# Patient Record
Sex: FEMALE | Race: BLACK OR AFRICAN AMERICAN | NOT HISPANIC OR LATINO | Employment: OTHER | RURAL
[De-identification: names, ages, dates, MRNs, and addresses within clinical notes are randomized per-mention and may not be internally consistent; named-entity substitution may affect disease eponyms.]

---

## 2017-07-03 ENCOUNTER — HISTORICAL (OUTPATIENT)
Dept: ADMINISTRATIVE | Facility: HOSPITAL | Age: 42
End: 2017-07-03

## 2017-07-06 LAB
LAB AP CLINICAL INFORMATION: NORMAL
LAB AP GENERAL CAT - HISTORICAL: NORMAL
LAB AP INTERPRETATION/RESULT - HISTORICAL: NEGATIVE
LAB AP SPECIMEN ADEQUACY - HISTORICAL: NORMAL
LAB AP SPECIMEN SUBMITTED - HISTORICAL: NORMAL

## 2020-08-13 ENCOUNTER — HISTORICAL (OUTPATIENT)
Dept: ADMINISTRATIVE | Facility: HOSPITAL | Age: 45
End: 2020-08-13

## 2020-08-13 LAB
ANION GAP SERPL CALCULATED.3IONS-SCNC: 16 MMOL/L
BUN SERPL-MCNC: 14 MG/DL (ref 7–18)
CALCIUM SERPL-MCNC: 9.3 MG/DL (ref 8.5–10.1)
CHLORIDE SERPL-SCNC: 98 MMOL/L (ref 98–107)
CO2 SERPL-SCNC: 22 MMOL/L (ref 21–32)
CREAT SERPL-MCNC: 0.94 MG/DL (ref 0.55–1.02)
GLUCOSE SERPL-MCNC: 260 MG/DL (ref 70–105)
GLUCOSE SERPL-MCNC: 318 MG/DL (ref 70–105)
GLUCOSE SERPL-MCNC: 335 MG/DL (ref 60–95)
GLUCOSE SERPL-MCNC: 342 MG/DL (ref 74–106)
LDH SERPL L TO P-CCNC: 1.5 MMOL/L (ref 0.3–1.2)
POC BASE EXCESS: 2.4 MMOL/L (ref -2–3)
POC HCO3 VENOUS: 27 MMOL/L (ref 24–28)
POC HCT: 49 % (ref 35–51)
POC IONIZED CALCIUM: 1.12 MMOL/L (ref 1.15–1.35)
POC PCO2 VENOUS: 42 MM HG (ref 41–51)
POC PH VENOUS: 7.42 PH UNITS (ref 7.32–7.42)
POC PO2 VENOUS: 38 MM HG (ref 25–40)
POC SATURATED O2 VENOUS: 73 % (ref 40–70)
POTASSIUM SERPL-SCNC: 4.4 MMOL/L (ref 3.5–5.1)
POTASSIUM SERPL-SCNC: 5 MMOL/L (ref 3.4–4.5)
SODIUM SERPL-SCNC: 132 MMOL/L (ref 136–145)
SODIUM SERPL-SCNC: 132 MMOL/L (ref 136–145)

## 2020-09-03 ENCOUNTER — HISTORICAL (OUTPATIENT)
Dept: ADMINISTRATIVE | Facility: HOSPITAL | Age: 45
End: 2020-09-03

## 2020-09-03 LAB
ANION GAP SERPL CALCULATED.3IONS-SCNC: 9 MMOL/L
BASOPHILS # BLD AUTO: 0.01 X10E3/UL (ref 0–0.2)
BASOPHILS NFR BLD AUTO: 0.2 % (ref 0–1)
BUN SERPL-MCNC: 11 MG/DL (ref 7–17)
CALCIUM SERPL-MCNC: 9.4 MG/DL (ref 8.5–10.1)
CHLORIDE SERPL-SCNC: 104 MMOL/L (ref 98–107)
CO2 SERPL-SCNC: 27 MMOL/L (ref 21–32)
CREAT SERPL-MCNC: 1.11 MG/DL (ref 0.55–1.3)
EOSINOPHIL # BLD AUTO: 0.05 X10E3/UL (ref 0–0.5)
EOSINOPHIL NFR BLD AUTO: 0.9 % (ref 1–4)
ERYTHROCYTE [DISTWIDTH] IN BLOOD BY AUTOMATED COUNT: 12.7 % (ref 11.5–14.5)
GLUCOSE SERPL-MCNC: 336 MG/DL (ref 74–106)
HCT VFR BLD AUTO: 37.3 % (ref 38–47)
HGB BLD-MCNC: 12.9 G/DL (ref 12–16)
LYMPHOCYTES # BLD AUTO: 1.8 X10E3/UL (ref 1–4.8)
LYMPHOCYTES NFR BLD AUTO: 32.6 % (ref 27–41)
MCH RBC QN AUTO: 31 PG (ref 27–31)
MCHC RBC AUTO-ENTMCNC: 34.6 G/DL (ref 32–36)
MCV RBC AUTO: 90 FL (ref 80–96)
MONOCYTES # BLD AUTO: 0.43 X10E3/UL (ref 0–0.8)
MONOCYTES NFR BLD AUTO: 7.8 % (ref 2–6)
MPC BLD CALC-MCNC: 11 FL (ref 9.4–12.4)
NEUTROPHILS # BLD AUTO: 3.23 X10E3/UL (ref 1.8–7.7)
NEUTROPHILS NFR BLD AUTO: 58.5 % (ref 53–65)
PLATELET # BLD AUTO: 218 X10E3/UL (ref 150–400)
POTASSIUM SERPL-SCNC: 3.7 MMOL/L (ref 3.5–5.1)
RBC # BLD AUTO: 4.16 X10E6/UL (ref 4.2–5.4)
SODIUM SERPL-SCNC: 136 MMOL/L (ref 136–145)
WBC # BLD AUTO: 5.52 X10E3/UL (ref 4.5–11)

## 2020-09-14 ENCOUNTER — HISTORICAL (OUTPATIENT)
Dept: ADMINISTRATIVE | Facility: HOSPITAL | Age: 45
End: 2020-09-14

## 2020-09-15 LAB
LAB AP CLINICAL INFORMATION: NORMAL
LAB AP GENERAL CAT - HISTORICAL: NORMAL
LAB AP INTERPRETATION/RESULT - HISTORICAL: NEGATIVE
LAB AP SPECIMEN ADEQUACY - HISTORICAL: NORMAL
LAB AP SPECIMEN SUBMITTED - HISTORICAL: NORMAL
RPR SER-TITR: NON REACTIVE {TITER}

## 2020-09-21 ENCOUNTER — HISTORICAL (OUTPATIENT)
Dept: ADMINISTRATIVE | Facility: HOSPITAL | Age: 45
End: 2020-09-21

## 2020-11-12 ENCOUNTER — HISTORICAL (OUTPATIENT)
Dept: ADMINISTRATIVE | Facility: HOSPITAL | Age: 45
End: 2020-11-12

## 2020-11-13 LAB — RPR SER-TITR: NON REACTIVE {TITER}

## 2020-12-01 LAB
LEFT EYE DM RETINOPATHY: NEGATIVE
RIGHT EYE DM RETINOPATHY: NEGATIVE

## 2021-04-14 ENCOUNTER — HISTORICAL (OUTPATIENT)
Dept: ADMINISTRATIVE | Facility: HOSPITAL | Age: 46
End: 2021-04-14

## 2021-04-15 LAB — RPR SER-TITR: NON REACTIVE {TITER}

## 2021-05-10 VITALS
SYSTOLIC BLOOD PRESSURE: 105 MMHG | WEIGHT: 215 LBS | HEIGHT: 65 IN | HEART RATE: 69 BPM | RESPIRATION RATE: 13 BRPM | TEMPERATURE: 97 F | BODY MASS INDEX: 35.82 KG/M2 | DIASTOLIC BLOOD PRESSURE: 74 MMHG

## 2021-05-10 RX ORDER — TOPIRAMATE 25 MG/1
25 TABLET ORAL 2 TIMES DAILY
COMMUNITY
End: 2021-05-17 | Stop reason: DRUGHIGH

## 2021-05-10 RX ORDER — METFORMIN HYDROCHLORIDE 500 MG/1
500 TABLET ORAL 2 TIMES DAILY WITH MEALS
COMMUNITY
End: 2021-09-21 | Stop reason: SDUPTHER

## 2021-05-10 RX ORDER — LINAGLIPTIN 5 MG/1
5 TABLET, FILM COATED ORAL DAILY
COMMUNITY
End: 2021-09-21 | Stop reason: SDUPTHER

## 2021-05-10 RX ORDER — LANCETS 32 GAUGE
100 EACH MISCELLANEOUS
COMMUNITY

## 2021-05-10 RX ORDER — PYRIDOSTIGMINE BROMIDE 60 MG/1
60 TABLET ORAL 3 TIMES DAILY
COMMUNITY
End: 2024-01-24 | Stop reason: ALTCHOICE

## 2021-05-10 RX ORDER — OMEPRAZOLE 20 MG/1
20 CAPSULE, DELAYED RELEASE ORAL DAILY
COMMUNITY
End: 2021-09-21 | Stop reason: SDUPTHER

## 2021-05-10 RX ORDER — ALBUTEROL SULFATE 90 UG/1
2 AEROSOL, METERED RESPIRATORY (INHALATION) EVERY 6 HOURS PRN
COMMUNITY
End: 2022-04-13 | Stop reason: SDUPTHER

## 2021-05-17 ENCOUNTER — OFFICE VISIT (OUTPATIENT)
Dept: FAMILY MEDICINE | Facility: CLINIC | Age: 46
End: 2021-05-17
Payer: MEDICARE

## 2021-05-17 VITALS
RESPIRATION RATE: 12 BRPM | HEIGHT: 65 IN | TEMPERATURE: 97 F | HEART RATE: 77 BPM | DIASTOLIC BLOOD PRESSURE: 76 MMHG | BODY MASS INDEX: 35.65 KG/M2 | SYSTOLIC BLOOD PRESSURE: 107 MMHG | WEIGHT: 214 LBS

## 2021-05-17 DIAGNOSIS — G43.009 MIGRAINE WITHOUT AURA AND WITHOUT STATUS MIGRAINOSUS, NOT INTRACTABLE: ICD-10-CM

## 2021-05-17 DIAGNOSIS — E11.9 TYPE 2 DIABETES MELLITUS WITHOUT COMPLICATION, WITHOUT LONG-TERM CURRENT USE OF INSULIN: Primary | ICD-10-CM

## 2021-05-17 PROCEDURE — 99213 PR OFFICE/OUTPT VISIT, EST, LEVL III, 20-29 MIN: ICD-10-PCS | Mod: ,,, | Performed by: NURSE PRACTITIONER

## 2021-05-17 PROCEDURE — 99213 OFFICE O/P EST LOW 20 MIN: CPT | Mod: ,,, | Performed by: NURSE PRACTITIONER

## 2021-05-17 RX ORDER — TOPIRAMATE 50 MG/1
50 TABLET, FILM COATED ORAL 2 TIMES DAILY
Qty: 60 TABLET | Refills: 0 | Status: SHIPPED | OUTPATIENT
Start: 2021-05-17 | End: 2021-06-21 | Stop reason: SDUPTHER

## 2021-06-10 ENCOUNTER — TELEPHONE (OUTPATIENT)
Dept: FAMILY MEDICINE | Facility: CLINIC | Age: 46
End: 2021-06-10

## 2021-06-10 ENCOUNTER — HOSPITAL ENCOUNTER (OUTPATIENT)
Dept: RADIOLOGY | Facility: HOSPITAL | Age: 46
Discharge: HOME | End: 2021-06-10
Attending: FAMILY MEDICINE
Payer: MEDICARE

## 2021-06-10 VITALS — BODY MASS INDEX: 33.59 KG/M2 | HEIGHT: 66 IN | WEIGHT: 209 LBS

## 2021-06-10 DIAGNOSIS — Z12.31 OTHER SCREENING MAMMOGRAM: ICD-10-CM

## 2021-06-10 PROCEDURE — 77067 SCR MAMMO BI INCL CAD: CPT | Mod: TC

## 2021-06-11 ENCOUNTER — TELEPHONE (OUTPATIENT)
Dept: FAMILY MEDICINE | Facility: CLINIC | Age: 46
End: 2021-06-11

## 2021-06-21 ENCOUNTER — OFFICE VISIT (OUTPATIENT)
Dept: FAMILY MEDICINE | Facility: CLINIC | Age: 46
End: 2021-06-21
Payer: MEDICARE

## 2021-06-21 VITALS
SYSTOLIC BLOOD PRESSURE: 118 MMHG | HEIGHT: 65 IN | WEIGHT: 213 LBS | DIASTOLIC BLOOD PRESSURE: 81 MMHG | RESPIRATION RATE: 14 BRPM | HEART RATE: 92 BPM | BODY MASS INDEX: 35.49 KG/M2 | TEMPERATURE: 98 F

## 2021-06-21 DIAGNOSIS — Z13.220 SCREENING FOR LIPOID DISORDERS: ICD-10-CM

## 2021-06-21 DIAGNOSIS — G43.009 MIGRAINE WITHOUT AURA AND WITHOUT STATUS MIGRAINOSUS, NOT INTRACTABLE: ICD-10-CM

## 2021-06-21 DIAGNOSIS — E11.9 TYPE 2 DIABETES MELLITUS WITHOUT COMPLICATION, WITHOUT LONG-TERM CURRENT USE OF INSULIN: Primary | ICD-10-CM

## 2021-06-21 PROBLEM — M62.81 PROXIMAL MUSCLE WEAKNESS: Status: RESOLVED | Noted: 2018-04-06 | Resolved: 2021-06-21

## 2021-06-21 PROBLEM — M62.81 PROXIMAL MUSCLE WEAKNESS: Status: ACTIVE | Noted: 2018-04-06

## 2021-06-21 PROBLEM — A52.8 LATE SYPHILIS, LATENT: Status: ACTIVE | Noted: 2021-06-21

## 2021-06-21 PROBLEM — H02.409 PTOSIS: Status: ACTIVE | Noted: 2021-06-21

## 2021-06-21 PROBLEM — E55.9 VITAMIN D DEFICIENCY: Status: ACTIVE | Noted: 2018-07-09

## 2021-06-21 PROCEDURE — 99214 OFFICE O/P EST MOD 30 MIN: CPT | Mod: ,,, | Performed by: NURSE PRACTITIONER

## 2021-06-21 PROCEDURE — 99214 PR OFFICE/OUTPT VISIT, EST, LEVL IV, 30-39 MIN: ICD-10-PCS | Mod: ,,, | Performed by: NURSE PRACTITIONER

## 2021-06-21 RX ORDER — TOPIRAMATE 50 MG/1
50 TABLET, FILM COATED ORAL 2 TIMES DAILY
Qty: 60 TABLET | Refills: 2 | Status: SHIPPED | OUTPATIENT
Start: 2021-06-21 | End: 2021-09-21 | Stop reason: SDUPTHER

## 2021-06-21 RX ORDER — SEMAGLUTIDE 1.34 MG/ML
1 INJECTION, SOLUTION SUBCUTANEOUS
Qty: 2 PEN | Refills: 2 | Status: SHIPPED | OUTPATIENT
Start: 2021-06-21 | End: 2021-07-21

## 2021-06-21 RX ORDER — PREDNISONE 20 MG/1
20 TABLET ORAL 2 TIMES DAILY
COMMUNITY
Start: 2021-02-25 | End: 2023-02-27 | Stop reason: ALTCHOICE

## 2021-08-13 ENCOUNTER — OFFICE VISIT (OUTPATIENT)
Dept: NEUROLOGY | Facility: CLINIC | Age: 46
End: 2021-08-13
Payer: MEDICARE

## 2021-08-13 VITALS
SYSTOLIC BLOOD PRESSURE: 114 MMHG | WEIGHT: 203 LBS | HEIGHT: 65 IN | HEART RATE: 81 BPM | BODY MASS INDEX: 33.82 KG/M2 | DIASTOLIC BLOOD PRESSURE: 76 MMHG | OXYGEN SATURATION: 97 %

## 2021-08-13 DIAGNOSIS — H02.401 PTOSIS OF EYELID, RIGHT: ICD-10-CM

## 2021-08-13 DIAGNOSIS — R53.1 WEAKNESS OF RIGHT SIDE OF BODY: Primary | ICD-10-CM

## 2021-08-13 DIAGNOSIS — E03.9 HYPOTHYROIDISM, UNSPECIFIED TYPE: ICD-10-CM

## 2021-08-13 DIAGNOSIS — G73.7 MYOPATHY IN DISEASES CLASSIFIED ELSEWHERE: ICD-10-CM

## 2021-08-13 DIAGNOSIS — E53.8 VITAMIN B12 DEFICIENCY: ICD-10-CM

## 2021-08-13 DIAGNOSIS — E11.69 TYPE 2 DIABETES MELLITUS WITH OTHER SPECIFIED COMPLICATION, WITHOUT LONG-TERM CURRENT USE OF INSULIN: ICD-10-CM

## 2021-08-13 DIAGNOSIS — E55.9 VITAMIN D DEFICIENCY: ICD-10-CM

## 2021-08-13 DIAGNOSIS — M54.12 RADICULOPATHY, CERVICAL REGION: ICD-10-CM

## 2021-08-13 DIAGNOSIS — R27.0 ATAXIA, UNSPECIFIED: ICD-10-CM

## 2021-08-13 PROCEDURE — 99204 PR OFFICE/OUTPT VISIT, NEW, LEVL IV, 45-59 MIN: ICD-10-PCS | Mod: S$PBB,,, | Performed by: NURSE PRACTITIONER

## 2021-08-13 PROCEDURE — 99204 OFFICE O/P NEW MOD 45 MIN: CPT | Mod: S$PBB,,, | Performed by: NURSE PRACTITIONER

## 2021-08-13 PROCEDURE — 99215 OFFICE O/P EST HI 40 MIN: CPT | Mod: PBBFAC | Performed by: NURSE PRACTITIONER

## 2021-08-13 RX ORDER — SEMAGLUTIDE 1.34 MG/ML
INJECTION, SOLUTION SUBCUTANEOUS
COMMUNITY
Start: 2021-07-19 | End: 2021-09-21 | Stop reason: SDUPTHER

## 2021-08-20 ENCOUNTER — HOSPITAL ENCOUNTER (OUTPATIENT)
Dept: RADIOLOGY | Facility: HOSPITAL | Age: 46
Discharge: HOME | End: 2021-08-20
Attending: NURSE PRACTITIONER
Payer: MEDICARE

## 2021-08-20 DIAGNOSIS — M54.12 RADICULOPATHY, CERVICAL REGION: ICD-10-CM

## 2021-08-20 DIAGNOSIS — R27.0 ATAXIA, UNSPECIFIED: ICD-10-CM

## 2021-08-20 PROCEDURE — 70553 MRI BRAIN W WO CONTRAST: ICD-10-PCS | Mod: 26,,, | Performed by: STUDENT IN AN ORGANIZED HEALTH CARE EDUCATION/TRAINING PROGRAM

## 2021-08-20 PROCEDURE — 72156 MRI CERVICAL SPINE W WO CONTRAST: ICD-10-PCS | Mod: 26,,, | Performed by: STUDENT IN AN ORGANIZED HEALTH CARE EDUCATION/TRAINING PROGRAM

## 2021-08-20 PROCEDURE — 70553 MRI BRAIN STEM W/O & W/DYE: CPT | Mod: 26,,, | Performed by: STUDENT IN AN ORGANIZED HEALTH CARE EDUCATION/TRAINING PROGRAM

## 2021-08-20 PROCEDURE — 72156 MRI NECK SPINE W/O & W/DYE: CPT | Mod: TC

## 2021-08-20 PROCEDURE — 25500020 PHARM REV CODE 255: Performed by: NURSE PRACTITIONER

## 2021-08-20 PROCEDURE — A9575 INJ GADOTERATE MEGLUMI 0.1ML: HCPCS | Performed by: NURSE PRACTITIONER

## 2021-08-20 PROCEDURE — 70553 MRI BRAIN STEM W/O & W/DYE: CPT | Mod: TC

## 2021-08-20 PROCEDURE — 72156 MRI NECK SPINE W/O & W/DYE: CPT | Mod: 26,,, | Performed by: STUDENT IN AN ORGANIZED HEALTH CARE EDUCATION/TRAINING PROGRAM

## 2021-08-20 RX ORDER — GADOTERATE MEGLUMINE 376.9 MG/ML
18 INJECTION INTRAVENOUS
Status: COMPLETED | OUTPATIENT
Start: 2021-08-20 | End: 2021-08-20

## 2021-08-20 RX ADMIN — GADOTERATE MEGLUMINE 18 ML: 376.9 INJECTION INTRAVENOUS at 11:08

## 2021-08-23 ENCOUNTER — TELEPHONE (OUTPATIENT)
Dept: NEUROLOGY | Facility: CLINIC | Age: 46
End: 2021-08-23

## 2021-09-21 ENCOUNTER — OFFICE VISIT (OUTPATIENT)
Dept: FAMILY MEDICINE | Facility: CLINIC | Age: 46
End: 2021-09-21
Payer: MEDICARE

## 2021-09-21 VITALS
HEART RATE: 100 BPM | SYSTOLIC BLOOD PRESSURE: 113 MMHG | HEIGHT: 65 IN | WEIGHT: 198 LBS | DIASTOLIC BLOOD PRESSURE: 79 MMHG | OXYGEN SATURATION: 99 % | BODY MASS INDEX: 32.99 KG/M2

## 2021-09-21 DIAGNOSIS — E11.9 TYPE 2 DIABETES MELLITUS WITHOUT COMPLICATION, WITHOUT LONG-TERM CURRENT USE OF INSULIN: Primary | ICD-10-CM

## 2021-09-21 DIAGNOSIS — H02.401 PTOSIS OF EYELID, RIGHT: ICD-10-CM

## 2021-09-21 DIAGNOSIS — G72.9 MYOPATHY: ICD-10-CM

## 2021-09-21 DIAGNOSIS — G43.009 MIGRAINE WITHOUT AURA AND WITHOUT STATUS MIGRAINOSUS, NOT INTRACTABLE: ICD-10-CM

## 2021-09-21 LAB — GLUCOSE SERPL-MCNC: 86 MG/DL (ref 70–110)

## 2021-09-21 PROCEDURE — 82962 GLUCOSE BLOOD TEST: CPT | Mod: RHCUB | Performed by: NURSE PRACTITIONER

## 2021-09-21 PROCEDURE — 99213 OFFICE O/P EST LOW 20 MIN: CPT | Mod: ,,, | Performed by: NURSE PRACTITIONER

## 2021-09-21 PROCEDURE — 99213 PR OFFICE/OUTPT VISIT, EST, LEVL III, 20-29 MIN: ICD-10-PCS | Mod: ,,, | Performed by: NURSE PRACTITIONER

## 2021-09-21 RX ORDER — METFORMIN HYDROCHLORIDE 500 MG/1
500 TABLET ORAL 2 TIMES DAILY WITH MEALS
Qty: 180 TABLET | Refills: 0 | Status: SHIPPED | OUTPATIENT
Start: 2021-09-21 | End: 2021-12-28 | Stop reason: SDUPTHER

## 2021-09-21 RX ORDER — TOPIRAMATE 50 MG/1
50 TABLET, FILM COATED ORAL 2 TIMES DAILY
Qty: 180 TABLET | Refills: 0 | Status: SHIPPED | OUTPATIENT
Start: 2021-09-21 | End: 2021-12-28 | Stop reason: SDUPTHER

## 2021-09-21 RX ORDER — OMEPRAZOLE 20 MG/1
20 CAPSULE, DELAYED RELEASE ORAL DAILY
Qty: 90 CAPSULE | Refills: 0 | Status: SHIPPED | OUTPATIENT
Start: 2021-09-21 | End: 2021-12-28 | Stop reason: SDUPTHER

## 2021-09-21 RX ORDER — SEMAGLUTIDE 1.34 MG/ML
INJECTION, SOLUTION SUBCUTANEOUS
Qty: 3 PEN | Refills: 0 | Status: SHIPPED | OUTPATIENT
Start: 2021-09-21 | End: 2021-11-01 | Stop reason: SDUPTHER

## 2021-09-21 RX ORDER — LINAGLIPTIN 5 MG/1
5 TABLET, FILM COATED ORAL DAILY
Qty: 90 TABLET | Refills: 0 | Status: SHIPPED | OUTPATIENT
Start: 2021-09-21 | End: 2021-12-28 | Stop reason: SDUPTHER

## 2021-10-13 ENCOUNTER — OFFICE VISIT (OUTPATIENT)
Dept: OBSTETRICS AND GYNECOLOGY | Facility: CLINIC | Age: 46
End: 2021-10-13
Payer: MEDICARE

## 2021-10-13 VITALS
TEMPERATURE: 99 F | HEART RATE: 74 BPM | BODY MASS INDEX: 32.82 KG/M2 | DIASTOLIC BLOOD PRESSURE: 76 MMHG | RESPIRATION RATE: 18 BRPM | HEIGHT: 65 IN | SYSTOLIC BLOOD PRESSURE: 107 MMHG | WEIGHT: 197 LBS

## 2021-10-13 DIAGNOSIS — Z01.419 ROUTINE GYNECOLOGICAL EXAMINATION: Primary | ICD-10-CM

## 2021-10-13 DIAGNOSIS — N89.8 VAGINAL DISCHARGE: ICD-10-CM

## 2021-10-13 LAB
CANDIDA SPECIES: NEGATIVE
GARDNERELLA: POSITIVE
TRICHOMONAS: NEGATIVE

## 2021-10-13 PROCEDURE — 87660 TRICHOMONAS VAGIN DIR PROBE: CPT | Mod: ,,, | Performed by: CLINICAL MEDICAL LABORATORY

## 2021-10-13 PROCEDURE — 87660 BACTERIAL VAGINOSIS: ICD-10-PCS | Mod: ,,, | Performed by: CLINICAL MEDICAL LABORATORY

## 2021-10-13 PROCEDURE — 87510 BACTERIAL VAGINOSIS: ICD-10-PCS | Mod: ,,, | Performed by: CLINICAL MEDICAL LABORATORY

## 2021-10-13 PROCEDURE — G0008 ADMIN INFLUENZA VIRUS VAC: HCPCS | Mod: ,,, | Performed by: ADVANCED PRACTICE MIDWIFE

## 2021-10-13 PROCEDURE — 90686 IIV4 VACC NO PRSV 0.5 ML IM: CPT | Mod: ,,, | Performed by: ADVANCED PRACTICE MIDWIFE

## 2021-10-13 PROCEDURE — G0008 FLU VACCINE (QUAD) GREATER THAN OR EQUAL TO 3YO PRESERVATIVE FREE IM: ICD-10-PCS | Mod: ,,, | Performed by: ADVANCED PRACTICE MIDWIFE

## 2021-10-13 PROCEDURE — 90686 FLU VACCINE (QUAD) GREATER THAN OR EQUAL TO 3YO PRESERVATIVE FREE IM: ICD-10-PCS | Mod: ,,, | Performed by: ADVANCED PRACTICE MIDWIFE

## 2021-10-13 PROCEDURE — G0101 CA SCREEN;PELVIC/BREAST EXAM: HCPCS | Mod: ,,, | Performed by: ADVANCED PRACTICE MIDWIFE

## 2021-10-13 PROCEDURE — G0101 PR CA SCREEN;PELVIC/BREAST EXAM: ICD-10-PCS | Mod: ,,, | Performed by: ADVANCED PRACTICE MIDWIFE

## 2021-10-13 PROCEDURE — 87510 GARDNER VAG DNA DIR PROBE: CPT | Mod: ,,, | Performed by: CLINICAL MEDICAL LABORATORY

## 2021-10-13 PROCEDURE — 87480 CANDIDA DNA DIR PROBE: CPT | Mod: ,,, | Performed by: CLINICAL MEDICAL LABORATORY

## 2021-10-13 PROCEDURE — 87480 BACTERIAL VAGINOSIS: ICD-10-PCS | Mod: ,,, | Performed by: CLINICAL MEDICAL LABORATORY

## 2021-10-14 DIAGNOSIS — N76.0 BACTERIAL VAGINOSIS: Primary | ICD-10-CM

## 2021-10-14 DIAGNOSIS — B96.89 BACTERIAL VAGINOSIS: Primary | ICD-10-CM

## 2021-10-14 RX ORDER — METRONIDAZOLE 500 MG/1
500 TABLET ORAL 2 TIMES DAILY
Qty: 14 TABLET | Refills: 0 | Status: SHIPPED | OUTPATIENT
Start: 2021-10-14 | End: 2021-10-21

## 2021-10-15 ENCOUNTER — TELEPHONE (OUTPATIENT)
Dept: OBSTETRICS AND GYNECOLOGY | Facility: CLINIC | Age: 46
End: 2021-10-15

## 2021-10-26 ENCOUNTER — OFFICE VISIT (OUTPATIENT)
Dept: NEUROLOGY | Facility: CLINIC | Age: 46
End: 2021-10-26
Payer: MEDICARE

## 2021-10-26 VITALS
HEIGHT: 65 IN | HEART RATE: 87 BPM | SYSTOLIC BLOOD PRESSURE: 108 MMHG | BODY MASS INDEX: 32.82 KG/M2 | DIASTOLIC BLOOD PRESSURE: 72 MMHG | OXYGEN SATURATION: 99 % | RESPIRATION RATE: 18 BRPM | WEIGHT: 197 LBS

## 2021-10-26 DIAGNOSIS — G73.7 MYOPATHY IN DISEASES CLASSIFIED ELSEWHERE: ICD-10-CM

## 2021-10-26 DIAGNOSIS — G72.9 MYOPATHY: ICD-10-CM
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
## 2021-10-26 DIAGNOSIS — H02.401 PTOSIS OF EYELID, RIGHT: ICD-10-CM

## 2021-10-26 DIAGNOSIS — M54.12 RADICULOPATHY, CERVICAL REGION: ICD-10-CM

## 2021-10-26 DIAGNOSIS — R53.1 WEAKNESS OF RIGHT SIDE OF BODY: Primary | ICD-10-CM

## 2021-10-26 PROCEDURE — 99213 PR OFFICE/OUTPT VISIT, EST, LEVL III, 20-29 MIN: ICD-10-PCS | Mod: S$PBB,,, | Performed by: NURSE PRACTITIONER

## 2021-10-26 PROCEDURE — 99215 OFFICE O/P EST HI 40 MIN: CPT | Mod: PBBFAC | Performed by: NURSE PRACTITIONER

## 2021-10-26 PROCEDURE — 99213 OFFICE O/P EST LOW 20 MIN: CPT | Mod: S$PBB,,, | Performed by: NURSE PRACTITIONER

## 2021-10-26 RX ORDER — GABAPENTIN 100 MG/1
100 CAPSULE ORAL 3 TIMES DAILY
Qty: 90 CAPSULE | Refills: 11 | Status: SHIPPED | OUTPATIENT
Start: 2021-10-26 | End: 2022-12-28 | Stop reason: ALTCHOICE

## 2021-10-26 RX ORDER — GABAPENTIN 100 MG/1
100 CAPSULE ORAL 3 TIMES DAILY
Qty: 90 CAPSULE | Refills: 11 | Status: SHIPPED | OUTPATIENT
Start: 2021-10-26 | End: 2021-10-26

## 2021-10-26 RX ORDER — METRONIDAZOLE 500 MG/1
500 TABLET ORAL EVERY 12 HOURS
COMMUNITY
End: 2023-02-27 | Stop reason: ALTCHOICE

## 2021-11-01 DIAGNOSIS — E11.9 TYPE 2 DIABETES MELLITUS WITHOUT COMPLICATION, WITHOUT LONG-TERM CURRENT USE OF INSULIN: Primary | ICD-10-CM

## 2021-11-01 RX ORDER — SEMAGLUTIDE 1.34 MG/ML
INJECTION, SOLUTION SUBCUTANEOUS
Qty: 3 PEN | Refills: 0 | Status: SHIPPED | OUTPATIENT
Start: 2021-11-01 | End: 2021-12-28

## 2021-11-09 ENCOUNTER — CLINICAL SUPPORT (OUTPATIENT)
Dept: REHABILITATION | Facility: HOSPITAL | Age: 46
End: 2021-11-09
Payer: MEDICARE

## 2021-11-09 DIAGNOSIS — R53.1 WEAKNESS OF RIGHT SIDE OF BODY: ICD-10-CM
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
## 2021-11-09 PROCEDURE — 97162 PT EVAL MOD COMPLEX 30 MIN: CPT

## 2021-11-11 ENCOUNTER — CLINICAL SUPPORT (OUTPATIENT)
Dept: REHABILITATION | Facility: HOSPITAL | Age: 46
End: 2021-11-11
Payer: MEDICARE

## 2021-11-11 DIAGNOSIS — R53.1 WEAKNESS OF RIGHT SIDE OF BODY: Primary | ICD-10-CM

## 2021-11-11 PROCEDURE — 97110 THERAPEUTIC EXERCISES: CPT | Mod: CQ

## 2021-11-16 ENCOUNTER — OFFICE VISIT (OUTPATIENT)
Dept: NEUROLOGY | Facility: CLINIC | Age: 46
End: 2021-11-16
Payer: MEDICARE

## 2021-11-16 VITALS
BODY MASS INDEX: 32.82 KG/M2 | WEIGHT: 197 LBS | RESPIRATION RATE: 18 BRPM | SYSTOLIC BLOOD PRESSURE: 128 MMHG | OXYGEN SATURATION: 99 % | HEIGHT: 65 IN | HEART RATE: 82 BPM | DIASTOLIC BLOOD PRESSURE: 82 MMHG

## 2021-11-16 DIAGNOSIS — G73.7 MYOPATHY IN DISEASES CLASSIFIED ELSEWHERE: Primary | ICD-10-CM

## 2021-11-16 DIAGNOSIS — G43.009 MIGRAINE WITHOUT AURA AND WITHOUT STATUS MIGRAINOSUS, NOT INTRACTABLE: ICD-10-CM

## 2021-11-16 DIAGNOSIS — R53.1 WEAKNESS OF RIGHT SIDE OF BODY: ICD-10-CM

## 2021-11-16 DIAGNOSIS — H02.401 PTOSIS OF EYELID, RIGHT: ICD-10-CM

## 2021-11-16 DIAGNOSIS — G72.9 MYOPATHY: ICD-10-CM

## 2021-11-16 PROCEDURE — 99213 OFFICE O/P EST LOW 20 MIN: CPT | Mod: S$PBB,,, | Performed by: NURSE PRACTITIONER

## 2021-11-16 PROCEDURE — 99213 PR OFFICE/OUTPT VISIT, EST, LEVL III, 20-29 MIN: ICD-10-PCS | Mod: S$PBB,,, | Performed by: NURSE PRACTITIONER

## 2021-11-16 PROCEDURE — 99214 OFFICE O/P EST MOD 30 MIN: CPT | Mod: PBBFAC | Performed by: NURSE PRACTITIONER

## 2021-12-28 ENCOUNTER — OFFICE VISIT (OUTPATIENT)
Dept: FAMILY MEDICINE | Facility: CLINIC | Age: 46
End: 2021-12-28
Payer: MEDICARE

## 2021-12-28 VITALS
WEIGHT: 208 LBS | TEMPERATURE: 97 F | DIASTOLIC BLOOD PRESSURE: 74 MMHG | OXYGEN SATURATION: 99 % | BODY MASS INDEX: 34.66 KG/M2 | SYSTOLIC BLOOD PRESSURE: 105 MMHG | HEIGHT: 65 IN | HEART RATE: 73 BPM

## 2021-12-28 DIAGNOSIS — G43.009 MIGRAINE WITHOUT AURA AND WITHOUT STATUS MIGRAINOSUS, NOT INTRACTABLE: ICD-10-CM

## 2021-12-28 DIAGNOSIS — R30.0 DYSURIA: ICD-10-CM

## 2021-12-28 DIAGNOSIS — H02.401 PTOSIS OF EYELID, RIGHT: ICD-10-CM

## 2021-12-28 DIAGNOSIS — E78.5 HYPERLIPIDEMIA, UNSPECIFIED HYPERLIPIDEMIA TYPE: ICD-10-CM

## 2021-12-28 DIAGNOSIS — R53.1 WEAKNESS OF RIGHT SIDE OF BODY: ICD-10-CM

## 2021-12-28 DIAGNOSIS — G73.7 MYOPATHY IN DISEASES CLASSIFIED ELSEWHERE: ICD-10-CM

## 2021-12-28 DIAGNOSIS — K21.9 GASTROESOPHAGEAL REFLUX DISEASE, UNSPECIFIED WHETHER ESOPHAGITIS PRESENT: ICD-10-CM

## 2021-12-28 DIAGNOSIS — E11.9 TYPE 2 DIABETES MELLITUS WITHOUT COMPLICATION, WITHOUT LONG-TERM CURRENT USE OF INSULIN: Primary | ICD-10-CM

## 2021-12-28 LAB
BILIRUB SERPL-MCNC: ABNORMAL MG/DL
BLOOD URINE, POC: ABNORMAL
COLOR, POC UA: YELLOW
EST. AVERAGE GLUCOSE BLD GHB EST-MCNC: 114 MG/DL
GLUCOSE SERPL-MCNC: 116 MG/DL (ref 70–110)
GLUCOSE UR QL STRIP: ABNORMAL
HBA1C MFR BLD HPLC: 6 % (ref 4.5–6.6)
KETONES UR QL STRIP: ABNORMAL
LEUKOCYTE ESTERASE URINE, POC: ABNORMAL
NITRITE, POC UA: ABNORMAL
PH, POC UA: 5
PROTEIN, POC: ABNORMAL
SPECIFIC GRAVITY, POC UA: >1.03
UROBILINOGEN, POC UA: 1

## 2021-12-28 PROCEDURE — 99214 OFFICE O/P EST MOD 30 MIN: CPT | Mod: ,,, | Performed by: NURSE PRACTITIONER

## 2021-12-28 PROCEDURE — 83036 HEMOGLOBIN GLYCOSYLATED A1C: CPT | Mod: ,,, | Performed by: CLINICAL MEDICAL LABORATORY

## 2021-12-28 PROCEDURE — 83036 HEMOGLOBIN A1C: ICD-10-PCS | Mod: ,,, | Performed by: CLINICAL MEDICAL LABORATORY

## 2021-12-28 PROCEDURE — 99214 PR OFFICE/OUTPT VISIT, EST, LEVL IV, 30-39 MIN: ICD-10-PCS | Mod: ,,, | Performed by: NURSE PRACTITIONER

## 2021-12-28 PROCEDURE — 82962 GLUCOSE BLOOD TEST: CPT | Mod: RHCUB | Performed by: NURSE PRACTITIONER

## 2021-12-28 PROCEDURE — 81003 URINALYSIS AUTO W/O SCOPE: CPT | Mod: RHCUB | Performed by: NURSE PRACTITIONER

## 2021-12-28 RX ORDER — METFORMIN HYDROCHLORIDE 500 MG/1
500 TABLET ORAL 2 TIMES DAILY WITH MEALS
Qty: 180 TABLET | Refills: 0 | Status: SHIPPED | OUTPATIENT
Start: 2021-12-28 | End: 2022-03-14 | Stop reason: SDUPTHER

## 2021-12-28 RX ORDER — OMEPRAZOLE 20 MG/1
20 CAPSULE, DELAYED RELEASE ORAL DAILY
Qty: 90 CAPSULE | Refills: 0 | Status: SHIPPED | OUTPATIENT
Start: 2021-12-28 | End: 2022-04-13 | Stop reason: SDUPTHER

## 2021-12-28 RX ORDER — LOVASTATIN 20 MG/1
20 TABLET ORAL NIGHTLY
Qty: 90 TABLET | Refills: 0 | Status: SHIPPED | OUTPATIENT
Start: 2021-12-28 | End: 2022-05-03

## 2021-12-28 RX ORDER — SEMAGLUTIDE 1.34 MG/ML
0.5 INJECTION, SOLUTION SUBCUTANEOUS
Qty: 3 PEN | Refills: 0 | Status: SHIPPED | OUTPATIENT
Start: 2021-12-28 | End: 2022-03-28

## 2021-12-28 RX ORDER — TOPIRAMATE 50 MG/1
50 TABLET, FILM COATED ORAL 2 TIMES DAILY
Qty: 180 TABLET | Refills: 0 | Status: SHIPPED | OUTPATIENT
Start: 2021-12-28 | End: 2022-04-13 | Stop reason: SDUPTHER

## 2021-12-28 RX ORDER — LINAGLIPTIN 5 MG/1
5 TABLET, FILM COATED ORAL DAILY
Qty: 90 TABLET | Refills: 0 | Status: SHIPPED | OUTPATIENT
Start: 2021-12-28 | End: 2022-04-13 | Stop reason: SDUPTHER

## 2021-12-29 ENCOUNTER — TELEPHONE (OUTPATIENT)
Dept: FAMILY MEDICINE | Facility: CLINIC | Age: 46
End: 2021-12-29
Payer: MEDICAID

## 2021-12-29 ENCOUNTER — DOCUMENTATION ONLY (OUTPATIENT)
Dept: REHABILITATION | Facility: HOSPITAL | Age: 46
End: 2021-12-29
Payer: MEDICAID

## 2022-03-11 DIAGNOSIS — Z71.89 COMPLEX CARE COORDINATION: ICD-10-CM

## 2022-03-14 DIAGNOSIS — E11.9 TYPE 2 DIABETES MELLITUS WITHOUT COMPLICATION, WITHOUT LONG-TERM CURRENT USE OF INSULIN: ICD-10-CM

## 2022-03-14 NOTE — TELEPHONE ENCOUNTER
----- Message from Fernando Whipple sent at 3/14/2022  4:21 PM CDT -----  Rx refill on metformin to the pharmacy

## 2022-03-15 RX ORDER — METFORMIN HYDROCHLORIDE 500 MG/1
500 TABLET ORAL 2 TIMES DAILY WITH MEALS
Qty: 60 TABLET | Refills: 0 | Status: SHIPPED | OUTPATIENT
Start: 2022-03-15 | End: 2022-04-13 | Stop reason: SDUPTHER

## 2022-04-13 ENCOUNTER — OFFICE VISIT (OUTPATIENT)
Dept: FAMILY MEDICINE | Facility: CLINIC | Age: 47
End: 2022-04-13
Payer: COMMERCIAL

## 2022-04-13 VITALS
OXYGEN SATURATION: 99 % | HEART RATE: 84 BPM | BODY MASS INDEX: 35.82 KG/M2 | SYSTOLIC BLOOD PRESSURE: 110 MMHG | WEIGHT: 215 LBS | DIASTOLIC BLOOD PRESSURE: 73 MMHG | TEMPERATURE: 97 F | HEIGHT: 65 IN

## 2022-04-13 DIAGNOSIS — G89.29 CHRONIC RIGHT SHOULDER PAIN: ICD-10-CM

## 2022-04-13 DIAGNOSIS — J45.909 ASTHMA, UNSPECIFIED ASTHMA SEVERITY, UNSPECIFIED WHETHER COMPLICATED, UNSPECIFIED WHETHER PERSISTENT: ICD-10-CM

## 2022-04-13 DIAGNOSIS — G43.009 MIGRAINE WITHOUT AURA AND WITHOUT STATUS MIGRAINOSUS, NOT INTRACTABLE: ICD-10-CM

## 2022-04-13 DIAGNOSIS — Z11.59 ENCOUNTER FOR HEPATITIS C SCREENING TEST FOR LOW RISK PATIENT: ICD-10-CM

## 2022-04-13 DIAGNOSIS — M25.511 CHRONIC RIGHT SHOULDER PAIN: ICD-10-CM

## 2022-04-13 DIAGNOSIS — E11.9 TYPE 2 DIABETES MELLITUS WITHOUT COMPLICATION, WITHOUT LONG-TERM CURRENT USE OF INSULIN: Primary | ICD-10-CM

## 2022-04-13 DIAGNOSIS — K21.9 GASTROESOPHAGEAL REFLUX DISEASE, UNSPECIFIED WHETHER ESOPHAGITIS PRESENT: ICD-10-CM

## 2022-04-13 DIAGNOSIS — G73.7 MYOPATHY IN DISEASES CLASSIFIED ELSEWHERE: ICD-10-CM

## 2022-04-13 DIAGNOSIS — D22.9 SKIN MOLE: ICD-10-CM

## 2022-04-13 DIAGNOSIS — E78.5 HYPERLIPIDEMIA, UNSPECIFIED HYPERLIPIDEMIA TYPE: ICD-10-CM

## 2022-04-13 DIAGNOSIS — I10 HYPERTENSION, UNSPECIFIED TYPE: ICD-10-CM

## 2022-04-13 LAB
ALBUMIN SERPL BCP-MCNC: 3.6 G/DL (ref 3.5–5)
ALBUMIN/GLOB SERPL: 0.9 {RATIO}
ALP SERPL-CCNC: 83 U/L (ref 39–100)
ALT SERPL W P-5'-P-CCNC: 24 U/L (ref 13–56)
ANION GAP SERPL CALCULATED.3IONS-SCNC: 10 MMOL/L (ref 7–16)
AST SERPL W P-5'-P-CCNC: 13 U/L (ref 15–37)
BASOPHILS # BLD AUTO: 0.02 K/UL (ref 0–0.2)
BASOPHILS NFR BLD AUTO: 0.4 % (ref 0–1)
BILIRUB SERPL-MCNC: 0.4 MG/DL (ref 0–1.2)
BUN SERPL-MCNC: 10 MG/DL (ref 7–18)
BUN/CREAT SERPL: 10 (ref 6–20)
CALCIUM SERPL-MCNC: 9.4 MG/DL (ref 8.5–10.1)
CHLORIDE SERPL-SCNC: 107 MMOL/L (ref 98–107)
CHOLEST SERPL-MCNC: 216 MG/DL (ref 0–200)
CHOLEST/HDLC SERPL: 3.7 {RATIO}
CO2 SERPL-SCNC: 26 MMOL/L (ref 21–32)
CREAT SERPL-MCNC: 1.03 MG/DL (ref 0.55–1.02)
DIFFERENTIAL METHOD BLD: ABNORMAL
EOSINOPHIL # BLD AUTO: 0.08 K/UL (ref 0–0.5)
EOSINOPHIL NFR BLD AUTO: 1.7 % (ref 1–4)
ERYTHROCYTE [DISTWIDTH] IN BLOOD BY AUTOMATED COUNT: 12.8 % (ref 11.5–14.5)
GLOBULIN SER-MCNC: 3.8 G/DL (ref 2–4)
GLUCOSE SERPL-MCNC: 139 MG/DL (ref 74–106)
HCT VFR BLD AUTO: 40.8 % (ref 38–47)
HCV AB SER QL: NORMAL
HDLC SERPL-MCNC: 58 MG/DL (ref 40–60)
HGB BLD-MCNC: 13.1 G/DL (ref 12–16)
IMM GRANULOCYTES # BLD AUTO: 0.01 K/UL (ref 0–0.04)
IMM GRANULOCYTES NFR BLD: 0.2 % (ref 0–0.4)
LDLC SERPL CALC-MCNC: 137 MG/DL
LDLC/HDLC SERPL: 2.4 {RATIO}
LYMPHOCYTES # BLD AUTO: 1.33 K/UL (ref 1–4.8)
LYMPHOCYTES NFR BLD AUTO: 29 % (ref 27–41)
MCH RBC QN AUTO: 30.5 PG (ref 27–31)
MCHC RBC AUTO-ENTMCNC: 32.1 G/DL (ref 32–36)
MCV RBC AUTO: 94.9 FL (ref 80–96)
MONOCYTES # BLD AUTO: 0.5 K/UL (ref 0–0.8)
MONOCYTES NFR BLD AUTO: 10.9 % (ref 2–6)
MPC BLD CALC-MCNC: 11.5 FL (ref 9.4–12.4)
NEUTROPHILS # BLD AUTO: 2.64 K/UL (ref 1.8–7.7)
NEUTROPHILS NFR BLD AUTO: 57.8 % (ref 53–65)
NONHDLC SERPL-MCNC: 158 MG/DL
NRBC # BLD AUTO: 0 X10E3/UL
NRBC, AUTO (.00): 0 %
PLATELET # BLD AUTO: 266 K/UL (ref 150–400)
POTASSIUM SERPL-SCNC: 4.3 MMOL/L (ref 3.5–5.1)
PROT SERPL-MCNC: 7.4 G/DL (ref 6.4–8.2)
RBC # BLD AUTO: 4.3 M/UL (ref 4.2–5.4)
SODIUM SERPL-SCNC: 139 MMOL/L (ref 136–145)
TRIGL SERPL-MCNC: 106 MG/DL (ref 35–150)
VLDLC SERPL-MCNC: 21 MG/DL
WBC # BLD AUTO: 4.58 K/UL (ref 4.5–11)

## 2022-04-13 PROCEDURE — 1159F PR MEDICATION LIST DOCUMENTED IN MEDICAL RECORD: ICD-10-PCS | Mod: ,,, | Performed by: NURSE PRACTITIONER

## 2022-04-13 PROCEDURE — 1160F RVW MEDS BY RX/DR IN RCRD: CPT | Mod: ,,, | Performed by: NURSE PRACTITIONER

## 2022-04-13 PROCEDURE — 80061 LIPID PANEL: CPT | Mod: ,,, | Performed by: CLINICAL MEDICAL LABORATORY

## 2022-04-13 PROCEDURE — 3008F BODY MASS INDEX DOCD: CPT | Mod: ,,, | Performed by: NURSE PRACTITIONER

## 2022-04-13 PROCEDURE — 82570 ASSAY OF URINE CREATININE: CPT | Mod: ,,, | Performed by: CLINICAL MEDICAL LABORATORY

## 2022-04-13 PROCEDURE — 86803 HEPATITIS C ANTIBODY: ICD-10-PCS | Mod: ,,, | Performed by: CLINICAL MEDICAL LABORATORY

## 2022-04-13 PROCEDURE — 86803 HEPATITIS C AB TEST: CPT | Mod: ,,, | Performed by: CLINICAL MEDICAL LABORATORY

## 2022-04-13 PROCEDURE — 80061 LIPID PANEL: ICD-10-PCS | Mod: ,,, | Performed by: CLINICAL MEDICAL LABORATORY

## 2022-04-13 PROCEDURE — 80053 COMPREHENSIVE METABOLIC PANEL: ICD-10-PCS | Mod: ,,, | Performed by: CLINICAL MEDICAL LABORATORY

## 2022-04-13 PROCEDURE — 83036 HEMOGLOBIN GLYCOSYLATED A1C: CPT | Mod: ,,, | Performed by: CLINICAL MEDICAL LABORATORY

## 2022-04-13 PROCEDURE — 83036 HEMOGLOBIN A1C: ICD-10-PCS | Mod: ,,, | Performed by: CLINICAL MEDICAL LABORATORY

## 2022-04-13 PROCEDURE — 3008F PR BODY MASS INDEX (BMI) DOCUMENTED: ICD-10-PCS | Mod: ,,, | Performed by: NURSE PRACTITIONER

## 2022-04-13 PROCEDURE — 82043 MICROALBUMIN / CREATININE RATIO URINE: ICD-10-PCS | Mod: ,,, | Performed by: CLINICAL MEDICAL LABORATORY

## 2022-04-13 PROCEDURE — 82570 MICROALBUMIN / CREATININE RATIO URINE: ICD-10-PCS | Mod: ,,, | Performed by: CLINICAL MEDICAL LABORATORY

## 2022-04-13 PROCEDURE — 3074F PR MOST RECENT SYSTOLIC BLOOD PRESSURE < 130 MM HG: ICD-10-PCS | Mod: ,,, | Performed by: NURSE PRACTITIONER

## 2022-04-13 PROCEDURE — 85025 CBC WITH DIFFERENTIAL: ICD-10-PCS | Mod: ,,, | Performed by: CLINICAL MEDICAL LABORATORY

## 2022-04-13 PROCEDURE — 3078F PR MOST RECENT DIASTOLIC BLOOD PRESSURE < 80 MM HG: ICD-10-PCS | Mod: ,,, | Performed by: NURSE PRACTITIONER

## 2022-04-13 PROCEDURE — 3074F SYST BP LT 130 MM HG: CPT | Mod: ,,, | Performed by: NURSE PRACTITIONER

## 2022-04-13 PROCEDURE — 99214 PR OFFICE/OUTPT VISIT, EST, LEVL IV, 30-39 MIN: ICD-10-PCS | Mod: ,,, | Performed by: NURSE PRACTITIONER

## 2022-04-13 PROCEDURE — 82043 UR ALBUMIN QUANTITATIVE: CPT | Mod: ,,, | Performed by: CLINICAL MEDICAL LABORATORY

## 2022-04-13 PROCEDURE — 1160F PR REVIEW ALL MEDS BY PRESCRIBER/CLIN PHARMACIST DOCUMENTED: ICD-10-PCS | Mod: ,,, | Performed by: NURSE PRACTITIONER

## 2022-04-13 PROCEDURE — 1159F MED LIST DOCD IN RCRD: CPT | Mod: ,,, | Performed by: NURSE PRACTITIONER

## 2022-04-13 PROCEDURE — 80053 COMPREHEN METABOLIC PANEL: CPT | Mod: ,,, | Performed by: CLINICAL MEDICAL LABORATORY

## 2022-04-13 PROCEDURE — 85025 COMPLETE CBC W/AUTO DIFF WBC: CPT | Mod: ,,, | Performed by: CLINICAL MEDICAL LABORATORY

## 2022-04-13 PROCEDURE — 3078F DIAST BP <80 MM HG: CPT | Mod: ,,, | Performed by: NURSE PRACTITIONER

## 2022-04-13 PROCEDURE — 99214 OFFICE O/P EST MOD 30 MIN: CPT | Mod: ,,, | Performed by: NURSE PRACTITIONER

## 2022-04-13 RX ORDER — TOPIRAMATE 50 MG/1
50 TABLET, FILM COATED ORAL 2 TIMES DAILY
Qty: 180 TABLET | Refills: 0 | Status: SHIPPED | OUTPATIENT
Start: 2022-04-13 | End: 2022-06-30 | Stop reason: SDUPTHER

## 2022-04-13 RX ORDER — SEMAGLUTIDE 1.34 MG/ML
0.5 INJECTION, SOLUTION SUBCUTANEOUS
Qty: 3 PEN | Refills: 0 | Status: SHIPPED | OUTPATIENT
Start: 2022-04-13 | End: 2022-06-30 | Stop reason: SDUPTHER

## 2022-04-13 RX ORDER — ALBUTEROL SULFATE 90 UG/1
2 AEROSOL, METERED RESPIRATORY (INHALATION) EVERY 6 HOURS PRN
Qty: 18 G | Refills: 2 | Status: SHIPPED | OUTPATIENT
Start: 2022-04-13 | End: 2024-01-24 | Stop reason: SDUPTHER

## 2022-04-13 RX ORDER — SEMAGLUTIDE 1.34 MG/ML
0.5 INJECTION, SOLUTION SUBCUTANEOUS
COMMUNITY
End: 2022-04-13 | Stop reason: SDUPTHER

## 2022-04-13 RX ORDER — METFORMIN HYDROCHLORIDE 500 MG/1
500 TABLET ORAL 2 TIMES DAILY WITH MEALS
Qty: 180 TABLET | Refills: 0 | Status: SHIPPED | OUTPATIENT
Start: 2022-04-13 | End: 2022-06-28 | Stop reason: SDUPTHER

## 2022-04-13 RX ORDER — LINAGLIPTIN 5 MG/1
5 TABLET, FILM COATED ORAL DAILY
Qty: 90 TABLET | Refills: 0 | Status: SHIPPED | OUTPATIENT
Start: 2022-04-13 | End: 2022-06-30 | Stop reason: SDUPTHER

## 2022-04-13 RX ORDER — OMEPRAZOLE 20 MG/1
20 CAPSULE, DELAYED RELEASE ORAL DAILY
Qty: 90 CAPSULE | Refills: 0 | Status: SHIPPED | OUTPATIENT
Start: 2022-04-13 | End: 2022-06-30 | Stop reason: SDUPTHER

## 2022-04-13 NOTE — PATIENT INSTRUCTIONS
Referral to Dr Dobbs, pain management  Referral to Dr LU West, dermatology   Lab obtained in clinic today, we will notify you of results and any necessary changes to plan of care    Refills on routine medications   Follow up in three months and as needed

## 2022-04-13 NOTE — PROGRESS NOTES
"Clinic note     Patient name: Tequila Vázquez is a 47 y.o. female   Chief compliant   Chief Complaint   Patient presents with    Medication Refill     Has had to use inhaler more recently.     Rash     States she has moles on her back that have been bothering her. Itching. She has tried different lotions.     Shoulder Pain     Right shoulder toward the back shoulder blades. Week and half.        Subjective     History of present illness   In clinic for routine follow up and medication refills   Hx of right eyelid ptosis and weakness of RUE and RLE, myasthenia gravis; recently seen by Monique FNP neurology and Dr Desir neurology [facility]  Hx of DM, checking blood glucose daily with readings reported  fasting and 110-115   Last a1c was 6.0  Tolerating all medications without problems   She is requesting referral to dermatology, she has two moles on upper back that are pruritic   Reports right shoulder and shoulder blade pain for "a long time", requesting referral to Dr Dobbs, pain management [facility] of [place]   Past Medical History: asthma, DM type 2, latent syphilis, myasthenia gravis, hyperlipidemia            Social History     Tobacco Use    Smoking status: Never Smoker    Smokeless tobacco: Current User     Types: Snuff   Substance Use Topics    Alcohol use: Never    Drug use: Never       Review of patient's allergies indicates:   Allergen Reactions    Aspirin Hives       Past Medical History:   Diagnosis Date    Asthma     Diabetes mellitus     Late syphilis, 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 Added automatically from request for surgery [id number]       Past Surgical History:   Procedure Laterality Date    DILATION AND CURETTAGE OF UTERUS  2000    preformed at [facility]    HYSTERECTOMY      OOPHORECTOMY      pt has had one ovary removed, unsure side    TUBAL LIGATION  1999    [facility]        Family " History   Problem Relation Age of Onset    Asthma Mother     Coronary artery disease Mother     Diabetes Father     Heart disease Father     Cancer Sister         Breast    Breast cancer Sister          Current Outpatient Medications:     gabapentin (NEURONTIN) 100 MG capsule, Take 1 capsule (100 mg total) by mouth 3 (three) times daily., Disp: 90 capsule, Rfl: 11    lovastatin (MEVACOR) 20 MG tablet, Take 1 tablet (20 mg total) by mouth every evening., Disp: 90 tablet, Rfl: 0    predniSONE (DELTASONE) 20 MG tablet, Take 20 mg by mouth 2 (two) times daily., Disp: , Rfl:     pyridostigmine (MESTINON) 60 mg Tab, Take 60 mg by mouth 3 (three) times daily., Disp: , Rfl:     albuterol (PROVENTIL/VENTOLIN HFA) 90 mcg/actuation inhaler, Inhale 2 puffs into the lungs every 6 (six) hours as needed for Wheezing. Rescue, Disp: 18 g, Rfl: 2    lancets (E-Z JECT LANCETS) 32 gauge Misc, 100 lancets by Misc.(Non-Drug; Combo Route) route. USE TO CHECK GLUCOSE ONCE DAILY, Disp: , Rfl:     linaGLIPtin (TRADJENTA) 5 mg Tab tablet, Take 1 tablet (5 mg total) by mouth once daily., Disp: 90 tablet, Rfl: 0    metFORMIN (GLUCOPHAGE) 500 MG tablet, Take 1 tablet (500 mg total) by mouth 2 (two) times daily with meals., Disp: 180 tablet, Rfl: 0    metroNIDAZOLE (FLAGYL) 500 MG tablet, Take 500 mg by mouth every 12 (twelve) hours., Disp: , Rfl:     omeprazole (PRILOSEC) 20 MG capsule, Take 1 capsule (20 mg total) by mouth once daily., Disp: 90 capsule, Rfl: 0    semaglutide (OZEMPIC) 0.25 mg or 0.5 mg(2 mg/1.5 mL) pen injector, Inject 0.5 mg into the skin every 7 days., Disp: 3 pen, Rfl: 0    topiramate (TOPAMAX) 50 MG tablet, Take 1 tablet (50 mg total) by mouth 2 (two) times daily., Disp: 180 tablet, Rfl: 0    Review of Systems   Constitutional: Positive for fatigue. Negative for appetite change, chills, fever and unexpected weight change.   Eyes: Negative for visual disturbance.   Respiratory: Negative for cough and shortness of breath.   "  Cardiovascular: Negative for chest pain, palpitations and leg swelling.   Gastrointestinal: Negative for abdominal pain, change in bowel habit, constipation, diarrhea, nausea, vomiting and change in bowel habit.   Endocrine: Negative for polydipsia and polyuria.   Genitourinary: Negative for dysuria.   Musculoskeletal: Positive for arthralgias and myalgias. Negative for gait problem.   Integumentary:  Positive for mole/lesion. Negative for wound.   Neurological: Positive for weakness. Negative for dizziness, syncope, light-headedness and headaches.   Psychiatric/Behavioral: Negative for confusion, dysphoric mood and sleep disturbance. The patient is not nervous/anxious.        Objective     /73 (BP Location: Right arm, Patient Position: Sitting, BP Method: Large (Automatic))   Pulse 84   Temp 97.2 °F (36.2 °C)   Ht 5' 5" (1.651 m)   Wt 97.5 kg (215 lb)   SpO2 99%   BMI 35.78 kg/m²     Physical Exam   Constitutional: She is oriented to person, place, and time. No distress.   HENT:   Head: Normocephalic and atraumatic.   Mouth/Throat: Mucous membranes are moist.   Eyes: Pupils are equal, round, and reactive to light. Conjunctivae are normal.   Cardiovascular: Normal rate and regular rhythm.   Pulmonary/Chest: Effort normal and breath sounds normal. No respiratory distress. She has no wheezes. She has no rhonchi. She has no rales.   Abdominal: Soft. Bowel sounds are normal. She exhibits no distension. There is no abdominal tenderness.   Musculoskeletal:         General: Normal range of motion.      Right shoulder: Tenderness present.      Cervical back: Normal range of motion and neck supple.      Right lower leg: No edema.      Left lower leg: No edema.        Feet:       Comments: Trapezius tenderness    Feet:   Right Foot:   Protective Sensation: 10 sites tested. 10 sites sensed.   Skin Integrity: Positive for callus.   Left Foot:   Protective Sensation: 10 sites tested. 10 sites sensed.   Skin " Integrity: Positive for callus.   Neurological: She is alert and oriented to person, place, and time. Gait normal.   Skin: Skin is warm and dry.        Psychiatric: Her behavior is normal. Mood normal.       Lab Results   Component Value Date    WBC 5.47 08/13/2021    HGB 13.5 08/13/2021    HCT 40.7 08/13/2021    MCV 93.6 08/13/2021     08/13/2021       CMP  Sodium   Date Value Ref Range Status   08/13/2021 142 136 - 145 mmol/L Final     Potassium   Date Value Ref Range Status   08/13/2021 4.2 3.5 - 5.1 mmol/L Final     Chloride   Date Value Ref Range Status   08/13/2021 110 (H) 98 - 107 mmol/L Final     CO2   Date Value Ref Range Status   08/13/2021 26 21 - 32 mmol/L Final     Glucose   Date Value Ref Range Status   08/13/2021 91 74 - 106 mg/dL Final     BUN   Date Value Ref Range Status   08/13/2021 9 7 - 18 mg/dL Final     Creatinine   Date Value Ref Range Status   08/13/2021 1.11 (H) 0.55 - 1.02 mg/dL Final     Calcium   Date Value Ref Range Status   08/13/2021 8.9 8.5 - 10.1 mg/dL Final     Total Protein   Date Value Ref Range Status   08/13/2021 7.9 6.4 - 8.2 g/dL Final     Albumin   Date Value Ref Range Status   08/13/2021 3.9 3.5 - 5.0 g/dL Final     Bilirubin, Total   Date Value Ref Range Status   08/13/2021 0.3 >0.0 - 1.2 mg/dL Final     Alk Phos   Date Value Ref Range Status   08/13/2021 86 39 - 100 U/L Final     AST   Date Value Ref Range Status   08/13/2021 21 15 - 37 U/L Final     ALT   Date Value Ref Range Status   08/13/2021 24 13 - 56 U/L Final     Anion Gap   Date Value Ref Range Status   08/13/2021 10 7 - 16 mmol/L Final     eGFR    Date Value Ref Range Status   08/13/2021 67 >=60 mL/min/1.73m² Final     eGFR   Date Value Ref Range Status   04/19/2021 59 (L) >=60 mL/min/1.73m² Final     Lab Results   Component Value Date    TSH 0.766 08/13/2021     Lab Results   Component Value Date    CHOL 250 (H) 04/19/2021     Lab Results   Component Value Date    HDL 54 04/19/2021      Lab Results   Component Value Date    LDLCALC 166 04/19/2021     Lab Results   Component Value Date    TRIG 150 04/19/2021     Lab Results   Component Value Date    CHOLHDL 4.6 04/19/2021     Lab Results   Component Value Date    HGBA1C 6.0 12/28/2021         Assessment and Plan   Type 2 diabetes mellitus without complication, without long-term current use of insulin  -     metFORMIN (GLUCOPHAGE) 500 MG tablet; Take 1 tablet (500 mg total) by mouth 2 (two) times daily with meals.  Dispense: 180 tablet; Refill: 0  -     linaGLIPtin (TRADJENTA) 5 mg Tab tablet; Take 1 tablet (5 mg total) by mouth once daily.  Dispense: 90 tablet; Refill: 0  -     Hemoglobin A1C; Future; Expected date: 04/13/2022  -     Microalbumin/Creatinine Ratio, Urine; Future; Expected date: 04/13/2022    Migraine without aura and without status migrainosus, not intractable  -     topiramate (TOPAMAX) 50 MG tablet; Take 1 tablet (50 mg total) by mouth 2 (two) times daily.  Dispense: 180 tablet; Refill: 0    Gastroesophageal reflux disease, unspecified whether esophagitis present  -     omeprazole (PRILOSEC) 20 MG capsule; Take 1 capsule (20 mg total) by mouth once daily.  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 Rescue  Dispense: 18 g; Refill: 2    Other orders  -     semaglutide (OZEMPIC) 0.25 mg or 0.5 mg(2 mg/1.5 mL) pen injector; Inject 0.5 mg into the skin every 7 days.  Dispense: 3 pen; Refill: 0          Patient Instructions  Patient Instructions   Referral to Dr Dobbs, pain management  Referral to Dr LU West, dermatology   Lab obtained in clinic today, we will notify you of results and any necessary changes to plan of care    Refills on routine medications   Follow up in three months and as needed            I have personally reviewed the encounter note and agree with the assessment and plan as put forth by the nurse practitioner.  Dr. Angel Phillips M.D.

## 2022-04-14 LAB
CREAT UR-MCNC: 284 MG/DL (ref 28–219)
EST. AVERAGE GLUCOSE BLD GHB EST-MCNC: 140 MG/DL
HBA1C MFR BLD HPLC: 6.8 % (ref 4.5–6.6)
MICROALBUMIN UR-MCNC: 1.2 MG/DL (ref 0–2.8)
MICROALBUMIN/CREAT RATIO PNL UR: 4.2 MG/G (ref 0–30)

## 2022-04-18 ENCOUNTER — HOSPITAL ENCOUNTER (OUTPATIENT)
Dept: RADIOLOGY | Facility: HOSPITAL | Age: 47
Discharge: HOME | End: 2022-04-18
Attending: ANESTHESIOLOGY
Payer: COMMERCIAL

## 2022-04-18 DIAGNOSIS — R52 PAIN: ICD-10-CM

## 2022-04-18 PROCEDURE — 72050 X-RAY EXAM NECK SPINE 4/5VWS: CPT | Mod: TC

## 2022-04-18 PROCEDURE — 72050 XR CERVICAL SPINE AP LAT WITH FLEX EXTEN: ICD-10-PCS | Mod: 26,,, | Performed by: RADIOLOGY

## 2022-04-18 PROCEDURE — 72050 X-RAY EXAM NECK SPINE 4/5VWS: CPT | Mod: 26,,, | Performed by: RADIOLOGY

## 2022-05-20 ENCOUNTER — OFFICE VISIT (OUTPATIENT)
Dept: DERMATOLOGY | Facility: CLINIC | Age: 47
End: 2022-05-20
Payer: MEDICAID

## 2022-05-20 VITALS — WEIGHT: 215 LBS | BODY MASS INDEX: 35.82 KG/M2 | HEIGHT: 65 IN | RESPIRATION RATE: 18 BRPM

## 2022-05-20 DIAGNOSIS — L82.0 INFLAMED SEBORRHEIC KERATOSIS: Primary | ICD-10-CM

## 2022-05-20 DIAGNOSIS — D22.9 SKIN MOLE: ICD-10-CM

## 2022-05-20 PROCEDURE — 17110 PR DESTRUCTION BENIGN LESIONS UP TO 14: ICD-10-PCS | Mod: ,,, | Performed by: STUDENT IN AN ORGANIZED HEALTH CARE EDUCATION/TRAINING PROGRAM

## 2022-05-20 PROCEDURE — 3066F NEPHROPATHY DOC TX: CPT | Mod: CPTII,,, | Performed by: STUDENT IN AN ORGANIZED HEALTH CARE EDUCATION/TRAINING PROGRAM

## 2022-05-20 PROCEDURE — 1159F MED LIST DOCD IN RCRD: CPT | Mod: CPTII,,, | Performed by: STUDENT IN AN ORGANIZED HEALTH CARE EDUCATION/TRAINING PROGRAM

## 2022-05-20 PROCEDURE — 3061F PR NEG MICROALBUMINURIA RESULT DOCUMENTED/REVIEW: ICD-10-PCS | Mod: CPTII,,, | Performed by: STUDENT IN AN ORGANIZED HEALTH CARE EDUCATION/TRAINING PROGRAM

## 2022-05-20 PROCEDURE — 1160F RVW MEDS BY RX/DR IN RCRD: CPT | Mod: CPTII,,, | Performed by: STUDENT IN AN ORGANIZED HEALTH CARE EDUCATION/TRAINING PROGRAM

## 2022-05-20 PROCEDURE — 1159F PR MEDICATION LIST DOCUMENTED IN MEDICAL RECORD: ICD-10-PCS | Mod: CPTII,,, | Performed by: STUDENT IN AN ORGANIZED HEALTH CARE EDUCATION/TRAINING PROGRAM

## 2022-05-20 PROCEDURE — 3008F PR BODY MASS INDEX (BMI) DOCUMENTED: ICD-10-PCS | Mod: CPTII,,, | Performed by: STUDENT IN AN ORGANIZED HEALTH CARE EDUCATION/TRAINING PROGRAM

## 2022-05-20 PROCEDURE — 3066F PR DOCUMENTATION OF TREATMENT FOR NEPHROPATHY: ICD-10-PCS | Mod: CPTII,,, | Performed by: STUDENT IN AN ORGANIZED HEALTH CARE EDUCATION/TRAINING PROGRAM

## 2022-05-20 PROCEDURE — 3008F BODY MASS INDEX DOCD: CPT | Mod: CPTII,,, | Performed by: STUDENT IN AN ORGANIZED HEALTH CARE EDUCATION/TRAINING PROGRAM

## 2022-05-20 PROCEDURE — 1160F PR REVIEW ALL MEDS BY PRESCRIBER/CLIN PHARMACIST DOCUMENTED: ICD-10-PCS | Mod: CPTII,,, | Performed by: STUDENT IN AN ORGANIZED HEALTH CARE EDUCATION/TRAINING PROGRAM

## 2022-05-20 PROCEDURE — 3044F HG A1C LEVEL LT 7.0%: CPT | Mod: CPTII,,, | Performed by: STUDENT IN AN ORGANIZED HEALTH CARE EDUCATION/TRAINING PROGRAM

## 2022-05-20 PROCEDURE — 3061F NEG MICROALBUMINURIA REV: CPT | Mod: CPTII,,, | Performed by: STUDENT IN AN ORGANIZED HEALTH CARE EDUCATION/TRAINING PROGRAM

## 2022-05-20 PROCEDURE — 99202 PR OFFICE/OUTPT VISIT, NEW, LEVL II, 15-29 MIN: ICD-10-PCS | Mod: 25,,, | Performed by: STUDENT IN AN ORGANIZED HEALTH CARE EDUCATION/TRAINING PROGRAM

## 2022-05-20 PROCEDURE — 17110 DESTRUCTION B9 LES UP TO 14: CPT | Mod: ,,, | Performed by: STUDENT IN AN ORGANIZED HEALTH CARE EDUCATION/TRAINING PROGRAM

## 2022-05-20 PROCEDURE — 99202 OFFICE O/P NEW SF 15 MIN: CPT | Mod: 25,,, | Performed by: STUDENT IN AN ORGANIZED HEALTH CARE EDUCATION/TRAINING PROGRAM

## 2022-05-20 PROCEDURE — 3044F PR MOST RECENT HEMOGLOBIN A1C LEVEL <7.0%: ICD-10-PCS | Mod: CPTII,,, | Performed by: STUDENT IN AN ORGANIZED HEALTH CARE EDUCATION/TRAINING PROGRAM

## 2022-05-20 NOTE — PROGRESS NOTES
[facility]  Tim West MD    [address] [place] [id number]  [phone]    Fax: [phone]    Patient Name: Tequila Vázquez  Medical Record Number: [id number]  PCP: MEENA Garces  Age: 47 y.o. : 1975  Contact: [phone] (home)     CC: moles  History of Present Illness:     Tequila Vázquez is a 47 y.o.  female with no history of skin cancer  who presents to clinic today for moles on left cheek and back.  This has been present for several years. Symptoms include irritation and pruritus. Previous treatments include none.       The patient has no other concerns today.    Review of Systems:     Unremarkable other than mentioned above.     Physical Exam:     General: Relaxed, oriented, alert    Skin examination of the scalp, face, neck, chest, back, abdomen, upper extremities and lower extremities were normal except for as listed below      Assessment and Plan:     1. Irritated Seborrheic Keratoses (L82.0)  Stuck-on inflamed papules with crust located on the left cheek, back x2  Associated diagnoses: Pruritus and Cutaneous Inflammation    Plan: Liquid Nitrogen.  A total of 2 lesions were treated with liquid nitrogen, located on the above listed location.  This procedure was medically necessary because the lesions that were treated were: irritated and itchy. The  patient's consent was obtained including but not limited to risks of crusting, scabbing, blistering, scarring, darker  or lighter pigmentary change, recurrence, incomplete removal and infection.      Return to clinic in PRN    AVS printed with patient instructions     Tim West MD   Mohs Surgery/Dermatologic Oncology  Dermatology

## 2022-06-16 ENCOUNTER — HOSPITAL ENCOUNTER (OUTPATIENT)
Dept: RADIOLOGY | Facility: HOSPITAL | Age: 47
Discharge: HOME | End: 2022-06-16
Payer: COMMERCIAL

## 2022-06-16 VITALS — BODY MASS INDEX: 35.82 KG/M2 | WEIGHT: 215 LBS | HEIGHT: 65 IN

## 2022-06-16 DIAGNOSIS — Z12.31 ENCOUNTER FOR SCREENING MAMMOGRAM FOR MALIGNANT NEOPLASM OF BREAST: ICD-10-CM

## 2022-06-16 DIAGNOSIS — Z12.31 OTHER SCREENING MAMMOGRAM: ICD-10-CM

## 2022-06-16 PROCEDURE — 77067 SCR MAMMO BI INCL CAD: CPT | Mod: TC

## 2022-06-23 ENCOUNTER — OFFICE VISIT (OUTPATIENT)
Dept: DERMATOLOGY | Facility: CLINIC | Age: 47
End: 2022-06-23
Payer: COMMERCIAL

## 2022-06-23 VITALS — RESPIRATION RATE: 18 BRPM | BODY MASS INDEX: 35.81 KG/M2 | HEIGHT: 65 IN | WEIGHT: 214.94 LBS

## 2022-06-23 DIAGNOSIS — D48.9 NEOPLASM OF UNCERTAIN BEHAVIOR: Primary | ICD-10-CM

## 2022-06-23 PROCEDURE — 3008F BODY MASS INDEX DOCD: CPT | Mod: CPTII,,, | Performed by: STUDENT IN AN ORGANIZED HEALTH CARE EDUCATION/TRAINING PROGRAM

## 2022-06-23 PROCEDURE — 1160F PR REVIEW ALL MEDS BY PRESCRIBER/CLIN PHARMACIST DOCUMENTED: ICD-10-PCS | Mod: CPTII,,, | Performed by: STUDENT IN AN ORGANIZED HEALTH CARE EDUCATION/TRAINING PROGRAM

## 2022-06-23 PROCEDURE — 88305 PATHOLOGY, DERMATOLOGY: ICD-10-PCS | Mod: 26,,, | Performed by: PATHOLOGY

## 2022-06-23 PROCEDURE — 1159F MED LIST DOCD IN RCRD: CPT | Mod: CPTII,,, | Performed by: STUDENT IN AN ORGANIZED HEALTH CARE EDUCATION/TRAINING PROGRAM

## 2022-06-23 PROCEDURE — 3008F PR BODY MASS INDEX (BMI) DOCUMENTED: ICD-10-PCS | Mod: CPTII,,, | Performed by: STUDENT IN AN ORGANIZED HEALTH CARE EDUCATION/TRAINING PROGRAM

## 2022-06-23 PROCEDURE — 3061F NEG MICROALBUMINURIA REV: CPT | Mod: CPTII,,, | Performed by: STUDENT IN AN ORGANIZED HEALTH CARE EDUCATION/TRAINING PROGRAM

## 2022-06-23 PROCEDURE — 99499 NO LOS: ICD-10-PCS | Mod: ,,, | Performed by: STUDENT IN AN ORGANIZED HEALTH CARE EDUCATION/TRAINING PROGRAM

## 2022-06-23 PROCEDURE — 11305 SHAVE SKIN LESION 0.5 CM/<: CPT | Mod: 51,,, | Performed by: STUDENT IN AN ORGANIZED HEALTH CARE EDUCATION/TRAINING PROGRAM

## 2022-06-23 PROCEDURE — 88342 IMHCHEM/IMCYTCHM 1ST ANTB: CPT | Mod: 26,,, | Performed by: PATHOLOGY

## 2022-06-23 PROCEDURE — 1160F RVW MEDS BY RX/DR IN RCRD: CPT | Mod: CPTII,,, | Performed by: STUDENT IN AN ORGANIZED HEALTH CARE EDUCATION/TRAINING PROGRAM

## 2022-06-23 PROCEDURE — 88305 TISSUE EXAM BY PATHOLOGIST: CPT | Mod: 26,,, | Performed by: PATHOLOGY

## 2022-06-23 PROCEDURE — 11302 PR SHAV SKIN LES 1.1-2.0 CM TRUNK,ARM,LEG: ICD-10-PCS | Mod: ,,, | Performed by: STUDENT IN AN ORGANIZED HEALTH CARE EDUCATION/TRAINING PROGRAM

## 2022-06-23 PROCEDURE — 11305 PR SHAV SKIN LES <0.5 CM REMAINDER BODY: ICD-10-PCS | Mod: 51,,, | Performed by: STUDENT IN AN ORGANIZED HEALTH CARE EDUCATION/TRAINING PROGRAM

## 2022-06-23 PROCEDURE — 3066F PR DOCUMENTATION OF TREATMENT FOR NEPHROPATHY: ICD-10-PCS | Mod: CPTII,,, | Performed by: STUDENT IN AN ORGANIZED HEALTH CARE EDUCATION/TRAINING PROGRAM

## 2022-06-23 PROCEDURE — 3044F PR MOST RECENT HEMOGLOBIN A1C LEVEL <7.0%: ICD-10-PCS | Mod: CPTII,,, | Performed by: STUDENT IN AN ORGANIZED HEALTH CARE EDUCATION/TRAINING PROGRAM

## 2022-06-23 PROCEDURE — 88342 PATHOLOGY, DERMATOLOGY: ICD-10-PCS | Mod: 26,,, | Performed by: PATHOLOGY

## 2022-06-23 PROCEDURE — 88305 TISSUE EXAM BY PATHOLOGIST: CPT | Mod: SUR,59 | Performed by: STUDENT IN AN ORGANIZED HEALTH CARE EDUCATION/TRAINING PROGRAM

## 2022-06-23 PROCEDURE — 99499 UNLISTED E&M SERVICE: CPT | Mod: ,,, | Performed by: STUDENT IN AN ORGANIZED HEALTH CARE EDUCATION/TRAINING PROGRAM

## 2022-06-23 PROCEDURE — 3061F PR NEG MICROALBUMINURIA RESULT DOCUMENTED/REVIEW: ICD-10-PCS | Mod: CPTII,,, | Performed by: STUDENT IN AN ORGANIZED HEALTH CARE EDUCATION/TRAINING PROGRAM

## 2022-06-23 PROCEDURE — 3044F HG A1C LEVEL LT 7.0%: CPT | Mod: CPTII,,, | Performed by: STUDENT IN AN ORGANIZED HEALTH CARE EDUCATION/TRAINING PROGRAM

## 2022-06-23 PROCEDURE — 3066F NEPHROPATHY DOC TX: CPT | Mod: CPTII,,, | Performed by: STUDENT IN AN ORGANIZED HEALTH CARE EDUCATION/TRAINING PROGRAM

## 2022-06-23 PROCEDURE — 88341 IMHCHEM/IMCYTCHM EA ADD ANTB: CPT | Mod: 26,,, | Performed by: PATHOLOGY

## 2022-06-23 PROCEDURE — 88341 PATHOLOGY, DERMATOLOGY: ICD-10-PCS | Mod: 26,,, | Performed by: PATHOLOGY

## 2022-06-23 PROCEDURE — 1159F PR MEDICATION LIST DOCUMENTED IN MEDICAL RECORD: ICD-10-PCS | Mod: CPTII,,, | Performed by: STUDENT IN AN ORGANIZED HEALTH CARE EDUCATION/TRAINING PROGRAM

## 2022-06-23 PROCEDURE — 11302 SHAVE SKIN LESION 1.1-2.0 CM: CPT | Mod: ,,, | Performed by: STUDENT IN AN ORGANIZED HEALTH CARE EDUCATION/TRAINING PROGRAM

## 2022-06-23 NOTE — PROGRESS NOTES
[facility]  Tim West MD    [address] [place] [address]  [phone]    Fax: [phone]    Patient Name: Tequila Vázquez  Medical Record Number: [id number]  PCP: MEENA Garces  Age: 47 y.o. : 1975  Contact: [phone] (home)     History of Present Illness:     Tequila Vázquez is a 47 y.o.  female here for follow up of inflamed seborrheic keratoses. Patient last seen by dermatology 22. At last appointment 2 inflamed seborrheic keratoses were treated with LN2. Patient states one resolved and one still remains and they have grown and are tender.     The patient has no other concerns today.    Review of Systems:     Unremarkable other than mentioned above.     Physical Exam:     General: Relaxed, oriented, alert    Skin examination of the scalp, face, neck, chest, back, abdomen, upper extremities and lower extremities were normal except for as listed below      Assessment and Plan:     1. Neoplasm of Uncertain Behavior x 2     A) brown papule located on the neck (0.5 cm)   Ddx includes: sk vs idn vs df vs r/o melanoma    B) brown papule located on the left upper back (1.2 cm)   Ddx includes: sk vs idn vs df vs r/o melanoma      Shave removal    Pre-procedure Diagnosis: as above  Post_procedure Diagnosis: same  Estimated Blood Loss: <1cc    Findings: None  Complications: None  Specimens: to pathology      Written informed consent was obtained after discussing risks including pain, bleeding, infection, recurrence and scarring. The biopsy site was sterilely prepped with alcohol, which was allowed to dry completely, then locally infiltrated with 1% lidocaine with epinephrine, ~3 cc total. A shave removal was obtained using a Dermablade/15 and the specimen was sent to dermatopathology. Aluminum chloride was used for hemostasis. Antibiotic ointment and a clean dressing were applied. The patient tolerated the procedure well without complications.  Verbal and written wound care instructions were given.          Return to clinic PRN    AVS printed with patient instructions     Tim West MD   Mohs Surgery/Dermatologic Oncology  Dermatology

## 2022-06-27 LAB
DHEA SERPL-MCNC: NORMAL
ESTROGEN SERPL-MCNC: NORMAL PG/ML
INSULIN SERPL-ACNC: NORMAL U[IU]/ML
LAB AP GROSS DESCRIPTION: NORMAL
LAB AP LABORATORY NOTES: NORMAL
LAB AP SPEC A DDX: NORMAL
LAB AP SPEC A MORPHOLOGY: NORMAL
LAB AP SPEC A PROCEDURE: NORMAL
LAB AP SPEC B DDX: NORMAL
LAB AP SPEC B MORPHOLOGY: NORMAL
LAB AP SPEC B PROCEDURE: NORMAL
T3RU NFR SERPL: NORMAL %

## 2022-06-28 DIAGNOSIS — E11.9 TYPE 2 DIABETES MELLITUS WITHOUT COMPLICATION, WITHOUT LONG-TERM CURRENT USE OF INSULIN: ICD-10-CM

## 2022-06-28 RX ORDER — METFORMIN HYDROCHLORIDE 500 MG/1
500 TABLET ORAL 2 TIMES DAILY WITH MEALS
Qty: 180 TABLET | Refills: 0 | Status: SHIPPED | OUTPATIENT
Start: 2022-06-28 | End: 2022-06-30 | Stop reason: SDUPTHER

## 2022-06-30 ENCOUNTER — OFFICE VISIT (OUTPATIENT)
Dept: FAMILY MEDICINE | Facility: CLINIC | Age: 47
End: 2022-06-30
Payer: COMMERCIAL

## 2022-06-30 VITALS
HEIGHT: 65 IN | HEART RATE: 77 BPM | DIASTOLIC BLOOD PRESSURE: 76 MMHG | WEIGHT: 211 LBS | SYSTOLIC BLOOD PRESSURE: 107 MMHG | BODY MASS INDEX: 35.16 KG/M2

## 2022-06-30 DIAGNOSIS — G43.009 MIGRAINE WITHOUT AURA AND WITHOUT STATUS MIGRAINOSUS, NOT INTRACTABLE: ICD-10-CM

## 2022-06-30 DIAGNOSIS — E78.5 HYPERLIPIDEMIA, UNSPECIFIED HYPERLIPIDEMIA TYPE: ICD-10-CM

## 2022-06-30 DIAGNOSIS — E11.9 TYPE 2 DIABETES MELLITUS WITHOUT COMPLICATION, WITHOUT LONG-TERM CURRENT USE OF INSULIN: Primary | ICD-10-CM

## 2022-06-30 DIAGNOSIS — K21.9 GASTROESOPHAGEAL REFLUX DISEASE, UNSPECIFIED WHETHER ESOPHAGITIS PRESENT: ICD-10-CM

## 2022-06-30 PROCEDURE — 3074F PR MOST RECENT SYSTOLIC BLOOD PRESSURE < 130 MM HG: ICD-10-PCS | Mod: ,,, | Performed by: NURSE PRACTITIONER

## 2022-06-30 PROCEDURE — 1160F RVW MEDS BY RX/DR IN RCRD: CPT | Mod: ,,, | Performed by: NURSE PRACTITIONER

## 2022-06-30 PROCEDURE — 3061F NEG MICROALBUMINURIA REV: CPT | Mod: ,,, | Performed by: NURSE PRACTITIONER

## 2022-06-30 PROCEDURE — 3078F DIAST BP <80 MM HG: CPT | Mod: ,,, | Performed by: NURSE PRACTITIONER

## 2022-06-30 PROCEDURE — 99213 PR OFFICE/OUTPT VISIT, EST, LEVL III, 20-29 MIN: ICD-10-PCS | Mod: ,,, | Performed by: NURSE PRACTITIONER

## 2022-06-30 PROCEDURE — 1159F MED LIST DOCD IN RCRD: CPT | Mod: ,,, | Performed by: NURSE PRACTITIONER

## 2022-06-30 PROCEDURE — 3066F NEPHROPATHY DOC TX: CPT | Mod: ,,, | Performed by: NURSE PRACTITIONER

## 2022-06-30 PROCEDURE — 3008F BODY MASS INDEX DOCD: CPT | Mod: ,,, | Performed by: NURSE PRACTITIONER

## 2022-06-30 PROCEDURE — 1159F PR MEDICATION LIST DOCUMENTED IN MEDICAL RECORD: ICD-10-PCS | Mod: ,,, | Performed by: NURSE PRACTITIONER

## 2022-06-30 PROCEDURE — 3074F SYST BP LT 130 MM HG: CPT | Mod: ,,, | Performed by: NURSE PRACTITIONER

## 2022-06-30 PROCEDURE — 3066F PR DOCUMENTATION OF TREATMENT FOR NEPHROPATHY: ICD-10-PCS | Mod: ,,, | Performed by: NURSE PRACTITIONER

## 2022-06-30 PROCEDURE — 99213 OFFICE O/P EST LOW 20 MIN: CPT | Mod: ,,, | Performed by: NURSE PRACTITIONER

## 2022-06-30 PROCEDURE — 3044F HG A1C LEVEL LT 7.0%: CPT | Mod: ,,, | Performed by: NURSE PRACTITIONER

## 2022-06-30 PROCEDURE — 3061F PR NEG MICROALBUMINURIA RESULT DOCUMENTED/REVIEW: ICD-10-PCS | Mod: ,,, | Performed by: NURSE PRACTITIONER

## 2022-06-30 PROCEDURE — 3044F PR MOST RECENT HEMOGLOBIN A1C LEVEL <7.0%: ICD-10-PCS | Mod: ,,, | Performed by: NURSE PRACTITIONER

## 2022-06-30 PROCEDURE — 3078F PR MOST RECENT DIASTOLIC BLOOD PRESSURE < 80 MM HG: ICD-10-PCS | Mod: ,,, | Performed by: NURSE PRACTITIONER

## 2022-06-30 PROCEDURE — 3008F PR BODY MASS INDEX (BMI) DOCUMENTED: ICD-10-PCS | Mod: ,,, | Performed by: NURSE PRACTITIONER

## 2022-06-30 PROCEDURE — 1160F PR REVIEW ALL MEDS BY PRESCRIBER/CLIN PHARMACIST DOCUMENTED: ICD-10-PCS | Mod: ,,, | Performed by: NURSE PRACTITIONER

## 2022-06-30 RX ORDER — TOPIRAMATE 50 MG/1
50 TABLET, FILM COATED ORAL 2 TIMES DAILY
Qty: 180 TABLET | Refills: 0 | Status: SHIPPED | OUTPATIENT
Start: 2022-06-30 | End: 2022-09-26 | Stop reason: SDUPTHER

## 2022-06-30 RX ORDER — SEMAGLUTIDE 1.34 MG/ML
0.5 INJECTION, SOLUTION SUBCUTANEOUS
Qty: 3 PEN | Refills: 0 | Status: SHIPPED | OUTPATIENT
Start: 2022-06-30 | End: 2022-09-07 | Stop reason: SDUPTHER

## 2022-06-30 RX ORDER — METFORMIN HYDROCHLORIDE 500 MG/1
500 TABLET ORAL 2 TIMES DAILY WITH MEALS
Qty: 180 TABLET | Refills: 0 | Status: SHIPPED | OUTPATIENT
Start: 2022-06-30 | End: 2022-09-26 | Stop reason: SDUPTHER

## 2022-06-30 RX ORDER — LOVASTATIN 20 MG/1
20 TABLET ORAL NIGHTLY
Qty: 90 TABLET | Refills: 0 | Status: SHIPPED | OUTPATIENT
Start: 2022-06-30 | End: 2022-09-26 | Stop reason: ALTCHOICE

## 2022-06-30 RX ORDER — LINAGLIPTIN 5 MG/1
5 TABLET, FILM COATED ORAL DAILY
Qty: 90 TABLET | Refills: 0 | Status: SHIPPED | OUTPATIENT
Start: 2022-06-30 | End: 2022-09-26

## 2022-06-30 RX ORDER — OMEPRAZOLE 20 MG/1
20 CAPSULE, DELAYED RELEASE ORAL DAILY
Qty: 90 CAPSULE | Refills: 0 | Status: SHIPPED | OUTPATIENT
Start: 2022-06-30 | End: 2022-09-26 | Stop reason: SDUPTHER

## 2022-06-30 RX ORDER — LOVASTATIN 20 MG/1
20 TABLET ORAL NIGHTLY
Qty: 90 TABLET | Refills: 0 | Status: CANCELLED | OUTPATIENT
Start: 2022-06-30

## 2022-06-30 NOTE — PATIENT INSTRUCTIONS
Refills on routine medications   Follow up in three months and as needed   A1c will be obtained at next office visit, due after 7/12/22

## 2022-06-30 NOTE — PROGRESS NOTES
"Clinic note     Patient name: Tequila Vázquez is a 47 y.o. female   Chief compliant   Chief Complaint   Patient presents with    check up     med    Medication Refill     Pt going out of town- wants 1 month sent to pharmacy and 1 month printed to take with her       Subjective     History of present illness   In clinic for routine follow up and medication refills   Hx of right eyelid ptosis and weakness of RUE and RLE, myasthenia gravis; recently seen by Monique FNP neurology and Dr Desir neurology [facility]  Hx of DM, checking blood glucose daily, she states readings have "been really good" does not have blood glucose log in clinic for review today  She states she has been making better diet choices and is trying to walk for exercise on most days, she has lost 4 # since office visit in April   Last a1c was 6.8, due to recheck after July 11  Tolerating all medications without problems   Past Medical History: asthma, DM type 2, latent syphilis, myasthenia gravis, hyperlipidemia      Social History     Tobacco Use    Smoking status: Never Smoker    Smokeless tobacco: Current User     Types: Snuff   Substance Use Topics    Alcohol use: Never    Drug use: Never       Review of patient's allergies indicates:   Allergen Reactions    Aspirin Hives       Past Medical History:   Diagnosis Date    Asthma     Diabetes mellitus     Late syphilis, latent 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 Added automatically from request for surgery [id number]       Past Surgical History:   Procedure Laterality Date    DILATION AND CURETTAGE OF UTERUS  2000    [facility]    HYSTERECTOMY      OOPHORECTOMY      pt has had one ovary removed, unsure side    TUBAL LIGATION  1999     [facility]        Family History   Problem Relation Age of Onset    Asthma Mother     Coronary artery disease Mother     Diabetes Father     " Heart disease Father     Cancer Sister         Breast    Breast cancer Sister          Current Outpatient Medications:     albuterol (PROVENTIL/VENTOLIN HFA) 90 mcg/actuation inhaler, Inhale 2 puffs into the lungs every 6 (six) hours as needed for Wheezing. Rescue, Disp: 18 g, Rfl: 2    gabapentin (NEURONTIN) 100 MG capsule, Take 1 capsule (100 mg total) by mouth 3 (three) times daily., Disp: 90 capsule, Rfl: 11    lancets (E-Z JECT LANCETS) 32 gauge Misc, 100 lancets by Misc.(Non-Drug; Combo Route) route. USE TO CHECK GLUCOSE ONCE DAILY, Disp: , Rfl:     linaGLIPtin (TRADJENTA) 5 mg Tab tablet, Take 1 tablet (5 mg total) by mouth once daily., Disp: 90 tablet, Rfl: 0    lovastatin (MEVACOR) 20 MG tablet, Take 1 tablet (20 mg total) by mouth every evening., Disp: 90 tablet, Rfl: 0    metFORMIN (GLUCOPHAGE) 500 MG tablet, Take 1 tablet (500 mg total) by mouth 2 (two) times daily with meals., Disp: 180 tablet, Rfl: 0    metroNIDAZOLE (FLAGYL) 500 MG tablet, Take 500 mg by mouth every 12 (twelve) hours., Disp: , Rfl:     omeprazole (PRILOSEC) 20 MG capsule, Take 1 capsule (20 mg total) by mouth once daily., Disp: 90 capsule, Rfl: 0    predniSONE (DELTASONE) 20 MG tablet, Take 20 mg by mouth 2 (two) times daily., Disp: , Rfl:     pyridostigmine (MESTINON) 60 mg Tab, Take 60 mg by mouth 3 (three) times daily., Disp: , Rfl:     semaglutide (OZEMPIC) 0.25 mg or 0.5 mg(2 mg/1.5 mL) pen injector, Inject 0.5 mg into the skin every 7 days., Disp: 3 pen, Rfl: 0    topiramate (TOPAMAX) 50 MG tablet, Take 1 tablet (50 mg total) by mouth 2 (two) times daily., Disp: 180 tablet, Rfl: 0    Review of Systems   Constitutional: Positive for fatigue. Negative for appetite change, chills, fever and unexpected weight change.   Respiratory: Negative for cough and shortness of breath.    Cardiovascular: Negative for chest pain, palpitations and leg swelling.   Gastrointestinal: Negative for abdominal pain, change in bowel habit,  "constipation, diarrhea, nausea, vomiting and change in bowel habit.   Endocrine: Negative for polydipsia and polyuria.   Genitourinary: Negative for dysuria, frequency and hematuria.   Musculoskeletal: Positive for arthralgias and myalgias. Negative for gait problem.   Integumentary:  Negative for wound.   Neurological: Positive for weakness. Negative for dizziness, syncope, light-headedness and headaches.   Psychiatric/Behavioral: Negative for confusion and sleep disturbance.       Objective     /76   Pulse 77   Ht 5' 5" (1.651 m)   Wt 95.7 kg (211 lb)   BMI 35.11 kg/m²     Physical Exam   Constitutional: She is oriented to person, place, and time. No distress.   HENT:   Head: Normocephalic.   Mouth/Throat: Mucous membranes are moist.   Cardiovascular: Normal rate and regular rhythm.   Pulmonary/Chest: Effort normal and breath sounds normal. No respiratory distress. She has no wheezes. She has no rhonchi. She has no rales.   Abdominal: Soft. Bowel sounds are normal. She exhibits no distension. There is no abdominal tenderness.   Musculoskeletal:         General: Normal range of motion.      Cervical back: Neck supple.      Right lower leg: No edema.      Left lower leg: No edema.   Neurological: She is alert and oriented to person, place, and time. Gait normal.   Skin: Skin is warm and dry.   Psychiatric: Her behavior is normal. Mood normal.       Lab Results   Component Value Date    WBC 4.58 04/13/2022    HGB 13.1 04/13/2022    HCT 40.8 04/13/2022    MCV 94.9 04/13/2022     04/13/2022       CMP  Sodium   Date Value Ref Range Status   04/13/2022 139 136 - 145 mmol/L Final     Potassium   Date Value Ref Range Status   04/13/2022 4.3 3.5 - 5.1 mmol/L Final     Chloride   Date Value Ref Range Status   04/13/2022 107 98 - 107 mmol/L Final     CO2   Date Value Ref Range Status   04/13/2022 26 21 - 32 mmol/L Final     Glucose   Date Value Ref Range Status   04/13/2022 139 (H) 74 - 106 mg/dL Final "     BUN   Date Value Ref Range Status   04/13/2022 10 7 - 18 mg/dL Final     Creatinine   Date Value Ref Range Status   04/13/2022 1.03 (H) 0.55 - 1.02 mg/dL Final     Calcium   Date Value Ref Range Status   04/13/2022 9.4 8.5 - 10.1 mg/dL Final     Total Protein   Date Value Ref Range Status   04/13/2022 7.4 6.4 - 8.2 g/dL Final     Albumin   Date Value Ref Range Status   04/13/2022 3.6 3.5 - 5.0 g/dL Final     Bilirubin, Total   Date Value Ref Range Status   04/13/2022 0.4 0.0 - 1.2 mg/dL Final     Alk Phos   Date Value Ref Range Status   04/13/2022 83 39 - 100 U/L Final     AST   Date Value Ref Range Status   04/13/2022 13 (L) 15 - 37 U/L Final     ALT   Date Value Ref Range Status   04/13/2022 24 13 - 56 U/L Final     Anion Gap   Date Value Ref Range Status   04/13/2022 10 7 - 16 mmol/L Final     eGFR    Date Value Ref Range Status   08/13/2021 67 >=60 mL/min/1.73m² Final     eGFR   Date Value Ref Range Status   04/13/2022 61 >=60 mL/min/1.73m² Final     Lab Results   Component Value Date    TSH 0.766 08/13/2021     Lab Results   Component Value Date    CHOL 216 (H) 04/13/2022    CHOL 250 (H) 04/19/2021     Lab Results   Component Value Date    HDL 58 04/13/2022    HDL 54 04/19/2021     Lab Results   Component Value Date    LDLCALC 137 04/13/2022    LDLCALC 166 04/19/2021     Lab Results   Component Value Date    TRIG 106 04/13/2022    TRIG 150 04/19/2021     Lab Results   Component Value Date    CHOLHDL 3.7 04/13/2022    CHOLHDL 4.6 04/19/2021     Lab Results   Component Value Date    HGBA1C 6.8 (H) 04/13/2022         Assessment and Plan   Type 2 diabetes mellitus without complication, without long-term current use of insulin  -     metFORMIN (GLUCOPHAGE) 500 MG tablet; Take 1 tablet (500 mg total) by mouth 2 (two) times daily with meals.  Dispense: 180 tablet; Refill: 0  -     linaGLIPtin (TRADJENTA) 5 mg Tab tablet; Take 1 tablet (5 mg total) by mouth once daily.  Dispense: 90 tablet; Refill:  0  -     semaglutide (OZEMPIC) 0.25 mg or 0.5 mg(2 mg/1.5 mL) pen injector; Inject 0.5 mg into the skin every 7 days.  Dispense: 3 pen; Refill: 0    Hyperlipidemia, unspecified hyperlipidemia type  -     lovastatin (MEVACOR) 20 MG tablet; Take 1 tablet (20 mg total) by mouth every evening.  Dispense: 90 tablet; Refill: 0    Migraine without aura and without status migrainosus, not intractable  -     topiramate (TOPAMAX) 50 MG tablet; Take 1 tablet (50 mg total) by mouth 2 (two) times daily.  Dispense: 180 tablet; Refill: 0    Gastroesophageal reflux disease, unspecified whether esophagitis present  -     omeprazole (PRILOSEC) 20 MG capsule; Take 1 capsule (20 mg total) by mouth once daily.  Dispense: 90 capsule; Refill: 0          Patient Instructions  Patient Instructions   Refills on routine medications   Follow up in three months and as needed   A1c will be obtained at next office visit, due after 7/12/22

## 2022-09-07 DIAGNOSIS — E11.9 TYPE 2 DIABETES MELLITUS WITHOUT COMPLICATION, WITHOUT LONG-TERM CURRENT USE OF INSULIN: ICD-10-CM

## 2022-09-07 RX ORDER — SEMAGLUTIDE 1.34 MG/ML
0.5 INJECTION, SOLUTION SUBCUTANEOUS
Qty: 3 PEN | Refills: 0 | Status: SHIPPED | OUTPATIENT
Start: 2022-09-07 | End: 2022-09-26 | Stop reason: SDUPTHER

## 2022-09-07 NOTE — TELEPHONE ENCOUNTER
----- Message from Claire Major MA sent at 9/7/2022  8:28 AM CDT -----  Pt called requested rx refill of insulin shots because she is out. Pt has an appointment scheduled for 9/22. Please call pt for any other information

## 2022-09-26 ENCOUNTER — OFFICE VISIT (OUTPATIENT)
Dept: FAMILY MEDICINE | Facility: CLINIC | Age: 47
End: 2022-09-26
Payer: COMMERCIAL

## 2022-09-26 VITALS
BODY MASS INDEX: 33.49 KG/M2 | OXYGEN SATURATION: 98 % | TEMPERATURE: 97 F | HEIGHT: 65 IN | WEIGHT: 201 LBS | HEART RATE: 75 BPM | SYSTOLIC BLOOD PRESSURE: 112 MMHG | DIASTOLIC BLOOD PRESSURE: 58 MMHG

## 2022-09-26 DIAGNOSIS — G73.7 MYOPATHY IN DISEASES CLASSIFIED ELSEWHERE: ICD-10-CM

## 2022-09-26 DIAGNOSIS — Z72.0 SMOKELESS TOBACCO USE: ICD-10-CM

## 2022-09-26 DIAGNOSIS — K21.9 GASTROESOPHAGEAL REFLUX DISEASE, UNSPECIFIED WHETHER ESOPHAGITIS PRESENT: ICD-10-CM

## 2022-09-26 DIAGNOSIS — G43.009 MIGRAINE WITHOUT AURA AND WITHOUT STATUS MIGRAINOSUS, NOT INTRACTABLE: ICD-10-CM

## 2022-09-26 DIAGNOSIS — E78.5 HYPERLIPIDEMIA, UNSPECIFIED HYPERLIPIDEMIA TYPE: ICD-10-CM

## 2022-09-26 DIAGNOSIS — E11.9 TYPE 2 DIABETES MELLITUS WITHOUT COMPLICATION, WITHOUT LONG-TERM CURRENT USE OF INSULIN: Primary | ICD-10-CM

## 2022-09-26 DIAGNOSIS — J30.2 SEASONAL ALLERGIES: ICD-10-CM

## 2022-09-26 LAB
EST. AVERAGE GLUCOSE BLD GHB EST-MCNC: 110 MG/DL
HBA1C MFR BLD HPLC: 5.9 % (ref 4.5–6.6)

## 2022-09-26 PROCEDURE — 3061F NEG MICROALBUMINURIA REV: CPT | Mod: ,,, | Performed by: NURSE PRACTITIONER

## 2022-09-26 PROCEDURE — 3074F PR MOST RECENT SYSTOLIC BLOOD PRESSURE < 130 MM HG: ICD-10-PCS | Mod: ,,, | Performed by: NURSE PRACTITIONER

## 2022-09-26 PROCEDURE — 3066F PR DOCUMENTATION OF TREATMENT FOR NEPHROPATHY: ICD-10-PCS | Mod: ,,, | Performed by: NURSE PRACTITIONER

## 2022-09-26 PROCEDURE — 3061F PR NEG MICROALBUMINURIA RESULT DOCUMENTED/REVIEW: ICD-10-PCS | Mod: ,,, | Performed by: NURSE PRACTITIONER

## 2022-09-26 PROCEDURE — 3044F HG A1C LEVEL LT 7.0%: CPT | Mod: ,,, | Performed by: NURSE PRACTITIONER

## 2022-09-26 PROCEDURE — 99213 PR OFFICE/OUTPT VISIT, EST, LEVL III, 20-29 MIN: ICD-10-PCS | Mod: ,,, | Performed by: NURSE PRACTITIONER

## 2022-09-26 PROCEDURE — 1159F PR MEDICATION LIST DOCUMENTED IN MEDICAL RECORD: ICD-10-PCS | Mod: ,,, | Performed by: NURSE PRACTITIONER

## 2022-09-26 PROCEDURE — 3008F BODY MASS INDEX DOCD: CPT | Mod: ,,, | Performed by: NURSE PRACTITIONER

## 2022-09-26 PROCEDURE — 3008F PR BODY MASS INDEX (BMI) DOCUMENTED: ICD-10-PCS | Mod: ,,, | Performed by: NURSE PRACTITIONER

## 2022-09-26 PROCEDURE — 3066F NEPHROPATHY DOC TX: CPT | Mod: ,,, | Performed by: NURSE PRACTITIONER

## 2022-09-26 PROCEDURE — 3078F DIAST BP <80 MM HG: CPT | Mod: ,,, | Performed by: NURSE PRACTITIONER

## 2022-09-26 PROCEDURE — 3074F SYST BP LT 130 MM HG: CPT | Mod: ,,, | Performed by: NURSE PRACTITIONER

## 2022-09-26 PROCEDURE — 3044F PR MOST RECENT HEMOGLOBIN A1C LEVEL <7.0%: ICD-10-PCS | Mod: ,,, | Performed by: NURSE PRACTITIONER

## 2022-09-26 PROCEDURE — 1159F MED LIST DOCD IN RCRD: CPT | Mod: ,,, | Performed by: NURSE PRACTITIONER

## 2022-09-26 PROCEDURE — 83036 HEMOGLOBIN A1C: ICD-10-PCS | Mod: ,,, | Performed by: CLINICAL MEDICAL LABORATORY

## 2022-09-26 PROCEDURE — 1160F RVW MEDS BY RX/DR IN RCRD: CPT | Mod: ,,, | Performed by: NURSE PRACTITIONER

## 2022-09-26 PROCEDURE — 1160F PR REVIEW ALL MEDS BY PRESCRIBER/CLIN PHARMACIST DOCUMENTED: ICD-10-PCS | Mod: ,,, | Performed by: NURSE PRACTITIONER

## 2022-09-26 PROCEDURE — 99213 OFFICE O/P EST LOW 20 MIN: CPT | Mod: ,,, | Performed by: NURSE PRACTITIONER

## 2022-09-26 PROCEDURE — 3078F PR MOST RECENT DIASTOLIC BLOOD PRESSURE < 80 MM HG: ICD-10-PCS | Mod: ,,, | Performed by: NURSE PRACTITIONER

## 2022-09-26 PROCEDURE — 83036 HEMOGLOBIN GLYCOSYLATED A1C: CPT | Mod: ,,, | Performed by: CLINICAL MEDICAL LABORATORY

## 2022-09-26 RX ORDER — SEMAGLUTIDE 1.34 MG/ML
0.5 INJECTION, SOLUTION SUBCUTANEOUS
Qty: 3 PEN | Refills: 0 | Status: SHIPPED | OUTPATIENT
Start: 2022-09-26 | End: 2022-12-28 | Stop reason: SDUPTHER

## 2022-09-26 RX ORDER — TOPIRAMATE 50 MG/1
50 TABLET, FILM COATED ORAL 2 TIMES DAILY
Qty: 180 TABLET | Refills: 0 | Status: SHIPPED | OUTPATIENT
Start: 2022-09-26 | End: 2022-12-28 | Stop reason: SDUPTHER

## 2022-09-26 RX ORDER — LINAGLIPTIN 5 MG/1
5 TABLET, FILM COATED ORAL DAILY
Qty: 90 TABLET | Refills: 0 | Status: CANCELLED | OUTPATIENT
Start: 2022-09-26 | End: 2022-12-25

## 2022-09-26 RX ORDER — OMEPRAZOLE 20 MG/1
20 CAPSULE, DELAYED RELEASE ORAL DAILY
Qty: 90 CAPSULE | Refills: 0 | Status: SHIPPED | OUTPATIENT
Start: 2022-09-26 | End: 2022-12-28 | Stop reason: SDUPTHER

## 2022-09-26 RX ORDER — LOVASTATIN 20 MG/1
20 TABLET ORAL NIGHTLY
Qty: 90 TABLET | Refills: 0 | Status: SHIPPED | OUTPATIENT
Start: 2022-09-26 | End: 2022-12-28 | Stop reason: SDUPTHER

## 2022-09-26 RX ORDER — FLUTICASONE PROPIONATE 50 MCG
1 SPRAY, SUSPENSION (ML) NASAL DAILY
Qty: 16 G | Refills: 2 | Status: SHIPPED | OUTPATIENT
Start: 2022-09-26 | End: 2023-03-16 | Stop reason: SDUPTHER

## 2022-09-26 RX ORDER — LOVASTATIN 20 MG/1
20 TABLET ORAL NIGHTLY
Qty: 90 TABLET | Refills: 0 | Status: CANCELLED | OUTPATIENT
Start: 2022-09-26

## 2022-09-26 RX ORDER — METFORMIN HYDROCHLORIDE 500 MG/1
500 TABLET ORAL 2 TIMES DAILY WITH MEALS
Qty: 180 TABLET | Refills: 0 | Status: SHIPPED | OUTPATIENT
Start: 2022-09-26 | End: 2022-12-28 | Stop reason: SDUPTHER

## 2022-09-26 NOTE — PATIENT INSTRUCTIONS
Lab obtained in clinic today, we will notify you of results and any necessary changes to plan of care   Refills on routine medications   Follow up in three months and as needed

## 2022-09-26 NOTE — PROGRESS NOTES
Clinic note     Patient name: Tequila Vázquez is a 47 y.o. female   Chief compliant   Chief Complaint   Patient presents with    Medication Refill    Follow-up     Voiced no problem at this time.        Subjective     History of present illness   In clinic for routine follow up and medication refills   Hx of right eyelid ptosis and weakness of RUE and RLE, myasthenia gravis; recently seen by Monique FNP neurology and Dr Desir neurology [facility]  Hx of DM, checking blood glucose daily, she states readings have been 95-98 fasting; does not have blood glucose log in clinic for review today  She states she has been making better diet choices and smaller portions; has stopped soda and is trying to walk for exercise on most days, she has lost 10 # since office visit in June   Last a1c was 6.8, this will be rechecked today   Tolerating all medications without problems   Past Medical History: asthma, DM type 2, latent syphilis, myasthenia gravis, hyperlipidemia      Social History     Tobacco Use    Smoking status: Never    Smokeless tobacco: Current     Types: Snuff   Substance Use Topics    Alcohol use: Never    Drug use: Never       Review of patient's allergies indicates:   Allergen Reactions    Aspirin Hives       Past Medical History:   Diagnosis Date    Asthma     Diabetes mellitus     Late syphilis, 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 Added automatically from request for surgery [id number]       Past Surgical History:   Procedure Laterality Date    DILATION AND CURETTAGE OF UTERUS  2000    preformed at [facility]    HYSTERECTOMY      OOPHORECTOMY      pt has had one ovary removed, unsure side    TUBAL LIGATION  1999     [facility]        Family History   Problem Relation Age of Onset    Asthma Mother     Coronary artery disease Mother     Diabetes Father     Heart disease Father     Cancer Sister         Breast     Breast cancer Sister          Current Outpatient Medications:     albuterol (PROVENTIL/VENTOLIN HFA) 90 mcg/actuation inhaler, Inhale 2 puffs into the lungs every 6 (six) hours as needed for Wheezing. Rescue, Disp: 18 g, Rfl: 2    gabapentin (NEURONTIN) 100 MG capsule, Take 1 capsule (100 mg total) by mouth 3 (three) times daily., Disp: 90 capsule, Rfl: 11    pyridostigmine (MESTINON) 60 mg Tab, Take 60 mg by mouth 3 (three) times daily., Disp: , Rfl:     fluticasone propionate (FLONASE) 50 mcg/actuation nasal spray, 1 spray (50 mcg total) by Each Nostril route once daily., Disp: 16 g, Rfl: 2    lancets (E-Z JECT LANCETS) 32 gauge Misc, 100 lancets by Misc.(Non-Drug; Combo Route) route. USE TO CHECK GLUCOSE ONCE DAILY, Disp: , Rfl:     lovastatin (MEVACOR) 20 MG tablet, Take 1 tablet (20 mg total) by mouth every evening., Disp: 90 tablet, Rfl: 0    metFORMIN (GLUCOPHAGE) 500 MG tablet, Take 1 tablet (500 mg total) by mouth 2 (two) times daily with meals., Disp: 180 tablet, Rfl: 0    metroNIDAZOLE (FLAGYL) 500 MG tablet, Take 500 mg by mouth every 12 (twelve) hours., Disp: , Rfl:     omeprazole (PRILOSEC) 20 MG capsule, Take 1 capsule (20 mg total) by mouth once daily., Disp: 90 capsule, Rfl: 0    predniSONE (DELTASONE) 20 MG tablet, Take 20 mg by mouth 2 (two) times daily., Disp: , Rfl:     semaglutide (OZEMPIC) 0.25 mg or 0.5 mg(2 mg/1.5 mL) pen injector, Inject 0.5 mg into the skin every 7 days., Disp: 3 pen, Rfl: 0    topiramate (TOPAMAX) 50 MG tablet, Take 1 tablet (50 mg total) by mouth 2 (two) times daily., Disp: 180 tablet, Rfl: 0    Review of Systems   Constitutional:  Negative for appetite change, chills, fatigue, fever and unexpected weight change.   Eyes:  Negative for visual disturbance.   Respiratory:  Negative for cough and shortness of breath.    Cardiovascular:  Negative for chest pain, palpitations and leg swelling.   Gastrointestinal:  Negative for abdominal pain, change in bowel habit,  "constipation, diarrhea, nausea, vomiting and change in bowel habit.   Endocrine: Negative for polydipsia and polyuria.   Genitourinary:  Negative for dysuria.   Musculoskeletal:  Negative for arthralgias, gait problem and myalgias.   Integumentary:  Negative for wound.   Neurological:  Negative for dizziness, syncope, light-headedness and headaches.   Psychiatric/Behavioral:  Negative for confusion, dysphoric mood and sleep disturbance. The patient is not nervous/anxious.      Objective     BP (!) 112/58 (BP Method: Large (Manual))   Pulse 75   Temp 97.4 °F (36.3 °C)   Ht 5' 5" (1.651 m)   Wt 91.2 kg (201 lb)   SpO2 98%   BMI 33.45 kg/m²     Physical Exam   Constitutional: She is oriented to person, place, and time. No distress.   HENT:   Head: Atraumatic.   Mouth/Throat: Mucous membranes are moist.   Eyes:   Right eye ptosis   Cardiovascular: Normal rate and regular rhythm. Pulmonary:      Effort: Pulmonary effort is normal. No respiratory distress.      Breath sounds: Normal breath sounds. No wheezing, rhonchi or rales.     Abdominal: Soft. Bowel sounds are normal. She exhibits no distension. There is no abdominal tenderness.   Musculoskeletal:         General: Normal range of motion.      Cervical back: Neck supple.      Right lower leg: No edema.      Left lower leg: No edema.   Neurological: She is alert and oriented to person, place, and time. Gait normal.   Skin: Skin is warm and dry.   Psychiatric: Her behavior is normal. Mood normal.     Lab Results   Component Value Date    WBC 4.58 04/13/2022    HGB 13.1 04/13/2022    HCT 40.8 04/13/2022    MCV 94.9 04/13/2022     04/13/2022       CMP  Sodium   Date Value Ref Range Status   04/13/2022 139 136 - 145 mmol/L Final     Potassium   Date Value Ref Range Status   04/13/2022 4.3 3.5 - 5.1 mmol/L Final     Chloride   Date Value Ref Range Status   04/13/2022 107 98 - 107 mmol/L Final     CO2   Date Value Ref Range Status   04/13/2022 26 21 - 32 mmol/L " Final     Glucose   Date Value Ref Range Status   04/13/2022 139 (H) 74 - 106 mg/dL Final     BUN   Date Value Ref Range Status   04/13/2022 10 7 - 18 mg/dL Final     Creatinine   Date Value Ref Range Status   04/13/2022 1.03 (H) 0.55 - 1.02 mg/dL Final     Calcium   Date Value Ref Range Status   04/13/2022 9.4 8.5 - 10.1 mg/dL Final     Total Protein   Date Value Ref Range Status   04/13/2022 7.4 6.4 - 8.2 g/dL Final     Albumin   Date Value Ref Range Status   04/13/2022 3.6 3.5 - 5.0 g/dL Final     Bilirubin, Total   Date Value Ref Range Status   04/13/2022 0.4 0.0 - 1.2 mg/dL Final     Alk Phos   Date Value Ref Range Status   04/13/2022 83 39 - 100 U/L Final     AST   Date Value Ref Range Status   04/13/2022 13 (L) 15 - 37 U/L Final     ALT   Date Value Ref Range Status   04/13/2022 24 13 - 56 U/L Final     Anion Gap   Date Value Ref Range Status   04/13/2022 10 7 - 16 mmol/L Final     eGFR    Date Value Ref Range Status   08/13/2021 67 >=60 mL/min/1.73m² Final     eGFR   Date Value Ref Range Status   04/13/2022 61 >=60 mL/min/1.73m² Final     Lab Results   Component Value Date    TSH 0.766 08/13/2021     Lab Results   Component Value Date    CHOL 216 (H) 04/13/2022    CHOL 250 (H) 04/19/2021     Lab Results   Component Value Date    HDL 58 04/13/2022    HDL 54 04/19/2021     Lab Results   Component Value Date    LDLCALC 137 04/13/2022    LDLCALC 166 04/19/2021     Lab Results   Component Value Date    TRIG 106 04/13/2022    TRIG 150 04/19/2021     Lab Results   Component Value Date    CHOLHDL 3.7 04/13/2022    CHOLHDL 4.6 04/19/2021     Lab Results   Component Value Date    HGBA1C 6.8 (H) 04/13/2022         Assessment and Plan   Type 2 diabetes mellitus without complication, without long-term current use of insulin  -     metFORMIN (GLUCOPHAGE) 500 MG tablet; Take 1 tablet (500 mg total) by mouth 2 (two) times daily with meals.  Dispense: 180 tablet; Refill: 0  -     Hemoglobin A1C; Future;  Expected date: 09/26/2022  -     semaglutide (OZEMPIC) 0.25 mg or 0.5 mg(2 mg/1.5 mL) pen injector; Inject 0.5 mg into the skin every 7 days.  Dispense: 3 pen; Refill: 0    Migraine without aura and without status migrainosus, not intractable  -     topiramate (TOPAMAX) 50 MG tablet; Take 1 tablet (50 mg total) by mouth 2 (two) times daily.  Dispense: 180 tablet; Refill: 0    Gastroesophageal reflux disease, unspecified whether esophagitis present  -     omeprazole (PRILOSEC) 20 MG capsule; Take 1 capsule (20 mg total) by mouth once daily.  Dispense: 90 capsule; Refill: 0    Hyperlipidemia, unspecified hyperlipidemia type  -     lovastatin (MEVACOR) 20 MG tablet; Take 1 tablet (20 mg total) by mouth every evening.  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  Dispense: 16 g; Refill: 2        Patient Instructions  Patient Instructions   Lab obtained in clinic today, we will notify you of results and any necessary changes to plan of care   Refills on routine medications   Follow up in three months and as needed

## 2022-11-09 DIAGNOSIS — Z71.89 COMPLEX CARE COORDINATION: ICD-10-CM

## 2022-11-16 ENCOUNTER — CLINICAL SUPPORT (OUTPATIENT)
Dept: FAMILY MEDICINE | Facility: CLINIC | Age: 47
End: 2022-11-16
Payer: COMMERCIAL

## 2022-11-16 DIAGNOSIS — Z23 IMMUNIZATION DUE: Primary | ICD-10-CM

## 2022-11-16 DIAGNOSIS — E11.9 TYPE 2 DIABETES MELLITUS WITHOUT COMPLICATION, WITHOUT LONG-TERM CURRENT USE OF INSULIN: ICD-10-CM

## 2022-11-16 PROCEDURE — 90686 IIV4 VACC NO PRSV 0.5 ML IM: CPT | Mod: ,,, | Performed by: FAMILY MEDICINE

## 2022-11-16 PROCEDURE — G0008 FLU VACCINE (QUAD) GREATER THAN OR EQUAL TO 3YO PRESERVATIVE FREE IM: ICD-10-PCS | Mod: ,,, | Performed by: FAMILY MEDICINE

## 2022-11-16 PROCEDURE — G0008 ADMIN INFLUENZA VIRUS VAC: HCPCS | Mod: ,,, | Performed by: FAMILY MEDICINE

## 2022-11-16 PROCEDURE — 90686 FLU VACCINE (QUAD) GREATER THAN OR EQUAL TO 3YO PRESERVATIVE FREE IM: ICD-10-PCS | Mod: ,,, | Performed by: FAMILY MEDICINE

## 2022-11-17 RX ORDER — LINAGLIPTIN 5 MG/1
5 TABLET, FILM COATED ORAL DAILY
Qty: 90 TABLET | Refills: 0 | OUTPATIENT
Start: 2022-11-17 | End: 2023-02-15

## 2022-11-17 NOTE — TELEPHONE ENCOUNTER
Tradjenta was d/c'd due to her also taking ozempic, taking both dpp4 class and a glp1 class increases the risk of pancreatitis

## 2022-12-28 ENCOUNTER — OFFICE VISIT (OUTPATIENT)
Dept: FAMILY MEDICINE | Facility: CLINIC | Age: 47
End: 2022-12-28
Payer: COMMERCIAL

## 2022-12-28 ENCOUNTER — LAB VISIT (OUTPATIENT)
Dept: LAB | Facility: HOSPITAL | Age: 47
End: 2022-12-28
Attending: NURSE PRACTITIONER
Payer: COMMERCIAL

## 2022-12-28 VITALS
HEART RATE: 63 BPM | WEIGHT: 207 LBS | DIASTOLIC BLOOD PRESSURE: 81 MMHG | OXYGEN SATURATION: 99 % | BODY MASS INDEX: 34.49 KG/M2 | HEIGHT: 65 IN | SYSTOLIC BLOOD PRESSURE: 124 MMHG

## 2022-12-28 DIAGNOSIS — H02.401 PTOSIS OF EYELID, RIGHT: ICD-10-CM

## 2022-12-28 DIAGNOSIS — Z72.0 SMOKELESS TOBACCO USE: ICD-10-CM

## 2022-12-28 DIAGNOSIS — G72.9 MYOPATHY: ICD-10-CM

## 2022-12-28 DIAGNOSIS — E78.5 HYPERLIPIDEMIA, UNSPECIFIED HYPERLIPIDEMIA TYPE: ICD-10-CM

## 2022-12-28 DIAGNOSIS — I10 HYPERTENSION, UNSPECIFIED TYPE: ICD-10-CM

## 2022-12-28 DIAGNOSIS — E11.9 TYPE 2 DIABETES MELLITUS WITHOUT COMPLICATION, WITHOUT LONG-TERM CURRENT USE OF INSULIN: ICD-10-CM

## 2022-12-28 DIAGNOSIS — G43.009 MIGRAINE WITHOUT AURA AND WITHOUT STATUS MIGRAINOSUS, NOT INTRACTABLE: ICD-10-CM

## 2022-12-28 DIAGNOSIS — K21.9 GASTROESOPHAGEAL REFLUX DISEASE, UNSPECIFIED WHETHER ESOPHAGITIS PRESENT: ICD-10-CM

## 2022-12-28 DIAGNOSIS — E11.9 TYPE 2 DIABETES MELLITUS WITHOUT COMPLICATION, WITHOUT LONG-TERM CURRENT USE OF INSULIN: Primary | ICD-10-CM

## 2022-12-28 LAB
ALBUMIN SERPL BCP-MCNC: 3.7 G/DL (ref 3.5–5)
ALBUMIN/GLOB SERPL: 1.1 {RATIO}
ALP SERPL-CCNC: 105 U/L (ref 39–100)
ALT SERPL W P-5'-P-CCNC: 24 U/L (ref 13–56)
ANION GAP SERPL CALCULATED.3IONS-SCNC: 11 MMOL/L (ref 7–16)
AST SERPL W P-5'-P-CCNC: 17 U/L (ref 15–37)
BASOPHILS # BLD AUTO: 0.03 K/UL (ref 0–0.2)
BASOPHILS NFR BLD AUTO: 0.6 % (ref 0–1)
BILIRUB SERPL-MCNC: 0.4 MG/DL (ref ?–1.2)
BUN SERPL-MCNC: 8 MG/DL (ref 7–18)
BUN/CREAT SERPL: 10 (ref 6–20)
CALCIUM SERPL-MCNC: 8 MG/DL (ref 8.5–10.1)
CHLORIDE SERPL-SCNC: 107 MMOL/L (ref 98–107)
CO2 SERPL-SCNC: 30 MMOL/L (ref 21–32)
CREAT SERPL-MCNC: 0.84 MG/DL (ref 0.55–1.02)
DIFFERENTIAL METHOD BLD: ABNORMAL
EGFR (NO RACE VARIABLE) (RUSH/TITUS): 85 ML/MIN/1.73M²
EOSINOPHIL # BLD AUTO: 0.08 K/UL (ref 0–0.5)
EOSINOPHIL NFR BLD AUTO: 1.7 % (ref 1–4)
ERYTHROCYTE [DISTWIDTH] IN BLOOD BY AUTOMATED COUNT: 12.6 % (ref 11.5–14.5)
EST. AVERAGE GLUCOSE BLD GHB EST-MCNC: 127 MG/DL
GLOBULIN SER-MCNC: 3.4 G/DL (ref 2–4)
GLUCOSE SERPL-MCNC: 117 MG/DL (ref 74–106)
HBA1C MFR BLD HPLC: 6.4 % (ref 4.5–6.6)
HCT VFR BLD AUTO: 38.1 % (ref 38–47)
HGB BLD-MCNC: 13.1 G/DL (ref 12–16)
LYMPHOCYTES # BLD AUTO: 1.55 K/UL (ref 1–4.8)
LYMPHOCYTES NFR BLD AUTO: 33.3 % (ref 27–41)
MCH RBC QN AUTO: 32 PG (ref 27–31)
MCHC RBC AUTO-ENTMCNC: 34.4 G/DL (ref 32–36)
MCV RBC AUTO: 92.9 FL (ref 80–96)
MONOCYTES # BLD AUTO: 0.52 K/UL (ref 0–0.8)
MONOCYTES NFR BLD AUTO: 11.2 % (ref 2–6)
MPC BLD CALC-MCNC: 11.4 FL (ref 9.4–12.4)
NEUTROPHILS # BLD AUTO: 2.48 K/UL (ref 1.8–7.7)
NEUTROPHILS NFR BLD AUTO: 53.2 % (ref 53–65)
PLATELET # BLD AUTO: 223 K/UL (ref 150–400)
PLATELET MORPHOLOGY: ABNORMAL
POTASSIUM SERPL-SCNC: 4.3 MMOL/L (ref 3.5–5.1)
PROT SERPL-MCNC: 7.1 G/DL (ref 6.4–8.2)
RBC # BLD AUTO: 4.1 M/UL (ref 4.2–5.4)
RBC MORPH BLD: NORMAL
SODIUM SERPL-SCNC: 144 MMOL/L (ref 136–145)
WBC # BLD AUTO: 4.66 K/UL (ref 4.5–11)

## 2022-12-28 PROCEDURE — 3066F NEPHROPATHY DOC TX: CPT | Mod: ,,, | Performed by: NURSE PRACTITIONER

## 2022-12-28 PROCEDURE — 3008F BODY MASS INDEX DOCD: CPT | Mod: ,,, | Performed by: NURSE PRACTITIONER

## 2022-12-28 PROCEDURE — 99214 PR OFFICE/OUTPT VISIT, EST, LEVL IV, 30-39 MIN: ICD-10-PCS | Mod: ,,, | Performed by: NURSE PRACTITIONER

## 2022-12-28 PROCEDURE — 83036 HEMOGLOBIN GLYCOSYLATED A1C: CPT

## 2022-12-28 PROCEDURE — 3066F PR DOCUMENTATION OF TREATMENT FOR NEPHROPATHY: ICD-10-PCS | Mod: ,,, | Performed by: NURSE PRACTITIONER

## 2022-12-28 PROCEDURE — 3079F DIAST BP 80-89 MM HG: CPT | Mod: ,,, | Performed by: NURSE PRACTITIONER

## 2022-12-28 PROCEDURE — 3061F NEG MICROALBUMINURIA REV: CPT | Mod: ,,, | Performed by: NURSE PRACTITIONER

## 2022-12-28 PROCEDURE — 1159F PR MEDICATION LIST DOCUMENTED IN MEDICAL RECORD: ICD-10-PCS | Mod: ,,, | Performed by: NURSE PRACTITIONER

## 2022-12-28 PROCEDURE — 99214 OFFICE O/P EST MOD 30 MIN: CPT | Mod: ,,, | Performed by: NURSE PRACTITIONER

## 2022-12-28 PROCEDURE — 36415 COLL VENOUS BLD VENIPUNCTURE: CPT

## 2022-12-28 PROCEDURE — 3061F PR NEG MICROALBUMINURIA RESULT DOCUMENTED/REVIEW: ICD-10-PCS | Mod: ,,, | Performed by: NURSE PRACTITIONER

## 2022-12-28 PROCEDURE — 3074F PR MOST RECENT SYSTOLIC BLOOD PRESSURE < 130 MM HG: ICD-10-PCS | Mod: ,,, | Performed by: NURSE PRACTITIONER

## 2022-12-28 PROCEDURE — 3044F HG A1C LEVEL LT 7.0%: CPT | Mod: ,,, | Performed by: NURSE PRACTITIONER

## 2022-12-28 PROCEDURE — 3008F PR BODY MASS INDEX (BMI) DOCUMENTED: ICD-10-PCS | Mod: ,,, | Performed by: NURSE PRACTITIONER

## 2022-12-28 PROCEDURE — 3044F PR MOST RECENT HEMOGLOBIN A1C LEVEL <7.0%: ICD-10-PCS | Mod: ,,, | Performed by: NURSE PRACTITIONER

## 2022-12-28 PROCEDURE — 3074F SYST BP LT 130 MM HG: CPT | Mod: ,,, | Performed by: NURSE PRACTITIONER

## 2022-12-28 PROCEDURE — 80053 COMPREHEN METABOLIC PANEL: CPT

## 2022-12-28 PROCEDURE — 1160F PR REVIEW ALL MEDS BY PRESCRIBER/CLIN PHARMACIST DOCUMENTED: ICD-10-PCS | Mod: ,,, | Performed by: NURSE PRACTITIONER

## 2022-12-28 PROCEDURE — 1159F MED LIST DOCD IN RCRD: CPT | Mod: ,,, | Performed by: NURSE PRACTITIONER

## 2022-12-28 PROCEDURE — 3079F PR MOST RECENT DIASTOLIC BLOOD PRESSURE 80-89 MM HG: ICD-10-PCS | Mod: ,,, | Performed by: NURSE PRACTITIONER

## 2022-12-28 PROCEDURE — 85025 COMPLETE CBC W/AUTO DIFF WBC: CPT

## 2022-12-28 PROCEDURE — 1160F RVW MEDS BY RX/DR IN RCRD: CPT | Mod: ,,, | Performed by: NURSE PRACTITIONER

## 2022-12-28 RX ORDER — PREGABALIN 25 MG/1
25 CAPSULE ORAL 3 TIMES DAILY
COMMUNITY
Start: 2022-12-02 | End: 2023-10-11 | Stop reason: ALTCHOICE

## 2022-12-28 RX ORDER — METFORMIN HYDROCHLORIDE 500 MG/1
500 TABLET ORAL 2 TIMES DAILY WITH MEALS
Qty: 180 TABLET | Refills: 0 | Status: SHIPPED | OUTPATIENT
Start: 2022-12-28 | End: 2023-03-16 | Stop reason: SDUPTHER

## 2022-12-28 RX ORDER — LOVASTATIN 20 MG/1
20 TABLET ORAL NIGHTLY
Qty: 90 TABLET | Refills: 0 | Status: SHIPPED | OUTPATIENT
Start: 2022-12-28 | End: 2023-02-27 | Stop reason: ALTCHOICE

## 2022-12-28 RX ORDER — TOPIRAMATE 50 MG/1
50 TABLET, FILM COATED ORAL 2 TIMES DAILY
Qty: 180 TABLET | Refills: 0 | Status: SHIPPED | OUTPATIENT
Start: 2022-12-28 | End: 2023-06-12

## 2022-12-28 RX ORDER — PREDNISONE 10 MG/1
1.5 TABLET ORAL DAILY
COMMUNITY
Start: 2022-12-02 | End: 2024-03-25 | Stop reason: ALTCHOICE

## 2022-12-28 RX ORDER — SEMAGLUTIDE 1.34 MG/ML
0.5 INJECTION, SOLUTION SUBCUTANEOUS
COMMUNITY
End: 2023-03-16 | Stop reason: SDUPTHER

## 2022-12-28 RX ORDER — LINAGLIPTIN 5 MG/1
5 TABLET, FILM COATED ORAL
COMMUNITY
End: 2022-12-28

## 2022-12-28 RX ORDER — OMEPRAZOLE 20 MG/1
20 CAPSULE, DELAYED RELEASE ORAL DAILY
Qty: 90 CAPSULE | Refills: 0 | Status: SHIPPED | OUTPATIENT
Start: 2022-12-28 | End: 2023-03-16 | Stop reason: SDUPTHER

## 2022-12-28 RX ORDER — SEMAGLUTIDE 1.34 MG/ML
0.5 INJECTION, SOLUTION SUBCUTANEOUS
Qty: 3 PEN | Refills: 0 | Status: SHIPPED | OUTPATIENT
Start: 2022-12-28 | End: 2023-03-16 | Stop reason: SDUPTHER

## 2022-12-28 NOTE — PATIENT INSTRUCTIONS
Lab ordered in clinic today, we will notify you of results and any necessary changes to plan of care, no lab available in clinic today, you can come back next week for these to be obtained or you can go over to hospital today  Refills provided on routine medications   Follow up in three months and as needed

## 2022-12-28 NOTE — PROGRESS NOTES
Clinic note     Patient name: Tequila Vázquez is a 47 y.o. female   Chief compliant   Chief Complaint   Patient presents with    Medication Refill    Follow-up     Dr in [place] did make some med changes. Also not taking the trajenta       Subjective     History of present illness   In clinic for routine follow up and medication refills   Hx of right eyelid ptosis and weakness of RUE and RLE, myasthenia gravis; recently seen by Monique [facility] neurology and Dr Desir neurology [facility]  Medication changes were made during last office visit in [place], Neurontin was stopped and Lyrica was started  Hx of DM, checking blood glucose daily, she states readings have been 95-98 fasting; does not have blood glucose log in clinic for review today  She states she has been making better diet choices and smaller portions; has stopped soda and is trying to walk for exercise on most days, she states she has purchased a bicycle and plans to start riding as weather permits; she has gained 6 # since office visit in Sept, she is on prednisone and has changed her eating habits over the holiday season, encouraged to get back to making better diet choices  Last a1c was 5.9, this will be rechecked today   Tolerating all medications without problems   Past Medical History: asthma, DM type 2, latent syphilis, myasthenia gravis, hyperlipidemia         Social History     Tobacco Use    Smoking status: Never    Smokeless tobacco: Current     Types: Snuff   Substance Use Topics    Alcohol use: Never    Drug use: Never       Review of patient's allergies indicates:   Allergen Reactions    Aspirin Hives       Past Medical History:   Diagnosis Date    Asthma     Diabetes mellitus     Late syphilis, 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 Added automatically from request for surgery [id number]       Past Surgical History:   Procedure Laterality Date     DILATION AND CURETTAGE OF UTERUS  2000    preformed at [facility]    HYSTERECTOMY      OOPHORECTOMY      pt has had one ovary removed, unsure side    TUBAL LIGATION  1999    [facility]        Family History   Problem Relation Age of Onset    Asthma Mother     Coronary artery disease Mother     Diabetes Father     Heart disease Father     Cancer Sister         Breast    Breast cancer Sister          Current Outpatient Medications:     albuterol (PROVENTIL/VENTOLIN HFA) 90 mcg/actuation inhaler, Inhale 2 puffs into the lungs every 6 (six) hours as needed for Wheezing. Rescue, Disp: 18 g, Rfl: 2    fluticasone propionate (FLONASE) 50 mcg/actuation nasal spray, 1 spray (50 mcg total) by Each Nostril route once daily., Disp: 16 g, Rfl: 2    lancets (E-Z JECT LANCETS) 32 gauge Misc, 100 lancets by Misc.(Non-Drug; Combo Route) route. USE TO CHECK GLUCOSE ONCE DAILY, Disp: , Rfl:     predniSONE (DELTASONE) 10 MG tablet, Take 1.5 tablets by mouth Daily., Disp: , Rfl:     pregabalin (LYRICA) 25 MG capsule, Take 25 mg by mouth 3 (three) times daily., Disp: , Rfl:     pyridostigmine (MESTINON) 60 mg Tab, Take 60 mg by mouth 3 (three) times daily., Disp: , Rfl:     semaglutide (OZEMPIC) 0.25 mg or 0.5 mg(2 mg/1.5 mL) pen injector, Inject 0.5 mg into the skin every 7 days., Disp: , Rfl:     lovastatin (MEVACOR) 20 MG tablet, Take 1 tablet (20 mg total) by mouth every evening., Disp: 90 tablet, Rfl: 0    metFORMIN (GLUCOPHAGE) 500 MG tablet, Take 1 tablet (500 mg total) by mouth 2 (two) times daily with meals., Disp: 180 tablet, Rfl: 0    metroNIDAZOLE (FLAGYL) 500 MG tablet, Take 500 mg by mouth every 12 (twelve) hours., Disp: , Rfl:     omeprazole (PRILOSEC) 20 MG capsule, Take 1 capsule (20 mg total) by mouth once daily., Disp: 90 capsule, Rfl: 0    predniSONE (DELTASONE) 20 MG tablet, Take 20 mg by mouth 2 (two) times daily., Disp: , Rfl:     semaglutide (OZEMPIC) 0.25 mg or 0.5 mg(2 mg/1.5 mL) pen injector, Inject 0.5 mg  "into the skin every 7 days., Disp: 3 pen, Rfl: 0    topiramate (TOPAMAX) 50 MG tablet, Take 1 tablet (50 mg total) by mouth 2 (two) times daily., Disp: 180 tablet, Rfl: 0    Review of Systems   Constitutional:  Negative for appetite change, chills, fatigue, fever and unexpected weight change.   Respiratory:  Negative for cough and shortness of breath.    Cardiovascular:  Negative for chest pain, palpitations and leg swelling.   Gastrointestinal:  Negative for abdominal pain, change in bowel habit, constipation, diarrhea, nausea, vomiting and change in bowel habit.   Endocrine: Negative for polydipsia.   Genitourinary:  Negative for dysuria.   Musculoskeletal:  Negative for arthralgias, gait problem and myalgias.   Integumentary:  Negative for wound.   Neurological:  Negative for dizziness, syncope, light-headedness and headaches.   Psychiatric/Behavioral:  Negative for confusion, dysphoric mood and sleep disturbance. The patient is not nervous/anxious.      Objective     /81   Pulse 63   Ht 5' 5" (1.651 m)   Wt 93.9 kg (207 lb)   SpO2 99%   BMI 34.45 kg/m²     Physical Exam   Constitutional: She is oriented to person, place, and time. No distress.   HENT:   Head: Normocephalic.   Mouth/Throat: Mucous membranes are moist.   Eyes: Pupils are equal, round, and reactive to light. Conjunctivae are normal.   Right eye ptosis    Cardiovascular: Normal rate and regular rhythm. Pulmonary:      Effort: Pulmonary effort is normal. No respiratory distress.      Breath sounds: Normal breath sounds. No wheezing, rhonchi or rales.     Abdominal: Soft. Bowel sounds are normal. She exhibits no distension. There is no abdominal tenderness.   Musculoskeletal:         General: Normal range of motion.      Cervical back: Neck supple.      Right lower leg: No edema.      Left lower leg: No edema.   Neurological: She is alert and oriented to person, place, and time. Gait normal.   Skin: Skin is warm and dry.   Psychiatric: Her " behavior is normal. Mood normal.     Lab Results   Component Value Date    WBC 4.66 12/28/2022    HGB 13.1 12/28/2022    HCT 38.1 12/28/2022    MCV 92.9 12/28/2022     12/28/2022       CMP  Sodium   Date Value Ref Range Status   12/28/2022 144 136 - 145 mmol/L Final     Potassium   Date Value Ref Range Status   12/28/2022 4.3 3.5 - 5.1 mmol/L Final     Chloride   Date Value Ref Range Status   12/28/2022 107 98 - 107 mmol/L Final     CO2   Date Value Ref Range Status   12/28/2022 30 21 - 32 mmol/L Final     Glucose   Date Value Ref Range Status   12/28/2022 117 (H) 74 - 106 mg/dL Final     BUN   Date Value Ref Range Status   12/28/2022 8 7 - 18 mg/dL Final     Creatinine   Date Value Ref Range Status   12/28/2022 0.84 0.55 - 1.02 mg/dL Final     Calcium   Date Value Ref Range Status   12/28/2022 8.0 (L) 8.5 - 10.1 mg/dL Final     Total Protein   Date Value Ref Range Status   12/28/2022 7.1 6.4 - 8.2 g/dL Final     Albumin   Date Value Ref Range Status   12/28/2022 3.7 3.5 - 5.0 g/dL Final     Bilirubin, Total   Date Value Ref Range Status   12/28/2022 0.4 >0.0 - 1.2 mg/dL Final     Alk Phos   Date Value Ref Range Status   12/28/2022 105 (H) 39 - 100 U/L Final     AST   Date Value Ref Range Status   12/28/2022 17 15 - 37 U/L Final     ALT   Date Value Ref Range Status   12/28/2022 24 13 - 56 U/L Final     Anion Gap   Date Value Ref Range Status   12/28/2022 11 7 - 16 mmol/L Final     eGFR    Date Value Ref Range Status   08/13/2021 67 >=60 mL/min/1.73m² Final     eGFR   Date Value Ref Range Status   04/13/2022 61 >=60 mL/min/1.73m² Final     Lab Results   Component Value Date    TSH 0.766 08/13/2021     Lab Results   Component Value Date    CHOL 216 (H) 04/13/2022    CHOL 250 (H) 04/19/2021     Lab Results   Component Value Date    HDL 58 04/13/2022    HDL 54 04/19/2021     Lab Results   Component Value Date    LDLCALC 137 04/13/2022    LDLCALC 166 04/19/2021     Lab Results   Component Value  Date    TRIG 106 04/13/2022    TRIG 150 04/19/2021     Lab Results   Component Value Date    CHOLHDL 3.7 04/13/2022    CHOLHDL 4.6 04/19/2021     Lab Results   Component Value Date    HGBA1C 5.9 09/26/2022         Assessment and Plan   Type 2 diabetes mellitus without complication, without long-term current use of insulin  -     metFORMIN (GLUCOPHAGE) 500 MG tablet; Take 1 tablet (500 mg total) by mouth 2 (two) times daily with meals.  Dispense: 180 tablet; Refill: 0  -     semaglutide (OZEMPIC) 0.25 mg or 0.5 mg(2 mg/1.5 mL) pen injector; Inject 0.5 mg into the skin every 7 days.  Dispense: 3 pen; Refill: 0  -     Hemoglobin A1C; Future; Expected date: 12/28/2022    Hyperlipidemia, unspecified hyperlipidemia type  -     lovastatin (MEVACOR) 20 MG tablet; Take 1 tablet (20 mg total) by mouth every evening.  Dispense: 90 tablet; Refill: 0    Gastroesophageal reflux disease, unspecified whether esophagitis present  -     omeprazole (PRILOSEC) 20 MG capsule; Take 1 capsule (20 mg total) by mouth once daily.  Dispense: 90 capsule; Refill: 0    Migraine without aura and without status migrainosus, not intractable  -     topiramate (TOPAMAX) 50 MG tablet; Take 1 tablet (50 mg total) by mouth 2 (two) times daily.  Dispense: 180 tablet; Refill: 0    Ptosis of eyelid, right    Hypertension, unspecified type  -     CBC Auto Differential; Future; Expected date: 12/28/2022  -     Comprehensive Metabolic Panel; Future; Expected date: 12/28/2022    Myopathy    Smokeless tobacco use          Patient Instructions  Patient Instructions   Lab ordered in clinic today, we will notify you of results and any necessary changes to plan of care, no lab available in clinic today, you can come back next week for these to be obtained or you can go over to hospital today  Refills provided on routine medications   Follow up in three months and as needed

## 2023-02-27 ENCOUNTER — OFFICE VISIT (OUTPATIENT)
Dept: OBSTETRICS AND GYNECOLOGY | Facility: CLINIC | Age: 48
End: 2023-02-27
Payer: COMMERCIAL

## 2023-02-27 VITALS
RESPIRATION RATE: 17 BRPM | OXYGEN SATURATION: 99 % | TEMPERATURE: 99 F | BODY MASS INDEX: 35.55 KG/M2 | HEART RATE: 105 BPM | HEIGHT: 65 IN | DIASTOLIC BLOOD PRESSURE: 80 MMHG | SYSTOLIC BLOOD PRESSURE: 140 MMHG | WEIGHT: 213.38 LBS

## 2023-02-27 DIAGNOSIS — N94.10 DYSPAREUNIA, FEMALE: ICD-10-CM

## 2023-02-27 DIAGNOSIS — N95.2 VAGINAL ATROPHY: ICD-10-CM

## 2023-02-27 DIAGNOSIS — Z72.51 UNPROTECTED SEXUAL INTERCOURSE: Primary | ICD-10-CM

## 2023-02-27 DIAGNOSIS — Z72.51 HIGH RISK HETEROSEXUAL BEHAVIOR: Primary | ICD-10-CM

## 2023-02-27 DIAGNOSIS — N89.8 VAGINAL DISCHARGE: ICD-10-CM

## 2023-02-27 DIAGNOSIS — N89.8 VAGINAL DISCHARGE: Primary | ICD-10-CM

## 2023-02-27 LAB
CANDIDA SPECIES: NEGATIVE
GARDNERELLA: NEGATIVE
TRICHOMONAS: NEGATIVE

## 2023-02-27 PROCEDURE — 1159F PR MEDICATION LIST DOCUMENTED IN MEDICAL RECORD: ICD-10-PCS | Mod: ,,, | Performed by: ADVANCED PRACTICE MIDWIFE

## 2023-02-27 PROCEDURE — 87510 BACTERIAL VAGINOSIS: ICD-10-PCS | Mod: ,,, | Performed by: CLINICAL MEDICAL LABORATORY

## 2023-02-27 PROCEDURE — 1159F MED LIST DOCD IN RCRD: CPT | Mod: ,,, | Performed by: ADVANCED PRACTICE MIDWIFE

## 2023-02-27 PROCEDURE — 3077F SYST BP >= 140 MM HG: CPT | Mod: ,,, | Performed by: ADVANCED PRACTICE MIDWIFE

## 2023-02-27 PROCEDURE — 99213 PR OFFICE/OUTPT VISIT, EST, LEVL III, 20-29 MIN: ICD-10-PCS | Mod: ,,, | Performed by: ADVANCED PRACTICE MIDWIFE

## 2023-02-27 PROCEDURE — 87480 BACTERIAL VAGINOSIS: ICD-10-PCS | Mod: ,,, | Performed by: CLINICAL MEDICAL LABORATORY

## 2023-02-27 PROCEDURE — 3079F PR MOST RECENT DIASTOLIC BLOOD PRESSURE 80-89 MM HG: ICD-10-PCS | Mod: ,,, | Performed by: ADVANCED PRACTICE MIDWIFE

## 2023-02-27 PROCEDURE — 87660 TRICHOMONAS VAGIN DIR PROBE: CPT | Mod: ,,, | Performed by: CLINICAL MEDICAL LABORATORY

## 2023-02-27 PROCEDURE — 3079F DIAST BP 80-89 MM HG: CPT | Mod: ,,, | Performed by: ADVANCED PRACTICE MIDWIFE

## 2023-02-27 PROCEDURE — 87510 GARDNER VAG DNA DIR PROBE: CPT | Mod: ,,, | Performed by: CLINICAL MEDICAL LABORATORY

## 2023-02-27 PROCEDURE — 99213 OFFICE O/P EST LOW 20 MIN: CPT | Mod: ,,, | Performed by: ADVANCED PRACTICE MIDWIFE

## 2023-02-27 PROCEDURE — 87480 CANDIDA DNA DIR PROBE: CPT | Mod: ,,, | Performed by: CLINICAL MEDICAL LABORATORY

## 2023-02-27 PROCEDURE — 3008F PR BODY MASS INDEX (BMI) DOCUMENTED: ICD-10-PCS | Mod: ,,, | Performed by: ADVANCED PRACTICE MIDWIFE

## 2023-02-27 PROCEDURE — 87660 BACTERIAL VAGINOSIS: ICD-10-PCS | Mod: ,,, | Performed by: CLINICAL MEDICAL LABORATORY

## 2023-02-27 PROCEDURE — 3008F BODY MASS INDEX DOCD: CPT | Mod: ,,, | Performed by: ADVANCED PRACTICE MIDWIFE

## 2023-02-27 PROCEDURE — 3077F PR MOST RECENT SYSTOLIC BLOOD PRESSURE >= 140 MM HG: ICD-10-PCS | Mod: ,,, | Performed by: ADVANCED PRACTICE MIDWIFE

## 2023-02-27 RX ORDER — ESTRADIOL 0.1 MG/G
1 CREAM VAGINAL DAILY
Qty: 42.5 G | Refills: 1 | Status: SHIPPED | OUTPATIENT
Start: 2023-02-27 | End: 2023-05-08

## 2023-02-27 NOTE — PROGRESS NOTES
Subjective:       Patient ID: Tequila Vázquez is a 48 y.o. female.    Chief Complaint: Spotting when urinating    Ms Vázquez had intercourse for the first time in two years and it was painful.  She has had pain with urination and some spotting since having sex.  She did not use a condom.  She had a mammogram in January and has had a hysterectomy with one ovary removed.  She denies any hot flashes.  She has a h/o syphilis.  Review of Systems   Constitutional: Negative.    HENT: Negative.     Respiratory: Negative.     Cardiovascular: Negative.    Genitourinary:  Positive for dysuria, vaginal bleeding, vaginal discharge and vaginal dryness.   Neurological: Negative.    Psychiatric/Behavioral: Negative.         Objective:      Physical Exam  Vitals and nursing note reviewed.   Constitutional:       Appearance: She is normal weight.   HENT:      Head: Normocephalic.      Nose: Nose normal.   Eyes:      Extraocular Movements: Extraocular movements intact.   Cardiovascular:      Rate and Rhythm: Normal rate.   Pulmonary:      Effort: Pulmonary effort is normal.   Abdominal:      General: Abdomen is flat.      Palpations: Abdomen is soft.   Genitourinary:     General: Normal vulva.      Exam position: Lithotomy position.      Labia:         Right: No rash.         Left: No rash.       Vagina: Vaginal discharge and bleeding present.      Uterus: Absent.       Comments: The vagina is atropic.  There is spotting an a yellowish discharge noted.  Affirm collected.  Skin:     General: Skin is warm and dry.   Neurological:      Mental Status: She is alert and oriented to person, place, and time.   Psychiatric:         Mood and Affect: Mood normal.         Behavior: Behavior normal.       Assessment:       Problem List Items Addressed This Visit    None  Visit Diagnoses       Unprotected sexual intercourse    -  Primary    Dyspareunia, female        Vaginal atrophy        Relevant Medications    estradioL (ESTRACE) 0.01 % (0.1  mg/gram) vaginal cream    Vaginal discharge                Plan:     Call result of affirm    Use a vaginal lubricant when she has sex     Rx for Estrace vaginal cream    Condoms recommended    F/u 3 months.

## 2023-02-28 ENCOUNTER — TELEPHONE (OUTPATIENT)
Dept: OBSTETRICS AND GYNECOLOGY | Facility: CLINIC | Age: 48
End: 2023-02-28
Payer: COMMERCIAL

## 2023-03-16 ENCOUNTER — OFFICE VISIT (OUTPATIENT)
Dept: FAMILY MEDICINE | Facility: CLINIC | Age: 48
End: 2023-03-16
Payer: COMMERCIAL

## 2023-03-16 VITALS
HEIGHT: 65 IN | DIASTOLIC BLOOD PRESSURE: 75 MMHG | BODY MASS INDEX: 34.82 KG/M2 | OXYGEN SATURATION: 98 % | HEART RATE: 82 BPM | TEMPERATURE: 98 F | WEIGHT: 209 LBS | SYSTOLIC BLOOD PRESSURE: 107 MMHG

## 2023-03-16 DIAGNOSIS — E55.9 VITAMIN D DEFICIENCY: ICD-10-CM

## 2023-03-16 DIAGNOSIS — H02.401 PTOSIS OF EYELID, RIGHT: ICD-10-CM

## 2023-03-16 DIAGNOSIS — J01.00 ACUTE MAXILLARY SINUSITIS, RECURRENCE NOT SPECIFIED: Primary | ICD-10-CM

## 2023-03-16 DIAGNOSIS — I10 HYPERTENSION, UNSPECIFIED TYPE: ICD-10-CM

## 2023-03-16 DIAGNOSIS — E11.9 TYPE 2 DIABETES MELLITUS WITHOUT COMPLICATION, WITHOUT LONG-TERM CURRENT USE OF INSULIN: ICD-10-CM

## 2023-03-16 DIAGNOSIS — J30.2 SEASONAL ALLERGIES: ICD-10-CM

## 2023-03-16 DIAGNOSIS — K21.9 GASTROESOPHAGEAL REFLUX DISEASE, UNSPECIFIED WHETHER ESOPHAGITIS PRESENT: ICD-10-CM

## 2023-03-16 DIAGNOSIS — E78.5 HYPERLIPIDEMIA, UNSPECIFIED HYPERLIPIDEMIA TYPE: ICD-10-CM

## 2023-03-16 DIAGNOSIS — G43.009 MIGRAINE WITHOUT AURA AND WITHOUT STATUS MIGRAINOSUS, NOT INTRACTABLE: ICD-10-CM

## 2023-03-16 DIAGNOSIS — G73.7 MYOPATHY IN DISEASES CLASSIFIED ELSEWHERE: ICD-10-CM

## 2023-03-16 LAB
25(OH)D3 SERPL-MCNC: 17 NG/ML
EST. AVERAGE GLUCOSE BLD GHB EST-MCNC: 257 MG/DL
HBA1C MFR BLD HPLC: 10.3 % (ref 4.5–6.6)

## 2023-03-16 PROCEDURE — 82306 VITAMIN D: ICD-10-PCS | Mod: ,,, | Performed by: CLINICAL MEDICAL LABORATORY

## 2023-03-16 PROCEDURE — 1160F RVW MEDS BY RX/DR IN RCRD: CPT | Mod: ,,, | Performed by: NURSE PRACTITIONER

## 2023-03-16 PROCEDURE — 1159F MED LIST DOCD IN RCRD: CPT | Mod: ,,, | Performed by: NURSE PRACTITIONER

## 2023-03-16 PROCEDURE — 3074F SYST BP LT 130 MM HG: CPT | Mod: ,,, | Performed by: NURSE PRACTITIONER

## 2023-03-16 PROCEDURE — 83036 HEMOGLOBIN A1C: ICD-10-PCS | Mod: ,,, | Performed by: CLINICAL MEDICAL LABORATORY

## 2023-03-16 PROCEDURE — 99213 PR OFFICE/OUTPT VISIT, EST, LEVL III, 20-29 MIN: ICD-10-PCS | Mod: ,,, | Performed by: NURSE PRACTITIONER

## 2023-03-16 PROCEDURE — 3008F PR BODY MASS INDEX (BMI) DOCUMENTED: ICD-10-PCS | Mod: ,,, | Performed by: NURSE PRACTITIONER

## 2023-03-16 PROCEDURE — 83036 HEMOGLOBIN GLYCOSYLATED A1C: CPT | Mod: ,,, | Performed by: CLINICAL MEDICAL LABORATORY

## 2023-03-16 PROCEDURE — 3078F DIAST BP <80 MM HG: CPT | Mod: ,,, | Performed by: NURSE PRACTITIONER

## 2023-03-16 PROCEDURE — 3008F BODY MASS INDEX DOCD: CPT | Mod: ,,, | Performed by: NURSE PRACTITIONER

## 2023-03-16 PROCEDURE — 1160F PR REVIEW ALL MEDS BY PRESCRIBER/CLIN PHARMACIST DOCUMENTED: ICD-10-PCS | Mod: ,,, | Performed by: NURSE PRACTITIONER

## 2023-03-16 PROCEDURE — 99213 OFFICE O/P EST LOW 20 MIN: CPT | Mod: ,,, | Performed by: NURSE PRACTITIONER

## 2023-03-16 PROCEDURE — 1159F PR MEDICATION LIST DOCUMENTED IN MEDICAL RECORD: ICD-10-PCS | Mod: ,,, | Performed by: NURSE PRACTITIONER

## 2023-03-16 PROCEDURE — 82306 VITAMIN D 25 HYDROXY: CPT | Mod: ,,, | Performed by: CLINICAL MEDICAL LABORATORY

## 2023-03-16 PROCEDURE — 3078F PR MOST RECENT DIASTOLIC BLOOD PRESSURE < 80 MM HG: ICD-10-PCS | Mod: ,,, | Performed by: NURSE PRACTITIONER

## 2023-03-16 PROCEDURE — 3074F PR MOST RECENT SYSTOLIC BLOOD PRESSURE < 130 MM HG: ICD-10-PCS | Mod: ,,, | Performed by: NURSE PRACTITIONER

## 2023-03-16 RX ORDER — SEMAGLUTIDE 1.34 MG/ML
0.5 INJECTION, SOLUTION SUBCUTANEOUS
Qty: 3 EACH | Refills: 0 | Status: SHIPPED | OUTPATIENT
Start: 2023-03-16 | End: 2023-03-28 | Stop reason: DRUGHIGH

## 2023-03-16 RX ORDER — AMOXICILLIN AND CLAVULANATE POTASSIUM 875; 125 MG/1; MG/1
1 TABLET, FILM COATED ORAL EVERY 12 HOURS
Qty: 20 TABLET | Refills: 0 | Status: SHIPPED | OUTPATIENT
Start: 2023-03-16 | End: 2023-03-26

## 2023-03-16 RX ORDER — OMEPRAZOLE 20 MG/1
20 CAPSULE, DELAYED RELEASE ORAL DAILY
Qty: 90 CAPSULE | Refills: 0 | Status: SHIPPED | OUTPATIENT
Start: 2023-03-16 | End: 2023-06-09 | Stop reason: SDUPTHER

## 2023-03-16 RX ORDER — FLUTICASONE PROPIONATE 50 MCG
1 SPRAY, SUSPENSION (ML) NASAL DAILY
Qty: 16 G | Refills: 2 | Status: SHIPPED | OUTPATIENT
Start: 2023-03-16 | End: 2023-06-09 | Stop reason: SDUPTHER

## 2023-03-16 RX ORDER — LOVASTATIN 20 MG/1
20 TABLET ORAL
COMMUNITY
End: 2023-05-22 | Stop reason: SDUPTHER

## 2023-03-16 RX ORDER — METFORMIN HYDROCHLORIDE 500 MG/1
500 TABLET ORAL 2 TIMES DAILY WITH MEALS
Qty: 180 TABLET | Refills: 0 | Status: SHIPPED | OUTPATIENT
Start: 2023-03-16 | End: 2023-06-09 | Stop reason: SDUPTHER

## 2023-03-16 NOTE — PROGRESS NOTES
Clinic note     Patient name: Tequila Vázquez is a 48 y.o. female   Chief compliant   Chief Complaint   Patient presents with    Sore Throat     Lymph nodes sore on the right side. Started last week.  No fever. Did have some sneezing.        Subjective     History of present illness   In clinic of evaluation of swollen lymph nodes, sore throat and sneezing x two weeks   Denies any fever or body aches   Hx of DM, checking blood glucose daily with fasting readings   Last A1c was 6.4, this will be rechecked today   Hx of right eyelid ptosis and weakness of RUE and RLE, myasthenia gravis; recently seen by [facility] neurology,  Dr Desir and ANDREA Rogers, NP-C at  neurology [facility]  She is walking for exercise almost daily, weight is stable   She has quit using smokeless tobacco three weeks ago   She states neurology in [place] wanted her Vit d level checked, will obtain this in clinic today        Social History     Tobacco Use    Smoking status: Never     Passive exposure: Never    Smokeless tobacco: Former     Types: Snuff   Substance Use Topics    Alcohol use: Never    Drug use: Never       Review of patient's allergies indicates:   Allergen Reactions    Aspirin Hives       Past Medical History:   Diagnosis Date    Asthma     Diabetes mellitus     Late syphilis, 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 Added automatically from request for surgery [id number]       Past Surgical History:   Procedure Laterality Date    DILATION AND CURETTAGE OF UTERUS  2000    preformed at [facility]    HYSTERECTOMY      OOPHORECTOMY      pt has had one ovary removed, unsure side    TUBAL LIGATION  1999     Breanna        Family History   Problem Relation Age of Onset    Asthma Mother     Coronary artery disease Mother     Diabetes Father     Heart disease Father     Cancer Sister         Breast    Breast cancer Sister          Current  Outpatient Medications:     albuterol (PROVENTIL/VENTOLIN HFA) 90 mcg/actuation inhaler, Inhale 2 puffs into the lungs every 6 (six) hours as needed for Wheezing. Rescue, Disp: 18 g, Rfl: 2    estradioL (ESTRACE) 0.01 % (0.1 mg/gram) vaginal cream, Place 1 g vaginally once daily., Disp: 42.5 g, Rfl: 1    lovastatin (MEVACOR) 20 MG tablet, Take 20 mg by mouth., Disp: , Rfl:     predniSONE (DELTASONE) 10 MG tablet, Take 1.5 tablets by mouth Daily., Disp: , Rfl:     pregabalin (LYRICA) 25 MG capsule, Take 25 mg by mouth 3 (three) times daily., Disp: , Rfl:     pyridostigmine (MESTINON) 60 mg Tab, Take 60 mg by mouth 3 (three) times daily., Disp: , Rfl:     topiramate (TOPAMAX) 50 MG tablet, Take 1 tablet (50 mg total) by mouth 2 (two) times daily., Disp: 180 tablet, Rfl: 0    amoxicillin-clavulanate 875-125mg (AUGMENTIN) 875-125 mg per tablet, Take 1 tablet by mouth every 12 (twelve) hours. for 10 days, Disp: 20 tablet, Rfl: 0    fluticasone propionate (FLONASE) 50 mcg/actuation nasal spray, 1 spray (50 mcg total) by Each Nostril route once daily., Disp: 16 g, Rfl: 2    lancets (E-Z JECT LANCETS) 32 gauge Misc, 100 lancets by Misc.(Non-Drug; Combo Route) route. USE TO CHECK GLUCOSE ONCE DAILY, Disp: , Rfl:     metFORMIN (GLUCOPHAGE) 500 MG tablet, Take 1 tablet (500 mg total) by mouth 2 (two) times daily with meals., Disp: 180 tablet, Rfl: 0    omeprazole (PRILOSEC) 20 MG capsule, Take 1 capsule (20 mg total) by mouth once daily., Disp: 90 capsule, Rfl: 0    semaglutide (OZEMPIC) 0.25 mg or 0.5 mg(2 mg/1.5 mL) pen injector, Inject 0.5 mg into the skin every 7 days., Disp: 3 each, Rfl: 0    Review of Systems   Constitutional:  Negative for appetite change, chills, fatigue, fever and unexpected weight change.   HENT:  Positive for sneezing.    Respiratory:  Negative for cough and shortness of breath.    Cardiovascular:  Negative for chest pain, palpitations and leg swelling.   Gastrointestinal:  Negative for abdominal  "pain, change in bowel habit, constipation, diarrhea, nausea, vomiting and change in bowel habit.   Genitourinary:  Negative for dysuria and frequency.   Musculoskeletal:  Negative for arthralgias, gait problem and myalgias.   Neurological:  Negative for dizziness, syncope, light-headedness and headaches.   Psychiatric/Behavioral:  Negative for dysphoric mood and sleep disturbance. The patient is not nervous/anxious.      Objective     /75   Pulse 82   Temp 97.5 °F (36.4 °C)   Ht 5' 5" (1.651 m)   Wt 94.8 kg (209 lb)   SpO2 98%   BMI 34.78 kg/m²     Physical Exam   Constitutional: She is oriented to person, place, and time. No distress.   HENT:   Head: Atraumatic.   Nose: Mucosal edema present. Right sinus exhibits maxillary sinus tenderness. Left sinus exhibits maxillary sinus tenderness.   Mouth/Throat: Mucous membranes are moist. Oropharyngeal erythema present.   Eyes:   Right eye ptosis   Cardiovascular: Normal rate and regular rhythm. Pulmonary:      Effort: Pulmonary effort is normal. No respiratory distress.      Breath sounds: Normal breath sounds. No wheezing, rhonchi or rales.     Abdominal: Soft. Bowel sounds are normal. She exhibits no distension. There is no abdominal tenderness.   Musculoskeletal:         General: Normal range of motion.      Cervical back: Neck supple.      Right lower leg: No edema.      Left lower leg: No edema.   Lymphadenopathy:        Head (right side): Submandibular and preauricular adenopathy present.        Head (left side): Preauricular adenopathy present.        Right cervical: No superficial cervical adenopathy present.       Left cervical: No superficial cervical adenopathy present.   Neurological: She is alert and oriented to person, place, and time. Gait normal.   Skin: Skin is warm and dry.   Psychiatric: Her behavior is normal. Mood normal.     Lab Results   Component Value Date    WBC 4.66 12/28/2022    HGB 13.1 12/28/2022    HCT 38.1 12/28/2022    MCV 92.9 " 12/28/2022     12/28/2022       CMP  Sodium   Date Value Ref Range Status   12/28/2022 144 136 - 145 mmol/L Final     Potassium   Date Value Ref Range Status   12/28/2022 4.3 3.5 - 5.1 mmol/L Final     Chloride   Date Value Ref Range Status   12/28/2022 107 98 - 107 mmol/L Final     CO2   Date Value Ref Range Status   12/28/2022 30 21 - 32 mmol/L Final     Glucose   Date Value Ref Range Status   12/28/2022 117 (H) 74 - 106 mg/dL Final     BUN   Date Value Ref Range Status   12/28/2022 8 7 - 18 mg/dL Final     Creatinine   Date Value Ref Range Status   12/28/2022 0.84 0.55 - 1.02 mg/dL Final     Calcium   Date Value Ref Range Status   12/28/2022 8.0 (L) 8.5 - 10.1 mg/dL Final     Total Protein   Date Value Ref Range Status   12/28/2022 7.1 6.4 - 8.2 g/dL Final     Albumin   Date Value Ref Range Status   12/28/2022 3.7 3.5 - 5.0 g/dL Final     Bilirubin, Total   Date Value Ref Range Status   12/28/2022 0.4 >0.0 - 1.2 mg/dL Final     Alk Phos   Date Value Ref Range Status   12/28/2022 105 (H) 39 - 100 U/L Final     AST   Date Value Ref Range Status   12/28/2022 17 15 - 37 U/L Final     ALT   Date Value Ref Range Status   12/28/2022 24 13 - 56 U/L Final     Anion Gap   Date Value Ref Range Status   12/28/2022 11 7 - 16 mmol/L Final     eGFR    Date Value Ref Range Status   08/13/2021 67 >=60 mL/min/1.73m² Final     eGFR   Date Value Ref Range Status   04/13/2022 61 >=60 mL/min/1.73m² Final     Lab Results   Component Value Date    TSH 0.766 08/13/2021     Lab Results   Component Value Date    CHOL 216 (H) 04/13/2022    CHOL 250 (H) 04/19/2021     Lab Results   Component Value Date    HDL 58 04/13/2022    HDL 54 04/19/2021     Lab Results   Component Value Date    LDLCALC 137 04/13/2022    LDLCALC 166 04/19/2021     Lab Results   Component Value Date    TRIG 106 04/13/2022    TRIG 150 04/19/2021     Lab Results   Component Value Date    CHOLHDL 3.7 04/13/2022    CHOLHDL 4.6 04/19/2021     Lab  Results   Component Value Date    HGBA1C 6.4 12/28/2022         Assessment and Plan   Acute maxillary sinusitis, recurrence not specified  -     amoxicillin-clavulanate 875-125mg (AUGMENTIN) 875-125 mg per tablet; Take 1 tablet by mouth every 12 (twelve) hours. for 10 days  Dispense: 20 tablet; Refill: 0    Type 2 diabetes mellitus without complication, without long-term current use of insulin  -     Hemoglobin A1C; Future; Expected date: 03/16/2023  -     semaglutide (OZEMPIC) 0.25 mg or 0.5 mg(2 mg/1.5 mL) pen injector; Inject 0.5 mg into the skin every 7 days.  Dispense: 3 each; Refill: 0  -     metFORMIN (GLUCOPHAGE) 500 MG tablet; Take 1 tablet (500 mg total) by mouth 2 (two) times daily with meals.  Dispense: 180 tablet; Refill: 0    Gastroesophageal reflux disease, unspecified whether esophagitis present  -     omeprazole (PRILOSEC) 20 MG capsule; Take 1 capsule (20 mg total) by mouth once daily.  Dispense: 90 capsule; Refill: 0    Seasonal allergies  -     fluticasone propionate (FLONASE) 50 mcg/actuation nasal spray; 1 spray (50 mcg total) by Each Nostril route once daily.  Dispense: 16 g; Refill: 2    Vitamin D deficiency  -     Vitamin D; Future; Expected date: 03/16/2023    Myopathy in diseases classified elsewhere    Hyperlipidemia, unspecified hyperlipidemia type    Hypertension, unspecified type    Ptosis of eyelid, right    Migraine without aura and without status migrainosus, not intractable          Patient Instructions  Patient Instructions   Use flonase daily   Augmentin as prescribed x ten days   Follow up in clinic in two weeks, sooner if symptoms worsen     Refills provided on routine medications   Lab obtained in clinic today, we will notify you of results and any necessary changes to plan of care

## 2023-03-16 NOTE — PATIENT INSTRUCTIONS
Use flonase daily   Augmentin as prescribed x ten days   Follow up in clinic in two weeks, sooner if symptoms worsen     Refills provided on routine medications   Lab obtained in clinic today, we will notify you of results and any necessary changes to plan of care

## 2023-03-20 DIAGNOSIS — E55.9 VITAMIN D DEFICIENCY: Primary | ICD-10-CM

## 2023-03-20 RX ORDER — ERGOCALCIFEROL 1.25 MG/1
50000 CAPSULE ORAL
Qty: 8 CAPSULE | Refills: 0 | Status: SHIPPED | OUTPATIENT
Start: 2023-03-20 | End: 2023-06-12 | Stop reason: ALTCHOICE

## 2023-03-28 ENCOUNTER — OFFICE VISIT (OUTPATIENT)
Dept: FAMILY MEDICINE | Facility: CLINIC | Age: 48
End: 2023-03-28
Payer: COMMERCIAL

## 2023-03-28 VITALS
SYSTOLIC BLOOD PRESSURE: 125 MMHG | HEART RATE: 81 BPM | DIASTOLIC BLOOD PRESSURE: 82 MMHG | RESPIRATION RATE: 13 BRPM | WEIGHT: 203 LBS | BODY MASS INDEX: 33.82 KG/M2 | HEIGHT: 65 IN

## 2023-03-28 DIAGNOSIS — E78.5 HYPERLIPIDEMIA, UNSPECIFIED HYPERLIPIDEMIA TYPE: ICD-10-CM

## 2023-03-28 DIAGNOSIS — E11.9 TYPE 2 DIABETES MELLITUS WITHOUT COMPLICATION, WITHOUT LONG-TERM CURRENT USE OF INSULIN: Primary | ICD-10-CM

## 2023-03-28 DIAGNOSIS — E55.9 VITAMIN D DEFICIENCY: ICD-10-CM

## 2023-03-28 DIAGNOSIS — I10 HYPERTENSION, UNSPECIFIED TYPE: ICD-10-CM

## 2023-03-28 PROCEDURE — 1160F RVW MEDS BY RX/DR IN RCRD: CPT | Mod: ,,, | Performed by: NURSE PRACTITIONER

## 2023-03-28 PROCEDURE — 3008F PR BODY MASS INDEX (BMI) DOCUMENTED: ICD-10-PCS | Mod: ,,, | Performed by: NURSE PRACTITIONER

## 2023-03-28 PROCEDURE — 3079F DIAST BP 80-89 MM HG: CPT | Mod: ,,, | Performed by: NURSE PRACTITIONER

## 2023-03-28 PROCEDURE — 99213 PR OFFICE/OUTPT VISIT, EST, LEVL III, 20-29 MIN: ICD-10-PCS | Mod: ,,, | Performed by: NURSE PRACTITIONER

## 2023-03-28 PROCEDURE — 1159F MED LIST DOCD IN RCRD: CPT | Mod: ,,, | Performed by: NURSE PRACTITIONER

## 2023-03-28 PROCEDURE — 3074F PR MOST RECENT SYSTOLIC BLOOD PRESSURE < 130 MM HG: ICD-10-PCS | Mod: ,,, | Performed by: NURSE PRACTITIONER

## 2023-03-28 PROCEDURE — 3079F PR MOST RECENT DIASTOLIC BLOOD PRESSURE 80-89 MM HG: ICD-10-PCS | Mod: ,,, | Performed by: NURSE PRACTITIONER

## 2023-03-28 PROCEDURE — 3046F PR MOST RECENT HEMOGLOBIN A1C LEVEL > 9.0%: ICD-10-PCS | Mod: ,,, | Performed by: NURSE PRACTITIONER

## 2023-03-28 PROCEDURE — 1160F PR REVIEW ALL MEDS BY PRESCRIBER/CLIN PHARMACIST DOCUMENTED: ICD-10-PCS | Mod: ,,, | Performed by: NURSE PRACTITIONER

## 2023-03-28 PROCEDURE — 1159F PR MEDICATION LIST DOCUMENTED IN MEDICAL RECORD: ICD-10-PCS | Mod: ,,, | Performed by: NURSE PRACTITIONER

## 2023-03-28 PROCEDURE — 3074F SYST BP LT 130 MM HG: CPT | Mod: ,,, | Performed by: NURSE PRACTITIONER

## 2023-03-28 PROCEDURE — 3008F BODY MASS INDEX DOCD: CPT | Mod: ,,, | Performed by: NURSE PRACTITIONER

## 2023-03-28 PROCEDURE — 3046F HEMOGLOBIN A1C LEVEL >9.0%: CPT | Mod: ,,, | Performed by: NURSE PRACTITIONER

## 2023-03-28 PROCEDURE — 99213 OFFICE O/P EST LOW 20 MIN: CPT | Mod: ,,, | Performed by: NURSE PRACTITIONER

## 2023-03-28 RX ORDER — SEMAGLUTIDE 1.34 MG/ML
1 INJECTION, SOLUTION SUBCUTANEOUS
Qty: 9 ML | Refills: 0 | Status: SHIPPED | OUTPATIENT
Start: 2023-03-28 | End: 2023-06-09 | Stop reason: SDUPTHER

## 2023-03-28 NOTE — PROGRESS NOTES
Clinic note     Patient name: Tequila Vázquez is a 48 y.o. female   Chief compliant   Chief Complaint   Patient presents with    Diabetes       Subjective     History of present illness   In clinic for follow up on DM  Last A1c was 10.3 with previous 6.4   She states she had been out of Ozempic for several weeks before lab was obtained due to availability of medication at pharmacy   She has restarted Ozempic and is on dose # 1 of 0.5 mg weekly  She is also now on 15 mg prednisone daily   Checking blood glucose BID and prn, reports readings 135-140 fasting  She is now walking three miles daily for exercise and has lost 6 # since last office visit   Hx of right eyelid ptosis and weakness of RUE and RLE, myasthenia gravis; recently seen by [facility] neurology,  Dr Desir and ANDREA Rogers NP-C at  neurology [facility]  She has follow up in [place] on 3/24/23 to prepare for biopsy   Past Medical History: asthma, DM type 2, latent syphilis, myasthenia gravis, hyperlipidemia    Results of lab obtained on 3/16/23 reviewed, all questions addressed, understanding verbalized         Social History     Tobacco Use    Smoking status: Never     Passive exposure: Never    Smokeless tobacco: Former     Types: Snuff   Substance Use Topics    Alcohol use: Never    Drug use: Never       Review of patient's allergies indicates:   Allergen Reactions    Aspirin Hives       Past Medical History:   Diagnosis Date    Asthma     Diabetes mellitus     Late syphilis, 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 Added automatically from request for surgery [id number]       Past Surgical History:   Procedure Laterality Date    DILATION AND CURETTAGE OF UTERUS  2000    preformed at [facility]    HYSTERECTOMY      OOPHORECTOMY      pt has had one ovary removed, unsure side    TUBAL LIGATION  1999     Breanna        Family History   Problem Relation Age of  Onset    Asthma Mother     Coronary artery disease Mother     Diabetes Father     Heart disease Father     Cancer Sister         Breast    Breast cancer Sister          Current Outpatient Medications:     albuterol (PROVENTIL/VENTOLIN HFA) 90 mcg/actuation inhaler, Inhale 2 puffs into the lungs every 6 (six) hours as needed for Wheezing. Rescue, Disp: 18 g, Rfl: 2    ergocalciferol (ERGOCALCIFEROL) 50,000 unit Cap, Take 1 capsule (50,000 Units total) by mouth every 7 days., Disp: 8 capsule, Rfl: 0    estradioL (ESTRACE) 0.01 % (0.1 mg/gram) vaginal cream, Place 1 g vaginally once daily., Disp: 42.5 g, Rfl: 1    fluticasone propionate (FLONASE) 50 mcg/actuation nasal spray, 1 spray (50 mcg total) by Each Nostril route once daily., Disp: 16 g, Rfl: 2    lancets (E-Z JECT LANCETS) 32 gauge Misc, 100 lancets by Misc.(Non-Drug; Combo Route) route. USE TO CHECK GLUCOSE ONCE DAILY, Disp: , Rfl:     lovastatin (MEVACOR) 20 MG tablet, Take 20 mg by mouth., Disp: , Rfl:     metFORMIN (GLUCOPHAGE) 500 MG tablet, Take 1 tablet (500 mg total) by mouth 2 (two) times daily with meals., Disp: 180 tablet, Rfl: 0    omeprazole (PRILOSEC) 20 MG capsule, Take 1 capsule (20 mg total) by mouth once daily., Disp: 90 capsule, Rfl: 0    predniSONE (DELTASONE) 10 MG tablet, Take 1.5 tablets by mouth Daily., Disp: , Rfl:     pregabalin (LYRICA) 25 MG capsule, Take 25 mg by mouth 3 (three) times daily., Disp: , Rfl:     pyridostigmine (MESTINON) 60 mg Tab, Take 60 mg by mouth 3 (three) times daily., Disp: , Rfl:     semaglutide (OZEMPIC) 1 mg/dose (4 mg/3 mL), Inject 1 mg into the skin every 7 days., Disp: 9 mL, Rfl: 0    topiramate (TOPAMAX) 50 MG tablet, Take 1 tablet (50 mg total) by mouth 2 (two) times daily., Disp: 180 tablet, Rfl: 0    Review of Systems   Constitutional:  Negative for appetite change, chills, fatigue, fever and unexpected weight change.   Respiratory:  Negative for cough and shortness of breath.    Cardiovascular:   "Negative for chest pain, palpitations and leg swelling.   Gastrointestinal:  Negative for abdominal pain, change in bowel habit, constipation, diarrhea, nausea, vomiting and change in bowel habit.   Genitourinary:  Negative for dysuria and frequency.   Musculoskeletal:  Negative for arthralgias, gait problem and myalgias.   Neurological:  Negative for dizziness, syncope, light-headedness and headaches.   Psychiatric/Behavioral:  Negative for dysphoric mood and sleep disturbance. The patient is not nervous/anxious.      Objective     /82   Pulse 81   Resp 13   Ht 5' 5" (1.651 m)   Wt 92.1 kg (203 lb)   BMI 33.78 kg/m²     Physical Exam   Constitutional: She is oriented to person, place, and time. No distress.   HENT:   Head: Atraumatic.   Mouth/Throat: Mucous membranes are moist.   Eyes:   Right eye ptosis    Cardiovascular: Normal rate and regular rhythm. Pulmonary:      Effort: Pulmonary effort is normal. No respiratory distress.      Breath sounds: Normal breath sounds. No wheezing, rhonchi or rales.     Abdominal: Soft. Bowel sounds are normal. She exhibits no distension. There is no abdominal tenderness.   Musculoskeletal:         General: Normal range of motion.      Cervical back: Neck supple.      Right lower leg: No edema.      Left lower leg: No edema.   Neurological: She is alert and oriented to person, place, and time. Gait normal.   Skin: Skin is warm and dry.   Psychiatric: Her behavior is normal. Mood normal.     Lab Results   Component Value Date    WBC 4.66 12/28/2022    HGB 13.1 12/28/2022    HCT 38.1 12/28/2022    MCV 92.9 12/28/2022     12/28/2022       CMP  Sodium   Date Value Ref Range Status   12/28/2022 144 136 - 145 mmol/L Final     Potassium   Date Value Ref Range Status   12/28/2022 4.3 3.5 - 5.1 mmol/L Final     Chloride   Date Value Ref Range Status   12/28/2022 107 98 - 107 mmol/L Final     CO2   Date Value Ref Range Status   12/28/2022 30 21 - 32 mmol/L Final "     Glucose   Date Value Ref Range Status   12/28/2022 117 (H) 74 - 106 mg/dL Final     BUN   Date Value Ref Range Status   12/28/2022 8 7 - 18 mg/dL Final     Creatinine   Date Value Ref Range Status   12/28/2022 0.84 0.55 - 1.02 mg/dL Final     Calcium   Date Value Ref Range Status   12/28/2022 8.0 (L) 8.5 - 10.1 mg/dL Final     Total Protein   Date Value Ref Range Status   12/28/2022 7.1 6.4 - 8.2 g/dL Final     Albumin   Date Value Ref Range Status   12/28/2022 3.7 3.5 - 5.0 g/dL Final     Bilirubin, Total   Date Value Ref Range Status   12/28/2022 0.4 >0.0 - 1.2 mg/dL Final     Alk Phos   Date Value Ref Range Status   12/28/2022 105 (H) 39 - 100 U/L Final     AST   Date Value Ref Range Status   12/28/2022 17 15 - 37 U/L Final     ALT   Date Value Ref Range Status   12/28/2022 24 13 - 56 U/L Final     Anion Gap   Date Value Ref Range Status   12/28/2022 11 7 - 16 mmol/L Final     eGFR    Date Value Ref Range Status   08/13/2021 67 >=60 mL/min/1.73m² Final     eGFR   Date Value Ref Range Status   04/13/2022 61 >=60 mL/min/1.73m² Final     Lab Results   Component Value Date    TSH 0.766 08/13/2021     Lab Results   Component Value Date    CHOL 216 (H) 04/13/2022    CHOL 250 (H) 04/19/2021     Lab Results   Component Value Date    HDL 58 04/13/2022    HDL 54 04/19/2021     Lab Results   Component Value Date    LDLCALC 137 04/13/2022    LDLCALC 166 04/19/2021     Lab Results   Component Value Date    TRIG 106 04/13/2022    TRIG 150 04/19/2021     Lab Results   Component Value Date    CHOLHDL 3.7 04/13/2022    CHOLHDL 4.6 04/19/2021     Lab Results   Component Value Date    HGBA1C 10.3 (H) 03/16/2023         Assessment and Plan   Type 2 diabetes mellitus without complication, without long-term current use of insulin  -     semaglutide (OZEMPIC) 1 mg/dose (4 mg/3 mL); Inject 1 mg into the skin every 7 days.  Dispense: 9 mL; Refill: 0    Hypertension, unspecified type    Hyperlipidemia, unspecified  hyperlipidemia type    Vitamin D deficiency          Patient Instructions  Patient Instructions   Continue current medication   Continue to titrate Ozempic, do 0.5 mg for four weeks then increase to 1 mg weekly   If you have trouble getting Ozempic at pharmacy, call us here at clinic     Follow up when you have been on 1 mg Ozempic for one month, this should be in about 7 weeks; sooner if you need to   Rx for 1 mg Ozempic sent to pharmacy     Pt is advised to monitor and document glucose fasting when you wake up before you eat and 2 hours after meal and bring bg log in to next ov     Ensure to take medications as directed.      Follow diabetic diet as directed.      Work to achieve normal body weight.     Continue your current exercise program

## 2023-03-28 NOTE — PATIENT INSTRUCTIONS
Continue current medication   Continue to titrate Ozempic, do 0.5 mg for four weeks then increase to 1 mg weekly   If you have trouble getting Ozempic at pharmacy, call us here at clinic     Follow up when you have been on 1 mg Ozempic for one month, this should be in about 7 weeks; sooner if you need to   Rx for 1 mg Ozempic sent to pharmacy     Pt is advised to monitor and document glucose fasting when you wake up before you eat and 2 hours after meal and bring bg log in to next ov     Ensure to take medications as directed.      Follow diabetic diet as directed.      Work to achieve normal body weight.     Continue your current exercise program

## 2023-04-27 ENCOUNTER — OFFICE VISIT (OUTPATIENT)
Dept: FAMILY MEDICINE | Facility: CLINIC | Age: 48
End: 2023-04-27
Payer: COMMERCIAL

## 2023-04-27 VITALS
WEIGHT: 203 LBS | HEIGHT: 65 IN | OXYGEN SATURATION: 99 % | DIASTOLIC BLOOD PRESSURE: 80 MMHG | HEART RATE: 77 BPM | SYSTOLIC BLOOD PRESSURE: 115 MMHG | BODY MASS INDEX: 33.82 KG/M2

## 2023-04-27 DIAGNOSIS — E11.9 TYPE 2 DIABETES MELLITUS WITHOUT COMPLICATION, WITHOUT LONG-TERM CURRENT USE OF INSULIN: ICD-10-CM

## 2023-04-27 DIAGNOSIS — G73.7 MYOPATHY IN DISEASES CLASSIFIED ELSEWHERE: ICD-10-CM

## 2023-04-27 DIAGNOSIS — L02.214 ABSCESS, GROIN: Primary | ICD-10-CM

## 2023-04-27 PROCEDURE — 1159F PR MEDICATION LIST DOCUMENTED IN MEDICAL RECORD: ICD-10-PCS | Mod: ,,, | Performed by: NURSE PRACTITIONER

## 2023-04-27 PROCEDURE — 1160F PR REVIEW ALL MEDS BY PRESCRIBER/CLIN PHARMACIST DOCUMENTED: ICD-10-PCS | Mod: ,,, | Performed by: NURSE PRACTITIONER

## 2023-04-27 PROCEDURE — 99213 OFFICE O/P EST LOW 20 MIN: CPT | Mod: ,,, | Performed by: NURSE PRACTITIONER

## 2023-04-27 PROCEDURE — 99213 PR OFFICE/OUTPT VISIT, EST, LEVL III, 20-29 MIN: ICD-10-PCS | Mod: ,,, | Performed by: NURSE PRACTITIONER

## 2023-04-27 PROCEDURE — 3074F PR MOST RECENT SYSTOLIC BLOOD PRESSURE < 130 MM HG: ICD-10-PCS | Mod: ,,, | Performed by: NURSE PRACTITIONER

## 2023-04-27 PROCEDURE — 3079F DIAST BP 80-89 MM HG: CPT | Mod: ,,, | Performed by: NURSE PRACTITIONER

## 2023-04-27 PROCEDURE — 1159F MED LIST DOCD IN RCRD: CPT | Mod: ,,, | Performed by: NURSE PRACTITIONER

## 2023-04-27 PROCEDURE — 3008F PR BODY MASS INDEX (BMI) DOCUMENTED: ICD-10-PCS | Mod: ,,, | Performed by: NURSE PRACTITIONER

## 2023-04-27 PROCEDURE — 1160F RVW MEDS BY RX/DR IN RCRD: CPT | Mod: ,,, | Performed by: NURSE PRACTITIONER

## 2023-04-27 PROCEDURE — 3008F BODY MASS INDEX DOCD: CPT | Mod: ,,, | Performed by: NURSE PRACTITIONER

## 2023-04-27 PROCEDURE — 3046F HEMOGLOBIN A1C LEVEL >9.0%: CPT | Mod: ,,, | Performed by: NURSE PRACTITIONER

## 2023-04-27 PROCEDURE — 3046F PR MOST RECENT HEMOGLOBIN A1C LEVEL > 9.0%: ICD-10-PCS | Mod: ,,, | Performed by: NURSE PRACTITIONER

## 2023-04-27 PROCEDURE — 3074F SYST BP LT 130 MM HG: CPT | Mod: ,,, | Performed by: NURSE PRACTITIONER

## 2023-04-27 PROCEDURE — 3079F PR MOST RECENT DIASTOLIC BLOOD PRESSURE 80-89 MM HG: ICD-10-PCS | Mod: ,,, | Performed by: NURSE PRACTITIONER

## 2023-04-27 RX ORDER — SULFAMETHOXAZOLE AND TRIMETHOPRIM 800; 160 MG/1; MG/1
1 TABLET ORAL 2 TIMES DAILY
Qty: 20 TABLET | Refills: 0 | Status: SHIPPED | OUTPATIENT
Start: 2023-04-27 | End: 2023-05-07

## 2023-04-27 NOTE — PATIENT INSTRUCTIONS
Bactrim as prescribed   Use wet wipes to keep area clean between showering     Follow up in one week if symptoms persist sooner if symptoms worsen       Continue current Ozempic dose, when you have had 4 weekly doses of 1 mg follow up in clinic with blood glucose log for further medication management       Pt is advised to monitor and document glucose fasting when you wake up before you eat and 2 hours after meal and bring bg log in to next ov   Ensure to take medications as directed.    Follow diabetic diet as directed.    Work to achieve normal body weight.   Ensure to exercise 4-5 times per week for 20 minutes.

## 2023-04-27 NOTE — PROGRESS NOTES
"Clinic note     Patient name: Tequila Vázquez is a 48 y.o. female   Chief compliant   Chief Complaint   Patient presents with    Recurrent Skin Infections     Noticed Friday. No drainage, hard and painful. No fever.     Diabetes     States BG been 200, taking meds and eating DM diet.        Subjective     History of present illness   In clinic for evaluation of "knot" in groin  area,symptoms noticed 6 days ago; states areas are hard and painful, denies any fever or drainage        Hx of Dm, last A1c was 10.3, previous A1c was 6.4; this is due to recheck after 6/14/23    Checking blood glucose BID and prn, reports fasting readings are 170 and pm readings are 200  She is currently on Ozempic 1 mg x two weeks  She continue to walk daily for exercise and is following dietary recommendations.   Hx of right eyelid ptosis and weakness of RUE and RLE, myasthenia gravis; recently seen by [facility] neurology,  Dr Desir and ANDREA Rogers, NP-C at  neurology [facility]  She was seen by Dr Barnes and Dr Villagomez, [facility] general surgery to schedule vastus lateralis muscle biopsy which is scheduled for May 13th; clinic note reviewed   Past Medical History: asthma, DM type 2, latent syphilis, myasthenia gravis, hyperlipidemia    Continues to take prednisone 15 mg daily       Social History     Tobacco Use    Smoking status: Never     Passive exposure: Never    Smokeless tobacco: Former     Types: Snuff   Substance Use Topics    Alcohol use: Never    Drug use: Never       Review of patient's allergies indicates:   Allergen Reactions    Aspirin Hives       Past Medical History:   Diagnosis Date    Asthma     Diabetes mellitus     Late syphilis, 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 Added automatically from request for surgery [id number]       Past Surgical History:   Procedure Laterality Date    DILATION AND CURETTAGE OF UTERUS  2000    preformed " at [facility]    HYSTERECTOMY      OOPHORECTOMY      pt has had one ovary removed, unsure side    TUBAL LIGATION  1999     [facility]        Family History   Problem Relation Age of Onset    Asthma Mother     Coronary artery disease Mother     Diabetes Father     Heart disease Father     Cancer Sister         Breast    Breast cancer Sister          Current Outpatient Medications:     albuterol (PROVENTIL/VENTOLIN HFA) 90 mcg/actuation inhaler, Inhale 2 puffs into the lungs every 6 (six) hours as needed for Wheezing. Rescue, Disp: 18 g, Rfl: 2    ergocalciferol (ERGOCALCIFEROL) 50,000 unit Cap, Take 1 capsule (50,000 Units total) by mouth every 7 days., Disp: 8 capsule, Rfl: 0    fluticasone propionate (FLONASE) 50 mcg/actuation nasal spray, 1 spray (50 mcg total) by Each Nostril route once daily., Disp: 16 g, Rfl: 2    lovastatin (MEVACOR) 20 MG tablet, Take 20 mg by mouth., Disp: , Rfl:     metFORMIN (GLUCOPHAGE) 500 MG tablet, Take 1 tablet (500 mg total) by mouth 2 (two) times daily with meals., Disp: 180 tablet, Rfl: 0    omeprazole (PRILOSEC) 20 MG capsule, Take 1 capsule (20 mg total) by mouth once daily., Disp: 90 capsule, Rfl: 0    predniSONE (DELTASONE) 10 MG tablet, Take 1.5 tablets by mouth Daily., Disp: , Rfl:     pregabalin (LYRICA) 25 MG capsule, Take 25 mg by mouth 3 (three) times daily., Disp: , Rfl:     pyridostigmine (MESTINON) 60 mg Tab, Take 60 mg by mouth 3 (three) times daily., Disp: , Rfl:     semaglutide (OZEMPIC) 1 mg/dose (4 mg/3 mL), Inject 1 mg into the skin every 7 days., Disp: 9 mL, Rfl: 0    estradioL (ESTRACE) 0.01 % (0.1 mg/gram) vaginal cream, Place 1 g vaginally once daily. (Patient not taking: Reported on 4/27/2023), Disp: 42.5 g, Rfl: 1    lancets (E-Z JECT LANCETS) 32 gauge Misc, 100 lancets by Misc.(Non-Drug; Combo Route) route. USE TO CHECK GLUCOSE ONCE DAILY, Disp: , Rfl:     sulfamethoxazole-trimethoprim 800-160mg (BACTRIM DS) 800-160 mg Tab, Take 1 tablet by mouth 2 (two)  "times daily. for 10 days, Disp: 20 tablet, Rfl: 0    topiramate (TOPAMAX) 50 MG tablet, Take 1 tablet (50 mg total) by mouth 2 (two) times daily., Disp: 180 tablet, Rfl: 0    Review of Systems   Constitutional:  Negative for chills, fatigue and fever.   Respiratory:  Negative for cough and shortness of breath.    Cardiovascular:  Negative for chest pain and palpitations.   Gastrointestinal:  Negative for abdominal pain, diarrhea, nausea and vomiting.   Endocrine: Negative for polydipsia and polyuria.   Genitourinary:  Negative for dysuria.        Lump in left groin    Musculoskeletal:  Negative for gait problem.   Neurological:  Negative for light-headedness and headaches.     Objective     /80   Pulse 77   Ht 5' 5" (1.651 m)   Wt 92.1 kg (203 lb)   SpO2 99%   BMI 33.78 kg/m²     Physical Exam   Constitutional: She is oriented to person, place, and time. No distress.   HENT:   Head: Atraumatic.   Mouth/Throat: Mucous membranes are moist.   Eyes:   Right eye ptosis    Cardiovascular: Normal rate and regular rhythm. Pulmonary:      Effort: Pulmonary effort is normal. No respiratory distress.      Breath sounds: Normal breath sounds. No wheezing, rhonchi or rales.     Abdominal: Soft. Bowel sounds are normal. There is no abdominal tenderness.   Genitourinary:         Musculoskeletal:         General: Normal range of motion.      Cervical back: Neck supple.      Right lower leg: No edema.      Left lower leg: No edema.   Neurological: She is alert and oriented to person, place, and time. Gait normal.   Skin: Skin is warm and dry.   Psychiatric: Her behavior is normal. Mood normal.     Lab Results   Component Value Date    WBC 4.66 12/28/2022    HGB 13.1 12/28/2022    HCT 38.1 12/28/2022    MCV 92.9 12/28/2022     12/28/2022       CMP  Sodium   Date Value Ref Range Status   12/28/2022 144 136 - 145 mmol/L Final     Potassium   Date Value Ref Range Status   12/28/2022 4.3 3.5 - 5.1 mmol/L Final "     Chloride   Date Value Ref Range Status   12/28/2022 107 98 - 107 mmol/L Final     CO2   Date Value Ref Range Status   12/28/2022 30 21 - 32 mmol/L Final     Glucose   Date Value Ref Range Status   12/28/2022 117 (H) 74 - 106 mg/dL Final     BUN   Date Value Ref Range Status   12/28/2022 8 7 - 18 mg/dL Final     Creatinine   Date Value Ref Range Status   12/28/2022 0.84 0.55 - 1.02 mg/dL Final     Calcium   Date Value Ref Range Status   12/28/2022 8.0 (L) 8.5 - 10.1 mg/dL Final     Total Protein   Date Value Ref Range Status   12/28/2022 7.1 6.4 - 8.2 g/dL Final     Albumin   Date Value Ref Range Status   12/28/2022 3.7 3.5 - 5.0 g/dL Final     Bilirubin, Total   Date Value Ref Range Status   12/28/2022 0.4 >0.0 - 1.2 mg/dL Final     Alk Phos   Date Value Ref Range Status   12/28/2022 105 (H) 39 - 100 U/L Final     AST   Date Value Ref Range Status   12/28/2022 17 15 - 37 U/L Final     ALT   Date Value Ref Range Status   12/28/2022 24 13 - 56 U/L Final     Anion Gap   Date Value Ref Range Status   12/28/2022 11 7 - 16 mmol/L Final     eGFR    Date Value Ref Range Status   08/13/2021 67 >=60 mL/min/1.73m² Final     eGFR   Date Value Ref Range Status   04/13/2022 61 >=60 mL/min/1.73m² Final     Lab Results   Component Value Date    TSH 0.766 08/13/2021     Lab Results   Component Value Date    CHOL 216 (H) 04/13/2022    CHOL 250 (H) 04/19/2021     Lab Results   Component Value Date    HDL 58 04/13/2022    HDL 54 04/19/2021     Lab Results   Component Value Date    LDLCALC 137 04/13/2022    LDLCALC 166 04/19/2021     Lab Results   Component Value Date    TRIG 106 04/13/2022    TRIG 150 04/19/2021     Lab Results   Component Value Date    CHOLHDL 3.7 04/13/2022    CHOLHDL 4.6 04/19/2021     Lab Results   Component Value Date    HGBA1C 10.3 (H) 03/16/2023         Assessment and Plan   Abscess, groin  -     sulfamethoxazole-trimethoprim 800-160mg (BACTRIM DS) 800-160 mg Tab; Take 1 tablet by mouth 2  (two) times daily. 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    Type 2 diabetes mellitus without complication, without long-term current use of insulin          Patient Instructions  Patient Instructions   Bactrim as prescribed   Use wet wipes to keep area clean between showering     Follow up in one week if symptoms persist sooner if symptoms worsen       Continue current Ozempic dose, when you have had 4 weekly doses of 1 mg follow up in clinic with blood glucose log for further medication management       Pt is advised to monitor and document glucose fasting when you wake up before you eat and 2 hours after meal and bring bg log in to next ov   Ensure to take medications as directed.    Follow diabetic diet as directed.    Work to achieve normal body weight.   Ensure to exercise 4-5 times per week for 20 minutes.

## 2023-04-28 ENCOUNTER — PATIENT OUTREACH (OUTPATIENT)
Dept: ADMINISTRATIVE | Facility: HOSPITAL | Age: 48
End: 2023-04-28

## 2023-04-28 NOTE — PROGRESS NOTES
Health Maintenance Due   Topic Date Due    TETANUS VACCINE  Never done    Pneumococcal Vaccines (Age 0-64) (2 - PCV) 10/17/2015    Colorectal Cancer Screening  Never done    Eye Exam  12/01/2021    Shingles Vaccine (1 of 2) Never done    Foot Exam  04/13/2023    Lipid Panel  04/13/2023    Diabetes Urine Screening  04/13/2023    Mammogram  06/16/2023    Chart review completed for HM test overdue (mammograms, Colonoscopies, pap smears, DM labs, and/or EYE EXAMs)      Care Everywhere and media, updates requested and reviewed.     [facility] and [facility] reviewed.  Pap Smear and  LABS   Unavailable, but pt scheduled for Annual Wellness    [facility] reviewed for immunizations.     Note added to pt upcoming appointment

## 2023-05-08 ENCOUNTER — OFFICE VISIT (OUTPATIENT)
Dept: FAMILY MEDICINE | Facility: CLINIC | Age: 48
End: 2023-05-08
Payer: COMMERCIAL

## 2023-05-08 VITALS
SYSTOLIC BLOOD PRESSURE: 107 MMHG | HEART RATE: 91 BPM | WEIGHT: 201 LBS | DIASTOLIC BLOOD PRESSURE: 74 MMHG | BODY MASS INDEX: 33.49 KG/M2 | HEIGHT: 65 IN | OXYGEN SATURATION: 99 %

## 2023-05-08 DIAGNOSIS — I10 HYPERTENSION, UNSPECIFIED TYPE: ICD-10-CM

## 2023-05-08 DIAGNOSIS — E11.9 TYPE 2 DIABETES MELLITUS WITHOUT COMPLICATION, WITHOUT LONG-TERM CURRENT USE OF INSULIN: Primary | ICD-10-CM

## 2023-05-08 PROCEDURE — 3078F PR MOST RECENT DIASTOLIC BLOOD PRESSURE < 80 MM HG: ICD-10-PCS | Mod: ,,, | Performed by: NURSE PRACTITIONER

## 2023-05-08 PROCEDURE — 99212 PR OFFICE/OUTPT VISIT, EST, LEVL II, 10-19 MIN: ICD-10-PCS | Mod: ,,, | Performed by: NURSE PRACTITIONER

## 2023-05-08 PROCEDURE — 3008F BODY MASS INDEX DOCD: CPT | Mod: ,,, | Performed by: NURSE PRACTITIONER

## 2023-05-08 PROCEDURE — 99212 OFFICE O/P EST SF 10 MIN: CPT | Mod: ,,, | Performed by: NURSE PRACTITIONER

## 2023-05-08 PROCEDURE — 1160F PR REVIEW ALL MEDS BY PRESCRIBER/CLIN PHARMACIST DOCUMENTED: ICD-10-PCS | Mod: ,,, | Performed by: NURSE PRACTITIONER

## 2023-05-08 PROCEDURE — 3074F SYST BP LT 130 MM HG: CPT | Mod: ,,, | Performed by: NURSE PRACTITIONER

## 2023-05-08 PROCEDURE — 3046F HEMOGLOBIN A1C LEVEL >9.0%: CPT | Mod: ,,, | Performed by: NURSE PRACTITIONER

## 2023-05-08 PROCEDURE — 1159F MED LIST DOCD IN RCRD: CPT | Mod: ,,, | Performed by: NURSE PRACTITIONER

## 2023-05-08 PROCEDURE — 3074F PR MOST RECENT SYSTOLIC BLOOD PRESSURE < 130 MM HG: ICD-10-PCS | Mod: ,,, | Performed by: NURSE PRACTITIONER

## 2023-05-08 PROCEDURE — 3078F DIAST BP <80 MM HG: CPT | Mod: ,,, | Performed by: NURSE PRACTITIONER

## 2023-05-08 PROCEDURE — 3046F PR MOST RECENT HEMOGLOBIN A1C LEVEL > 9.0%: ICD-10-PCS | Mod: ,,, | Performed by: NURSE PRACTITIONER

## 2023-05-08 PROCEDURE — 1160F RVW MEDS BY RX/DR IN RCRD: CPT | Mod: ,,, | Performed by: NURSE PRACTITIONER

## 2023-05-08 PROCEDURE — 1159F PR MEDICATION LIST DOCUMENTED IN MEDICAL RECORD: ICD-10-PCS | Mod: ,,, | Performed by: NURSE PRACTITIONER

## 2023-05-08 PROCEDURE — 3008F PR BODY MASS INDEX (BMI) DOCUMENTED: ICD-10-PCS | Mod: ,,, | Performed by: NURSE PRACTITIONER

## 2023-05-08 NOTE — PROGRESS NOTES
Clinic note     Patient name: Tequila Vázquez is a 48 y.o. female   Chief compliant   Chief Complaint   Patient presents with    Follow-up     Follow up. No problems voiced today.        Subjective     History of present illness   In clinic for follow up on blood glucose readings after restarting Ozempic     Hx of DM, checking blood glucose BID with fasting readings   Last A1c was 10.3, she had been out of Ozempic for several weeks, Ozempic was restarted on 3/28/23  She reports fasting blood glucose 110-120 over the last week.   She has lost 9 # since office visit on 3/16/23  She is walking for exercise almost daily, weight is stable     Hx of right eyelid ptosis and weakness of RUE and RLE, myasthenia gravis; recently seen by [facility] neurology,  Dr Desir and ANDREA Rogers, NP-C at  neurology [facility]    Past Medical History: asthma, DM type 2, latent syphilis, myasthenia gravis, hyperlipidemia    She is scheduled with Dr Barnes and Dr Villagomez, [facility] general surgery for vastus lateralis muscle biopsy on May 31 clinic note reviewed; he needs a1c repeated before surgery, will obtain repeat a1c on May 23 to allow results to be available prior to surgery       Social History     Tobacco Use    Smoking status: Never     Passive exposure: Never    Smokeless tobacco: Former     Types: Snuff   Substance Use Topics    Alcohol use: Never    Drug use: Never       Review of patient's allergies indicates:   Allergen Reactions    Aspirin Hives       Past Medical History:   Diagnosis Date    Asthma     Diabetes mellitus     Late syphilis, 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 Added automatically from request for surgery [id number]       Past Surgical History:   Procedure Laterality Date    DILATION AND CURETTAGE OF UTERUS  2000    preformed at [facility]    HYSTERECTOMY      OOPHORECTOMY      pt has had one ovary removed, unsure  side    TUBAL LIGATION  1999    [facility]        Family History   Problem Relation Age of Onset    Asthma Mother     Coronary artery disease Mother     Diabetes Father     Heart disease Father     Cancer Sister         Breast    Breast cancer Sister          Current Outpatient Medications:     albuterol (PROVENTIL/VENTOLIN HFA) 90 mcg/actuation inhaler, Inhale 2 puffs into the lungs every 6 (six) hours as needed for Wheezing. Rescue, Disp: 18 g, Rfl: 2    ergocalciferol (ERGOCALCIFEROL) 50,000 unit Cap, Take 1 capsule (50,000 Units total) by mouth every 7 days., Disp: 8 capsule, Rfl: 0    fluticasone propionate (FLONASE) 50 mcg/actuation nasal spray, 1 spray (50 mcg total) by Each Nostril route once daily., Disp: 16 g, Rfl: 2    lovastatin (MEVACOR) 20 MG tablet, Take 20 mg by mouth., Disp: , Rfl:     metFORMIN (GLUCOPHAGE) 500 MG tablet, Take 1 tablet (500 mg total) by mouth 2 (two) times daily with meals., Disp: 180 tablet, Rfl: 0    omeprazole (PRILOSEC) 20 MG capsule, Take 1 capsule (20 mg total) by mouth once daily., Disp: 90 capsule, Rfl: 0    predniSONE (DELTASONE) 10 MG tablet, Take 1.5 tablets by mouth Daily., Disp: , Rfl:     pregabalin (LYRICA) 25 MG capsule, Take 25 mg by mouth 3 (three) times daily., Disp: , Rfl:     pyridostigmine (MESTINON) 60 mg Tab, Take 60 mg by mouth 3 (three) times daily., Disp: , Rfl:     semaglutide (OZEMPIC) 1 mg/dose (4 mg/3 mL), Inject 1 mg into the skin every 7 days., Disp: 9 mL, Rfl: 0    lancets (E-Z JECT LANCETS) 32 gauge Misc, 100 lancets by Misc.(Non-Drug; Combo Route) route. USE TO CHECK GLUCOSE ONCE DAILY, Disp: , Rfl:     topiramate (TOPAMAX) 50 MG tablet, Take 1 tablet (50 mg total) by mouth 2 (two) times daily., Disp: 180 tablet, Rfl: 0    Review of Systems   Constitutional:  Negative for appetite change, chills, fatigue, fever and unexpected weight change.   Respiratory:  Negative for cough and shortness of breath.    Cardiovascular:  Negative for chest pain,  "palpitations and leg swelling.   Gastrointestinal:  Negative for abdominal pain, change in bowel habit, constipation, diarrhea, nausea, vomiting and change in bowel habit.   Genitourinary:  Negative for dysuria and frequency.   Musculoskeletal:  Negative for arthralgias, gait problem and myalgias.   Neurological:  Negative for dizziness, syncope, light-headedness and headaches.   Psychiatric/Behavioral:  Negative for dysphoric mood and sleep disturbance. The patient is not nervous/anxious.      Objective     /74 (BP Location: Right arm, Patient Position: Sitting)   Pulse 91   Ht 5' 5" (1.651 m)   Wt 91.2 kg (201 lb)   SpO2 99%   BMI 33.45 kg/m²     Physical Exam   Constitutional: She is oriented to person, place, and time. No distress.   HENT:   Head: Atraumatic.   Mouth/Throat: Mucous membranes are moist.   Eyes:   Right eye ptosis    Cardiovascular: Normal rate and regular rhythm. Pulmonary:      Effort: Pulmonary effort is normal. No respiratory distress.      Breath sounds: Normal breath sounds. No wheezing, rhonchi or rales.     Abdominal: Soft. Bowel sounds are normal. She exhibits no distension. There is no abdominal tenderness.   Musculoskeletal:         General: Normal range of motion.      Cervical back: Neck supple.      Right lower leg: No edema.      Left lower leg: No edema.   Neurological: She is alert and oriented to person, place, and time. Gait normal.   Skin: Skin is warm and dry.   Psychiatric: Her behavior is normal. Mood normal.     Lab Results   Component Value Date    WBC 4.66 12/28/2022    HGB 13.1 12/28/2022    HCT 38.1 12/28/2022    MCV 92.9 12/28/2022     12/28/2022       CMP  Sodium   Date Value Ref Range Status   12/28/2022 144 136 - 145 mmol/L Final     Potassium   Date Value Ref Range Status   12/28/2022 4.3 3.5 - 5.1 mmol/L Final     Chloride   Date Value Ref Range Status   12/28/2022 107 98 - 107 mmol/L Final     CO2   Date Value Ref Range Status   12/28/2022 30 21 " - 32 mmol/L Final     Glucose   Date Value Ref Range Status   12/28/2022 117 (H) 74 - 106 mg/dL Final     BUN   Date Value Ref Range Status   12/28/2022 8 7 - 18 mg/dL Final     Creatinine   Date Value Ref Range Status   12/28/2022 0.84 0.55 - 1.02 mg/dL Final     Calcium   Date Value Ref Range Status   12/28/2022 8.0 (L) 8.5 - 10.1 mg/dL Final     Total Protein   Date Value Ref Range Status   12/28/2022 7.1 6.4 - 8.2 g/dL Final     Albumin   Date Value Ref Range Status   12/28/2022 3.7 3.5 - 5.0 g/dL Final     Bilirubin, Total   Date Value Ref Range Status   12/28/2022 0.4 >0.0 - 1.2 mg/dL Final     Alk Phos   Date Value Ref Range Status   12/28/2022 105 (H) 39 - 100 U/L Final     AST   Date Value Ref Range Status   12/28/2022 17 15 - 37 U/L Final     ALT   Date Value Ref Range Status   12/28/2022 24 13 - 56 U/L Final     Anion Gap   Date Value Ref Range Status   12/28/2022 11 7 - 16 mmol/L Final     eGFR    Date Value Ref Range Status   08/13/2021 67 >=60 mL/min/1.73m² Final     eGFR   Date Value Ref Range Status   04/13/2022 61 >=60 mL/min/1.73m² Final     Lab Results   Component Value Date    TSH 0.766 08/13/2021     Lab Results   Component Value Date    CHOL 216 (H) 04/13/2022    CHOL 250 (H) 04/19/2021     Lab Results   Component Value Date    HDL 58 04/13/2022    HDL 54 04/19/2021     Lab Results   Component Value Date    LDLCALC 137 04/13/2022    LDLCALC 166 04/19/2021     Lab Results   Component Value Date    TRIG 106 04/13/2022    TRIG 150 04/19/2021     Lab Results   Component Value Date    CHOLHDL 3.7 04/13/2022    CHOLHDL 4.6 04/19/2021     Lab Results   Component Value Date    HGBA1C 10.3 (H) 03/16/2023         Assessment and Plan   Type 2 diabetes mellitus without complication, without long-term current use of insulin  -     Hemoglobin A1C; Future; Expected date: 05/23/2023    Hypertension, unspecified type    BMI 33.0-33.9,adult          Patient Instructions  Patient Instructions    Repeat A1c on May 23, this will allow results to be available prior to muscle biopsy on May 31 my Dr Barnes and Dr Villagomez    No medication refills needed at this time   Continue current medications   Follow up on June 12, medication refills  will be due Caty 15      I have reviewed the encounter documentation and agree with the assessment and plan as put forth by the nurse practitioner.

## 2023-05-08 NOTE — PATIENT INSTRUCTIONS
Repeat A1c on May 23, this will allow results to be available prior to muscle biopsy on May 31 my Dr Barnes and Dr Villagomez    No medication refills needed at this time   Continue current medications   Follow up on June 12, medication refills  will be due Caty 15

## 2023-05-22 DIAGNOSIS — I10 HYPERTENSION, UNSPECIFIED TYPE: Primary | ICD-10-CM

## 2023-05-22 RX ORDER — LOVASTATIN 20 MG/1
20 TABLET ORAL DAILY
Qty: 30 TABLET | Refills: 0 | Status: SHIPPED | OUTPATIENT
Start: 2023-05-22 | End: 2023-06-23 | Stop reason: SDUPTHER

## 2023-06-09 DIAGNOSIS — Z71.89 COMPLEX CARE COORDINATION: ICD-10-CM

## 2023-06-12 ENCOUNTER — OFFICE VISIT (OUTPATIENT)
Dept: FAMILY MEDICINE | Facility: CLINIC | Age: 48
End: 2023-06-12
Payer: COMMERCIAL

## 2023-06-12 VITALS
BODY MASS INDEX: 34.62 KG/M2 | OXYGEN SATURATION: 100 % | DIASTOLIC BLOOD PRESSURE: 79 MMHG | WEIGHT: 207.81 LBS | SYSTOLIC BLOOD PRESSURE: 115 MMHG | HEART RATE: 79 BPM | HEIGHT: 65 IN | TEMPERATURE: 97 F

## 2023-06-12 DIAGNOSIS — E11.9 TYPE 2 DIABETES MELLITUS WITHOUT COMPLICATION, WITHOUT LONG-TERM CURRENT USE OF INSULIN: Primary | ICD-10-CM

## 2023-06-12 DIAGNOSIS — G43.009 MIGRAINE WITHOUT AURA AND WITHOUT STATUS MIGRAINOSUS, NOT INTRACTABLE: ICD-10-CM

## 2023-06-12 DIAGNOSIS — Z72.0 SMOKELESS TOBACCO USE: ICD-10-CM

## 2023-06-12 DIAGNOSIS — K21.9 GASTROESOPHAGEAL REFLUX DISEASE, UNSPECIFIED WHETHER ESOPHAGITIS PRESENT: ICD-10-CM

## 2023-06-12 DIAGNOSIS — E78.5 HYPERLIPIDEMIA, UNSPECIFIED HYPERLIPIDEMIA TYPE: ICD-10-CM

## 2023-06-12 DIAGNOSIS — I10 HYPERTENSION, UNSPECIFIED TYPE: ICD-10-CM

## 2023-06-12 DIAGNOSIS — J30.2 SEASONAL ALLERGIES: ICD-10-CM

## 2023-06-12 LAB
CHOLEST SERPL-MCNC: 232 MG/DL (ref 0–200)
CHOLEST/HDLC SERPL: 3.5 {RATIO}
CREAT UR-MCNC: 68 MG/DL (ref 28–219)
EST. AVERAGE GLUCOSE BLD GHB EST-MCNC: 324 MG/DL
HBA1C MFR BLD HPLC: 12.3 % (ref 4.5–6.6)
HDLC SERPL-MCNC: 67 MG/DL (ref 40–60)
LDLC SERPL CALC-MCNC: 143 MG/DL
LDLC/HDLC SERPL: 2.1 {RATIO}
MICROALBUMIN UR-MCNC: <0.5 MG/DL (ref 0–2.8)
MICROALBUMIN/CREAT RATIO PNL UR: <7.4 MG/G (ref 0–30)
NONHDLC SERPL-MCNC: 165 MG/DL
TRIGL SERPL-MCNC: 112 MG/DL (ref 35–150)
VLDLC SERPL-MCNC: 22 MG/DL

## 2023-06-12 PROCEDURE — 3078F PR MOST RECENT DIASTOLIC BLOOD PRESSURE < 80 MM HG: ICD-10-PCS | Mod: ,,, | Performed by: NURSE PRACTITIONER

## 2023-06-12 PROCEDURE — 3008F PR BODY MASS INDEX (BMI) DOCUMENTED: ICD-10-PCS | Mod: ,,, | Performed by: NURSE PRACTITIONER

## 2023-06-12 PROCEDURE — 1160F PR REVIEW ALL MEDS BY PRESCRIBER/CLIN PHARMACIST DOCUMENTED: ICD-10-PCS | Mod: ,,, | Performed by: NURSE PRACTITIONER

## 2023-06-12 PROCEDURE — 99213 PR OFFICE/OUTPT VISIT, EST, LEVL III, 20-29 MIN: ICD-10-PCS | Mod: 25,,, | Performed by: NURSE PRACTITIONER

## 2023-06-12 PROCEDURE — 3008F BODY MASS INDEX DOCD: CPT | Mod: ,,, | Performed by: NURSE PRACTITIONER

## 2023-06-12 PROCEDURE — 82043 UR ALBUMIN QUANTITATIVE: CPT | Mod: ,,, | Performed by: CLINICAL MEDICAL LABORATORY

## 2023-06-12 PROCEDURE — 99406 PR TOBACCO USE CESSATION INTERMEDIATE 3-10 MINUTES: ICD-10-PCS | Mod: ,,, | Performed by: NURSE PRACTITIONER

## 2023-06-12 PROCEDURE — 3074F SYST BP LT 130 MM HG: CPT | Mod: ,,, | Performed by: NURSE PRACTITIONER

## 2023-06-12 PROCEDURE — 3078F DIAST BP <80 MM HG: CPT | Mod: ,,, | Performed by: NURSE PRACTITIONER

## 2023-06-12 PROCEDURE — 80061 LIPID PANEL: CPT | Mod: ,,, | Performed by: CLINICAL MEDICAL LABORATORY

## 2023-06-12 PROCEDURE — 3046F HEMOGLOBIN A1C LEVEL >9.0%: CPT | Mod: ,,, | Performed by: NURSE PRACTITIONER

## 2023-06-12 PROCEDURE — 99213 OFFICE O/P EST LOW 20 MIN: CPT | Mod: 25,,, | Performed by: NURSE PRACTITIONER

## 2023-06-12 PROCEDURE — 82043 MICROALBUMIN / CREATININE RATIO URINE: ICD-10-PCS | Mod: ,,, | Performed by: CLINICAL MEDICAL LABORATORY

## 2023-06-12 PROCEDURE — 82570 MICROALBUMIN / CREATININE RATIO URINE: ICD-10-PCS | Mod: ,,, | Performed by: CLINICAL MEDICAL LABORATORY

## 2023-06-12 PROCEDURE — 83036 HEMOGLOBIN GLYCOSYLATED A1C: CPT | Mod: ,,, | Performed by: CLINICAL MEDICAL LABORATORY

## 2023-06-12 PROCEDURE — 80061 LIPID PANEL: ICD-10-PCS | Mod: ,,, | Performed by: CLINICAL MEDICAL LABORATORY

## 2023-06-12 PROCEDURE — 1160F RVW MEDS BY RX/DR IN RCRD: CPT | Mod: ,,, | Performed by: NURSE PRACTITIONER

## 2023-06-12 PROCEDURE — 83036 HEMOGLOBIN A1C: ICD-10-PCS | Mod: ,,, | Performed by: CLINICAL MEDICAL LABORATORY

## 2023-06-12 PROCEDURE — 1159F PR MEDICATION LIST DOCUMENTED IN MEDICAL RECORD: ICD-10-PCS | Mod: ,,, | Performed by: NURSE PRACTITIONER

## 2023-06-12 PROCEDURE — 3074F PR MOST RECENT SYSTOLIC BLOOD PRESSURE < 130 MM HG: ICD-10-PCS | Mod: ,,, | Performed by: NURSE PRACTITIONER

## 2023-06-12 PROCEDURE — 1159F MED LIST DOCD IN RCRD: CPT | Mod: ,,, | Performed by: NURSE PRACTITIONER

## 2023-06-12 PROCEDURE — 82570 ASSAY OF URINE CREATININE: CPT | Mod: ,,, | Performed by: CLINICAL MEDICAL LABORATORY

## 2023-06-12 PROCEDURE — 99406 BEHAV CHNG SMOKING 3-10 MIN: CPT | Mod: ,,, | Performed by: NURSE PRACTITIONER

## 2023-06-12 PROCEDURE — 3046F PR MOST RECENT HEMOGLOBIN A1C LEVEL > 9.0%: ICD-10-PCS | Mod: ,,, | Performed by: NURSE PRACTITIONER

## 2023-06-12 RX ORDER — TOPIRAMATE 50 MG/1
50 TABLET, FILM COATED ORAL 2 TIMES DAILY
Qty: 180 TABLET | Refills: 0 | Status: CANCELLED | OUTPATIENT
Start: 2023-06-12 | End: 2023-09-10

## 2023-06-12 RX ORDER — OMEPRAZOLE 20 MG/1
20 CAPSULE, DELAYED RELEASE ORAL DAILY
Qty: 90 CAPSULE | Refills: 0 | Status: SHIPPED | OUTPATIENT
Start: 2023-06-12 | End: 2023-06-20

## 2023-06-12 RX ORDER — SEMAGLUTIDE 1.34 MG/ML
1 INJECTION, SOLUTION SUBCUTANEOUS
Qty: 9 ML | Refills: 0 | Status: SHIPPED | OUTPATIENT
Start: 2023-06-12 | End: 2023-07-20 | Stop reason: SDUPTHER

## 2023-06-12 RX ORDER — FLUTICASONE PROPIONATE 50 MCG
1 SPRAY, SUSPENSION (ML) NASAL DAILY
Qty: 16 G | Refills: 2 | Status: SHIPPED | OUTPATIENT
Start: 2023-06-12 | End: 2023-09-21 | Stop reason: SDUPTHER

## 2023-06-12 RX ORDER — LOVASTATIN 20 MG/1
20 TABLET ORAL DAILY
Qty: 30 TABLET | Refills: 0 | Status: CANCELLED | OUTPATIENT
Start: 2023-06-12 | End: 2023-07-12

## 2023-06-12 RX ORDER — METFORMIN HYDROCHLORIDE 500 MG/1
500 TABLET ORAL 2 TIMES DAILY WITH MEALS
Qty: 180 TABLET | Refills: 0 | Status: SHIPPED | OUTPATIENT
Start: 2023-06-12 | End: 2023-09-21 | Stop reason: SDUPTHER

## 2023-06-12 NOTE — PROGRESS NOTES
Clinic note     Patient name: Tequila Vázquez is a 48 y.o. female   Chief compliant   Chief Complaint   Patient presents with    Follow-up     States everything is doing ok.  On 4th week of 0.25 of ozempic states she has had some nausea.        Subjective     History of present illness     In clinic for routine follow up and medication refills   Denies any acute concerns at present        Hx of DM, checking blood glucose BID with fasting readings   Last A1c was 10.3, she had been out of Ozempic for several weeks, Ozempic was restarted on 3/28/23; this will be rechecked today   She reports fasting blood glucose 100-110 over the last week.   She is walking for exercise almost daily     Hx of right eyelid ptosis and weakness of RUE and RLE, myasthenia gravis; recently seen by [facility] neurology,  Dr Desir and ANDREA Rogers, NP-C at  neurology [facility]     Past Medical History: asthma, DM type 2, latent syphilis, myasthenia gravis, hyperlipidemia    She had partial vastus lateralis muscle biopsy on May 31 but is scheduled to go back for further biopsy on June 22, Dr Barnes and Dr Villagomez, [facility] general surgery               Social History     Tobacco Use    Smoking status: Never     Passive exposure: Never    Smokeless tobacco: Current     Types: Snuff   Substance Use Topics    Alcohol use: Never    Drug use: Never       Review of patient's allergies indicates:   Allergen Reactions    Aspirin Hives       Past Medical History:   Diagnosis Date    Asthma     Diabetes mellitus     Late syphilis, 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 Added automatically from request for surgery [id number]       Past Surgical History:   Procedure Laterality Date    DILATION AND CURETTAGE OF UTERUS  2000    preformed at [facility]    HYSTERECTOMY      OOPHORECTOMY      pt has had one ovary removed, unsure side    TUBAL LIGATION  1999    [facility]        Family History   Problem Relation Age of Onset    Asthma Mother     Coronary artery disease Mother     Diabetes Father     Heart disease Father     Cancer Sister         Breast    Breast cancer Sister          Current Outpatient Medications:     albuterol (PROVENTIL/VENTOLIN HFA) 90 mcg/actuation inhaler, Inhale 2 puffs into the lungs every 6 (six) hours as needed for Wheezing. Rescue, Disp: 18 g, Rfl: 2    lancets (E-Z JECT LANCETS) 32 gauge Misc, 100 lancets by Misc.(Non-Drug; Combo Route) route. USE TO CHECK GLUCOSE ONCE DAILY, Disp: , Rfl:     lovastatin (MEVACOR) 20 MG tablet, Take 1 tablet (20 mg total) by mouth once daily., Disp: 30 tablet, Rfl: 0    predniSONE (DELTASONE) 10 MG tablet, Take 1.5 tablets by mouth Daily., Disp: , Rfl:     pregabalin (LYRICA) 25 MG capsule, Take 25 mg by mouth 3 (three) times daily., Disp: , Rfl:     pyridostigmine (MESTINON) 60 mg Tab, Take 60 mg by mouth 3 (three) times daily., Disp: , Rfl:     ergocalciferol (ERGOCALCIFEROL) 50,000 unit Cap, Take 1 capsule (50,000 Units total) by mouth every 7 days. (Patient not taking: Reported on 6/12/2023), Disp: 8 capsule, Rfl: 0    fluticasone propionate (FLONASE) 50 mcg/actuation nasal spray, 1 spray (50 mcg total) by Each Nostril route once daily., Disp: 16 g, Rfl: 2    metFORMIN (GLUCOPHAGE) 500 MG tablet, Take 1 tablet (500 mg total) by mouth 2 (two) times daily with meals., Disp: 180 tablet, Rfl: 0    omeprazole (PRILOSEC) 20 MG capsule, Take 1 capsule (20 mg total) by mouth once daily., Disp: 90 capsule, Rfl: 0    semaglutide (OZEMPIC) 1 mg/dose (4 mg/3 mL), Inject 1 mg into the skin every 7 days., Disp: 9 mL, Rfl: 0    topiramate (TOPAMAX) 50 MG tablet, Take 1 tablet (50 mg total) by mouth 2 (two) times daily., Disp: 180 tablet, Rfl: 0    Review of Systems   Constitutional:  Negative for appetite change and unexpected weight change.   Respiratory:  Negative for shortness of breath.    Cardiovascular:  Negative for  "palpitations and leg swelling.   Gastrointestinal:  Negative for constipation and diarrhea.   Genitourinary:  Negative for dysuria and frequency.   Musculoskeletal:  Negative for gait problem.   Neurological:  Negative for dizziness, syncope and light-headedness.   Psychiatric/Behavioral:  Negative for dysphoric mood and sleep disturbance. The patient is not nervous/anxious.      Objective     /79   Pulse 79   Temp 97 °F (36.1 °C)   Ht 5' 5" (1.651 m)   Wt 94.3 kg (207 lb 12.8 oz)   SpO2 100%   BMI 34.58 kg/m²     Physical Exam   Constitutional: She is oriented to person, place, and time. No distress.   HENT:   Head: Atraumatic.   Mouth/Throat: Mucous membranes are moist.   Eyes:   Right eye ptosis    Cardiovascular: Normal rate and regular rhythm. Pulmonary:      Effort: Pulmonary effort is normal. No respiratory distress.      Breath sounds: Normal breath sounds. No wheezing, rhonchi or rales.     Abdominal: Soft. Bowel sounds are normal. She exhibits no distension. There is no abdominal tenderness.   Musculoskeletal:         General: Normal range of motion.      Cervical back: Neck supple.      Right lower leg: No edema.      Left lower leg: No edema.   Neurological: She is alert and oriented to person, place, and time. Gait normal.   Skin: Skin is warm and dry.   Psychiatric: Her behavior is normal. Mood normal.     Lab Results   Component Value Date    WBC 4.66 12/28/2022    HGB 13.1 12/28/2022    HCT 38.1 12/28/2022    MCV 92.9 12/28/2022     12/28/2022       CMP  Sodium   Date Value Ref Range Status   12/28/2022 144 136 - 145 mmol/L Final     Potassium   Date Value Ref Range Status   12/28/2022 4.3 3.5 - 5.1 mmol/L Final     Chloride   Date Value Ref Range Status   12/28/2022 107 98 - 107 mmol/L Final     CO2   Date Value Ref Range Status   12/28/2022 30 21 - 32 mmol/L Final     Glucose   Date Value Ref Range Status   12/28/2022 117 (H) 74 - 106 mg/dL Final     BUN   Date Value Ref Range " Status   12/28/2022 8 7 - 18 mg/dL Final     Creatinine   Date Value Ref Range Status   12/28/2022 0.84 0.55 - 1.02 mg/dL Final     Calcium   Date Value Ref Range Status   12/28/2022 8.0 (L) 8.5 - 10.1 mg/dL Final     Total Protein   Date Value Ref Range Status   12/28/2022 7.1 6.4 - 8.2 g/dL Final     Albumin   Date Value Ref Range Status   12/28/2022 3.7 3.5 - 5.0 g/dL Final     Bilirubin, Total   Date Value Ref Range Status   12/28/2022 0.4 >0.0 - 1.2 mg/dL Final     Alk Phos   Date Value Ref Range Status   12/28/2022 105 (H) 39 - 100 U/L Final     AST   Date Value Ref Range Status   12/28/2022 17 15 - 37 U/L Final     ALT   Date Value Ref Range Status   12/28/2022 24 13 - 56 U/L Final     Anion Gap   Date Value Ref Range Status   12/28/2022 11 7 - 16 mmol/L Final     eGFR    Date Value Ref Range Status   08/13/2021 67 >=60 mL/min/1.73m² Final     eGFR   Date Value Ref Range Status   04/13/2022 61 >=60 mL/min/1.73m² Final     Lab Results   Component Value Date    TSH 0.766 08/13/2021     Lab Results   Component Value Date    CHOL 216 (H) 04/13/2022    CHOL 250 (H) 04/19/2021     Lab Results   Component Value Date    HDL 58 04/13/2022    HDL 54 04/19/2021     Lab Results   Component Value Date    LDLCALC 137 04/13/2022    LDLCALC 166 04/19/2021     Lab Results   Component Value Date    TRIG 106 04/13/2022    TRIG 150 04/19/2021     Lab Results   Component Value Date    CHOLHDL 3.7 04/13/2022    CHOLHDL 4.6 04/19/2021     Lab Results   Component Value Date    HGBA1C 10.3 (H) 03/16/2023         Assessment and Plan   Type 2 diabetes mellitus without complication, without long-term current use of insulin  -     metFORMIN (GLUCOPHAGE) 500 MG tablet; Take 1 tablet (500 mg total) by mouth 2 (two) times daily with meals.  Dispense: 180 tablet; Refill: 0  -     semaglutide (OZEMPIC) 1 mg/dose (4 mg/3 mL); Inject 1 mg into the skin every 7 days.  Dispense: 9 mL; Refill: 0  -     Microalbumin/Creatinine  Ratio, Urine  -     Hemoglobin A1C; Future; Expected date: 06/12/2023    Hyperlipidemia, unspecified hyperlipidemia type  -     Lipid Panel; Future; Expected date: 06/12/2023    Hypertension, unspecified type    Seasonal allergies  -     fluticasone propionate (FLONASE) 50 mcg/actuation nasal spray; 1 spray (50 mcg total) by Each Nostril route once daily.  Dispense: 16 g; Refill: 2    Gastroesophageal reflux disease, unspecified whether esophagitis present  -     omeprazole (PRILOSEC) 20 MG capsule; Take 1 capsule (20 mg total) by mouth once daily.  Dispense: 90 capsule; Refill: 0    Migraine without aura and without status migrainosus, not intractable    Smokeless tobacco use    Assistance with smokeless tobacco cessation was offered, including:  []  Medications  [x]  Counseling  [x]  Printed Information on Tobacco use Cessation  []  Referral to a Tobacco Cessation Program    Patient was counseled regarding the use of smokeless tobacco for 3-10 minutes.        Patient Instructions  Patient Instructions   Lab obtained in clinic today, we will notify you of results and any necessary changes to plan of care   Refills on routine medications   Follow up in three months and as needed

## 2023-06-13 ENCOUNTER — OFFICE VISIT (OUTPATIENT)
Dept: OBSTETRICS AND GYNECOLOGY | Facility: CLINIC | Age: 48
End: 2023-06-13
Payer: COMMERCIAL

## 2023-06-13 VITALS
HEIGHT: 65 IN | BODY MASS INDEX: 34.16 KG/M2 | WEIGHT: 205 LBS | OXYGEN SATURATION: 100 % | HEART RATE: 85 BPM | DIASTOLIC BLOOD PRESSURE: 63 MMHG | TEMPERATURE: 98 F | SYSTOLIC BLOOD PRESSURE: 123 MMHG

## 2023-06-13 DIAGNOSIS — B96.89 BACTERIAL VAGINOSIS: Primary | ICD-10-CM

## 2023-06-13 DIAGNOSIS — E11.9 TYPE 2 DIABETES MELLITUS WITHOUT COMPLICATION, WITHOUT LONG-TERM CURRENT USE OF INSULIN: ICD-10-CM

## 2023-06-13 DIAGNOSIS — Z72.51 HIGH RISK HETEROSEXUAL BEHAVIOR: ICD-10-CM

## 2023-06-13 DIAGNOSIS — Z01.419 VISIT FOR GYNECOLOGIC EXAMINATION: Primary | ICD-10-CM

## 2023-06-13 DIAGNOSIS — Z12.31 ENCOUNTER FOR SCREENING MAMMOGRAM FOR MALIGNANT NEOPLASM OF BREAST: ICD-10-CM

## 2023-06-13 DIAGNOSIS — N76.0 BACTERIAL VAGINOSIS: Primary | ICD-10-CM

## 2023-06-13 LAB
CANDIDA SPECIES: NEGATIVE
GARDNERELLA: POSITIVE
TRICHOMONAS: NEGATIVE

## 2023-06-13 PROCEDURE — 1159F MED LIST DOCD IN RCRD: CPT | Mod: ,,, | Performed by: ADVANCED PRACTICE MIDWIFE

## 2023-06-13 PROCEDURE — G0101 CA SCREEN;PELVIC/BREAST EXAM: HCPCS | Mod: GZ,,, | Performed by: ADVANCED PRACTICE MIDWIFE

## 2023-06-13 PROCEDURE — 87591 CHLAMYDIA/GONORRHOEAE(GC), PCR: ICD-10-PCS | Mod: ,,, | Performed by: CLINICAL MEDICAL LABORATORY

## 2023-06-13 PROCEDURE — 87510 BACTERIAL VAGINOSIS: ICD-10-PCS | Mod: ,,, | Performed by: CLINICAL MEDICAL LABORATORY

## 2023-06-13 PROCEDURE — 87510 GARDNER VAG DNA DIR PROBE: CPT | Mod: ,,, | Performed by: CLINICAL MEDICAL LABORATORY

## 2023-06-13 PROCEDURE — 3066F PR DOCUMENTATION OF TREATMENT FOR NEPHROPATHY: ICD-10-PCS | Mod: ,,, | Performed by: ADVANCED PRACTICE MIDWIFE

## 2023-06-13 PROCEDURE — 87480 CANDIDA DNA DIR PROBE: CPT | Mod: ,,, | Performed by: CLINICAL MEDICAL LABORATORY

## 2023-06-13 PROCEDURE — 87591 N.GONORRHOEAE DNA AMP PROB: CPT | Mod: ,,, | Performed by: CLINICAL MEDICAL LABORATORY

## 2023-06-13 PROCEDURE — 87660 BACTERIAL VAGINOSIS: ICD-10-PCS | Mod: ,,, | Performed by: CLINICAL MEDICAL LABORATORY

## 2023-06-13 PROCEDURE — 87491 CHLMYD TRACH DNA AMP PROBE: CPT | Mod: ,,, | Performed by: CLINICAL MEDICAL LABORATORY

## 2023-06-13 PROCEDURE — 3078F DIAST BP <80 MM HG: CPT | Mod: ,,, | Performed by: ADVANCED PRACTICE MIDWIFE

## 2023-06-13 PROCEDURE — 3061F NEG MICROALBUMINURIA REV: CPT | Mod: ,,, | Performed by: ADVANCED PRACTICE MIDWIFE

## 2023-06-13 PROCEDURE — 87660 TRICHOMONAS VAGIN DIR PROBE: CPT | Mod: ,,, | Performed by: CLINICAL MEDICAL LABORATORY

## 2023-06-13 PROCEDURE — 3074F SYST BP LT 130 MM HG: CPT | Mod: ,,, | Performed by: ADVANCED PRACTICE MIDWIFE

## 2023-06-13 PROCEDURE — 3046F HEMOGLOBIN A1C LEVEL >9.0%: CPT | Mod: ,,, | Performed by: ADVANCED PRACTICE MIDWIFE

## 2023-06-13 PROCEDURE — 3074F PR MOST RECENT SYSTOLIC BLOOD PRESSURE < 130 MM HG: ICD-10-PCS | Mod: ,,, | Performed by: ADVANCED PRACTICE MIDWIFE

## 2023-06-13 PROCEDURE — 3008F PR BODY MASS INDEX (BMI) DOCUMENTED: ICD-10-PCS | Mod: ,,, | Performed by: ADVANCED PRACTICE MIDWIFE

## 2023-06-13 PROCEDURE — 1159F PR MEDICATION LIST DOCUMENTED IN MEDICAL RECORD: ICD-10-PCS | Mod: ,,, | Performed by: ADVANCED PRACTICE MIDWIFE

## 2023-06-13 PROCEDURE — 3046F PR MOST RECENT HEMOGLOBIN A1C LEVEL > 9.0%: ICD-10-PCS | Mod: ,,, | Performed by: ADVANCED PRACTICE MIDWIFE

## 2023-06-13 PROCEDURE — 3078F PR MOST RECENT DIASTOLIC BLOOD PRESSURE < 80 MM HG: ICD-10-PCS | Mod: ,,, | Performed by: ADVANCED PRACTICE MIDWIFE

## 2023-06-13 PROCEDURE — 87491 CHLAMYDIA/GONORRHOEAE(GC), PCR: ICD-10-PCS | Mod: ,,, | Performed by: CLINICAL MEDICAL LABORATORY

## 2023-06-13 PROCEDURE — 3061F PR NEG MICROALBUMINURIA RESULT DOCUMENTED/REVIEW: ICD-10-PCS | Mod: ,,, | Performed by: ADVANCED PRACTICE MIDWIFE

## 2023-06-13 PROCEDURE — 3066F NEPHROPATHY DOC TX: CPT | Mod: ,,, | Performed by: ADVANCED PRACTICE MIDWIFE

## 2023-06-13 PROCEDURE — 87480 BACTERIAL VAGINOSIS: ICD-10-PCS | Mod: ,,, | Performed by: CLINICAL MEDICAL LABORATORY

## 2023-06-13 PROCEDURE — 3008F BODY MASS INDEX DOCD: CPT | Mod: ,,, | Performed by: ADVANCED PRACTICE MIDWIFE

## 2023-06-13 PROCEDURE — G0101 PR CA SCREEN;PELVIC/BREAST EXAM: ICD-10-PCS | Mod: GZ,,, | Performed by: ADVANCED PRACTICE MIDWIFE

## 2023-06-13 RX ORDER — METRONIDAZOLE 500 MG/1
500 TABLET ORAL 2 TIMES DAILY
Qty: 14 TABLET | Refills: 0 | Status: SHIPPED | OUTPATIENT
Start: 2023-06-13 | End: 2023-06-20

## 2023-06-13 NOTE — PROGRESS NOTES
Subjective:      Patient ID: Tequila Vázquez is a 48 y.o. female.    Chief Complaint:  Annual Exam (Last pap: unknown /Last mammo: 2022)      History of Present Illness  Ms Vázquez is here for gyn exam.  She has had a hysterectomy and does not need a pap.  She denies any hot flashes.  She is sexually active. She want testing for infections.  She see MEENA Worley for diabetes management and goes to [facility] for treatment of myasthenia gravis Her HbgAIC was 12 yesterday.   She has been drinking fruit punch. I recommended she switch to water or Crystal Light.  Annual Exam-Postmenopausal  Patient presents for annual exam. The patient has no complaints today. The patient is sexually active. GYN screening history: last pap: was normal. The patient is not taking hormone replacement therapy. Patient denies post-menopausal vaginal bleeding. She denies hot flashes  The patient wears seatbelts: no. The patient participates in regular exercise: no. Has the patient ever been transfused or tattooed?: yes. The patient reports that there is not domestic violence in her life.    GYN & OB History  No LMP recorded. Patient has had a hysterectomy.   Date of Last Pap: - normal    OB History    Para Term  AB Living   5 5 5     5   SAB IAB Ectopic Multiple Live Births           5      # Outcome Date GA Lbr Jacek/2nd Weight Sex Delivery Anes PTL Lv   5 Term            4 Term            3 Term            2 Term            1 Term                Review of Systems  Review of Systems   Constitutional:  Positive for fatigue and unexpected weight change.   HENT: Negative.     Respiratory: Negative.     Cardiovascular: Negative.    Gastrointestinal: Negative.    Endocrine: Positive for polyuria.   Genitourinary: Negative.    Musculoskeletal:  Positive for myalgias.   Skin: Negative.    Neurological: Negative.    Psychiatric/Behavioral: Negative.        Objective:    Physical Exam   Constitutional: She is oriented to person,  place, and time. She appears well-developed.   HENT:   Head: Normocephalic.   Nose: Nose normal.   Cardiovascular: Normal rate.   Pulmonary/Chest: Effort normal. She exhibits no mass, no tenderness, no laceration, no deformity and no retraction. Right breast exhibits no inverted nipple, no mass, no nipple discharge, no skin change and no tenderness. Left breast exhibits no inverted nipple, no mass, no nipple discharge, no skin change and no tenderness.   Genitourinary: Pelvic exam was performed with patient supine. Bartholins normal and vaginal cuff normal. Right labia normal and left labia normal. Right adnexum displays no mass and no tenderness. Left adnexum displays no mass and no tenderness. Uterus is absent.   Neurological: She is alert and oriented to person, place, and time.   Skin: Skin is warm and dry.   Psychiatric: She has a normal mood and affect.   Nursing note and vitals reviewed.     Assessment:     1. Visit for gynecologic examination    2. Encounter for screening mammogram for malignant neoplasm of breast    3. High risk heterosexual behavior    4. Type 2 diabetes mellitus without complication, without long-term current use of insulin          Plan:   Schedule mammogram  Use a condom and vaginal lubricant when she as sex  Call result of testing  Diet  to follow diabetic diet and f/u with MEENA Worley

## 2023-06-14 ENCOUNTER — TELEPHONE (OUTPATIENT)
Dept: OBSTETRICS AND GYNECOLOGY | Facility: CLINIC | Age: 48
End: 2023-06-14
Payer: COMMERCIAL

## 2023-06-14 ENCOUNTER — TELEPHONE (OUTPATIENT)
Dept: FAMILY MEDICINE | Facility: CLINIC | Age: 48
End: 2023-06-14
Payer: COMMERCIAL

## 2023-06-14 LAB
CHLAMYDIA BY PCR: NEGATIVE
N. GONORRHOEAE (GC) BY PCR: NEGATIVE

## 2023-06-14 NOTE — TELEPHONE ENCOUNTER
----- Message from Nighat Stahl CNM sent at 6/14/2023 10:50 AM CDT -----  Review with pt.as neg.GC/CT

## 2023-06-14 NOTE — TELEPHONE ENCOUNTER
----- Message from Nighat Stahl CNM sent at 6/13/2023  4:47 PM CDT -----  Review with pt.as CLAY and I sent Flagyl

## 2023-06-20 ENCOUNTER — OFFICE VISIT (OUTPATIENT)
Dept: FAMILY MEDICINE | Facility: CLINIC | Age: 48
End: 2023-06-20
Payer: COMMERCIAL

## 2023-06-20 VITALS
OXYGEN SATURATION: 98 % | BODY MASS INDEX: 33.84 KG/M2 | SYSTOLIC BLOOD PRESSURE: 104 MMHG | HEART RATE: 89 BPM | HEIGHT: 65 IN | WEIGHT: 203.13 LBS | DIASTOLIC BLOOD PRESSURE: 71 MMHG

## 2023-06-20 DIAGNOSIS — I10 HYPERTENSION, UNSPECIFIED TYPE: ICD-10-CM

## 2023-06-20 DIAGNOSIS — G73.7 MYOPATHY IN DISEASES CLASSIFIED ELSEWHERE: ICD-10-CM

## 2023-06-20 DIAGNOSIS — E11.9 TYPE 2 DIABETES MELLITUS WITHOUT COMPLICATION, WITHOUT LONG-TERM CURRENT USE OF INSULIN: Primary | ICD-10-CM

## 2023-06-20 DIAGNOSIS — E78.5 HYPERLIPIDEMIA, UNSPECIFIED HYPERLIPIDEMIA TYPE: ICD-10-CM

## 2023-06-20 DIAGNOSIS — Z72.0 SMOKELESS TOBACCO USE: ICD-10-CM

## 2023-06-20 LAB — GLUCOSE SERPL-MCNC: 341 MG/DL (ref 70–110)

## 2023-06-20 PROCEDURE — 1160F RVW MEDS BY RX/DR IN RCRD: CPT | Mod: ,,, | Performed by: NURSE PRACTITIONER

## 2023-06-20 PROCEDURE — 3008F BODY MASS INDEX DOCD: CPT | Mod: ,,, | Performed by: NURSE PRACTITIONER

## 2023-06-20 PROCEDURE — 3066F NEPHROPATHY DOC TX: CPT | Mod: ,,, | Performed by: NURSE PRACTITIONER

## 2023-06-20 PROCEDURE — 3074F PR MOST RECENT SYSTOLIC BLOOD PRESSURE < 130 MM HG: ICD-10-PCS | Mod: ,,, | Performed by: NURSE PRACTITIONER

## 2023-06-20 PROCEDURE — 82962 GLUCOSE BLOOD TEST: CPT | Mod: ,,, | Performed by: NURSE PRACTITIONER

## 2023-06-20 PROCEDURE — 3061F PR NEG MICROALBUMINURIA RESULT DOCUMENTED/REVIEW: ICD-10-PCS | Mod: ,,, | Performed by: NURSE PRACTITIONER

## 2023-06-20 PROCEDURE — 3078F DIAST BP <80 MM HG: CPT | Mod: ,,, | Performed by: NURSE PRACTITIONER

## 2023-06-20 PROCEDURE — 99213 PR OFFICE/OUTPT VISIT, EST, LEVL III, 20-29 MIN: ICD-10-PCS | Mod: ,,, | Performed by: NURSE PRACTITIONER

## 2023-06-20 PROCEDURE — 99213 OFFICE O/P EST LOW 20 MIN: CPT | Mod: ,,, | Performed by: NURSE PRACTITIONER

## 2023-06-20 PROCEDURE — 3066F PR DOCUMENTATION OF TREATMENT FOR NEPHROPATHY: ICD-10-PCS | Mod: ,,, | Performed by: NURSE PRACTITIONER

## 2023-06-20 PROCEDURE — 3074F SYST BP LT 130 MM HG: CPT | Mod: ,,, | Performed by: NURSE PRACTITIONER

## 2023-06-20 PROCEDURE — 1159F PR MEDICATION LIST DOCUMENTED IN MEDICAL RECORD: ICD-10-PCS | Mod: ,,, | Performed by: NURSE PRACTITIONER

## 2023-06-20 PROCEDURE — 3046F HEMOGLOBIN A1C LEVEL >9.0%: CPT | Mod: ,,, | Performed by: NURSE PRACTITIONER

## 2023-06-20 PROCEDURE — 1160F PR REVIEW ALL MEDS BY PRESCRIBER/CLIN PHARMACIST DOCUMENTED: ICD-10-PCS | Mod: ,,, | Performed by: NURSE PRACTITIONER

## 2023-06-20 PROCEDURE — 1159F MED LIST DOCD IN RCRD: CPT | Mod: ,,, | Performed by: NURSE PRACTITIONER

## 2023-06-20 PROCEDURE — 3046F PR MOST RECENT HEMOGLOBIN A1C LEVEL > 9.0%: ICD-10-PCS | Mod: ,,, | Performed by: NURSE PRACTITIONER

## 2023-06-20 PROCEDURE — 82962 POCT GLUCOSE, HAND-HELD DEVICE: ICD-10-PCS | Mod: ,,, | Performed by: NURSE PRACTITIONER

## 2023-06-20 PROCEDURE — 3008F PR BODY MASS INDEX (BMI) DOCUMENTED: ICD-10-PCS | Mod: ,,, | Performed by: NURSE PRACTITIONER

## 2023-06-20 PROCEDURE — 3061F NEG MICROALBUMINURIA REV: CPT | Mod: ,,, | Performed by: NURSE PRACTITIONER

## 2023-06-20 PROCEDURE — 3078F PR MOST RECENT DIASTOLIC BLOOD PRESSURE < 80 MM HG: ICD-10-PCS | Mod: ,,, | Performed by: NURSE PRACTITIONER

## 2023-06-20 RX ORDER — FLASH GLUCOSE SENSOR
KIT MISCELLANEOUS
Qty: 6 KIT | Refills: 3 | Status: CANCELLED | OUTPATIENT
Start: 2023-06-20

## 2023-06-20 RX ORDER — FLASH GLUCOSE SCANNING READER
EACH MISCELLANEOUS
Qty: 1 EACH | Refills: 0 | Status: CANCELLED | OUTPATIENT
Start: 2023-06-20

## 2023-06-20 RX ORDER — DAPAGLIFLOZIN 10 MG/1
10 TABLET, FILM COATED ORAL DAILY
Qty: 90 TABLET | Refills: 0 | Status: SHIPPED | OUTPATIENT
Start: 2023-06-20 | End: 2023-07-20

## 2023-06-20 NOTE — PATIENT INSTRUCTIONS
Continue current medication   Start Farxiga 10 mg daily, call clinic with any questions or concerns         Pt is advised to monitor and document glucose fasting when you wake up before you eat and 2 hours after meal and bring bg log in to follow up visit in one month     Ensure to take medications as directed.      Follow diabetic diet as directed.      Work to achieve normal body weight.     Ensure to exercise 4-5 times per week for 20 minutes.     Printed diabetic diet recommendations, plate method

## 2023-06-20 NOTE — PROGRESS NOTES
Clinic note     Patient name: Teqiula Vázquez is a 48 y.o. female   Chief compliant   Chief Complaint   Patient presents with    Diabetes     Here for the follow up on a1c.  Would like to discuss CGM.        Subjective     History of present illness   In clinic for follow up on blood glucose readings  Hx of DM, checking blood glucose BID; she is requesting Rx for daina cgm and has paperwork with her today for this ; discussed that insurance with likely not cover the cgm understanding verbalized   Last A1c was 12.3, accucheck in clinic 341, she had cheerios for breakfast this morning   Taking metformin 500 BID, cannot tolerated increase in this dose r/t it causing significant GI symptoms  Will continue current medication and start Farxiga 10 mg daily, dosing and possible side effects discussed, symptoms for which to contact clinic for RTC discussed, understanding verbalized       She is walking for exercise almost daily, she is trying to follow diet recommendations, drinking only water and crystal lite   She is on prednisone 15 mg daily per neurology [facility]      Hx of right eyelid ptosis and weakness of RUE and RLE, myasthenia gravis; recently seen by [facility] neurology,  Dr Desir and ANDREA Rogers, NP-C at  neurology [facility]     Past Medical History: asthma, DM type 2, latent syphilis, myasthenia gravis, hyperlipidemia     She had partial vastus lateralis muscle biopsy on May 31 but is scheduled to go back for further biopsy on June 22, Dr Barnes and Dr Villagomez, [facility] general surgery         Social History     Tobacco Use    Smoking status: Never     Passive exposure: Never    Smokeless tobacco: Current     Types: Snuff   Substance Use Topics    Alcohol use: Never    Drug use: Never       Review of patient's allergies indicates:   Allergen Reactions    Aspirin Hives       Past Medical History:   Diagnosis Date    Asthma     Diabetes mellitus     Late syphilis, latent 6/21/2021    Myasthenia gravis     Proximal muscle  weakness 4/6/2018    Formatting of this note might be different from the original. Added automatically from request for surgery [id number]       Past Surgical History:   Procedure Laterality Date    DILATION AND CURETTAGE OF UTERUS  2000    preformed at [facility]    HYSTERECTOMY      OOPHORECTOMY      pt has had one ovary removed, unsure side    TUBAL LIGATION  1999     [facility]        Family History   Problem Relation Age of Onset    Asthma Mother     Coronary artery disease Mother     Diabetes Father     Heart disease Father     Cancer Sister         Breast    Breast cancer Sister          Current Outpatient Medications:     albuterol (PROVENTIL/VENTOLIN HFA) 90 mcg/actuation inhaler, Inhale 2 puffs into the lungs every 6 (six) hours as needed for Wheezing. Rescue, Disp: 18 g, Rfl: 2    fluticasone propionate (FLONASE) 50 mcg/actuation nasal spray, 1 spray (50 mcg total) by Each Nostril route once daily., Disp: 16 g, Rfl: 2    lancets (E-Z JECT LANCETS) 32 gauge Misc, 100 lancets by Misc.(Non-Drug; Combo Route) route. USE TO CHECK GLUCOSE ONCE DAILY, Disp: , Rfl:     lovastatin (MEVACOR) 20 MG tablet, Take 1 tablet (20 mg total) by mouth once daily., Disp: 30 tablet, Rfl: 0    metFORMIN (GLUCOPHAGE) 500 MG tablet, Take 1 tablet (500 mg total) by mouth 2 (two) times daily with meals., Disp: 180 tablet, Rfl: 0    metroNIDAZOLE (FLAGYL) 500 MG tablet, Take 1 tablet (500 mg total) by mouth 2 (two) times a day. for 7 days, Disp: 14 tablet, Rfl: 0    predniSONE (DELTASONE) 10 MG tablet, Take 1.5 tablets by mouth Daily., Disp: , Rfl:     pregabalin (LYRICA) 25 MG capsule, Take 25 mg by mouth 3 (three) times daily., Disp: , Rfl:     pyridostigmine (MESTINON) 60 mg Tab, Take 60 mg by mouth 3 (three) times daily., Disp: , Rfl:     semaglutide (OZEMPIC) 1 mg/dose (4 mg/3 mL), Inject 1 mg into the skin every 7 days., Disp: 9 mL, Rfl: 0    dapagliflozin propanediol (FARXIGA) 10 mg tablet, Take 1 tablet (10 mg total) by mouth  "once daily., Disp: 90 tablet, Rfl: 0    Review of Systems   Constitutional:  Negative for appetite change, chills, fatigue, fever and unexpected weight change.   Respiratory:  Negative for cough and shortness of breath.    Cardiovascular:  Negative for chest pain, palpitations and leg swelling.   Gastrointestinal:  Negative for abdominal pain, change in bowel habit, constipation, diarrhea, nausea, vomiting and change in bowel habit.   Genitourinary:  Negative for dysuria and frequency.   Musculoskeletal:  Negative for arthralgias, gait problem and myalgias.   Neurological:  Negative for dizziness, syncope, light-headedness and headaches.   Psychiatric/Behavioral:  Negative for dysphoric mood and sleep disturbance. The patient is not nervous/anxious.      Objective     /71   Pulse 89   Ht 5' 5" (1.651 m)   Wt 92.1 kg (203 lb 1.6 oz)   SpO2 98%   BMI 33.80 kg/m²     Physical Exam   Constitutional: She is oriented to person, place, and time. No distress.   HENT:   Head: Atraumatic.   Mouth/Throat: Mucous membranes are moist.   Eyes:   Right eye ptosis   Cardiovascular: Normal rate and regular rhythm. Pulmonary:      Effort: Pulmonary effort is normal. No respiratory distress.      Breath sounds: Normal breath sounds. No wheezing, rhonchi or rales.     Abdominal: Soft. Bowel sounds are normal. She exhibits no distension. There is no abdominal tenderness.   Musculoskeletal:         General: Normal range of motion.      Cervical back: Neck supple.      Right lower leg: No edema.      Left lower leg: No edema.   Neurological: She is alert and oriented to person, place, and time. Gait normal.   Skin: Skin is warm and dry.   Psychiatric: Her behavior is normal. Mood normal.     Lab Results   Component Value Date    WBC 4.66 12/28/2022    HGB 13.1 12/28/2022    HCT 38.1 12/28/2022    MCV 92.9 12/28/2022     12/28/2022       CMP  Sodium   Date Value Ref Range Status   12/28/2022 144 136 - 145 mmol/L Final "     Potassium   Date Value Ref Range Status   12/28/2022 4.3 3.5 - 5.1 mmol/L Final     Chloride   Date Value Ref Range Status   12/28/2022 107 98 - 107 mmol/L Final     CO2   Date Value Ref Range Status   12/28/2022 30 21 - 32 mmol/L Final     Glucose   Date Value Ref Range Status   12/28/2022 117 (H) 74 - 106 mg/dL Final     BUN   Date Value Ref Range Status   12/28/2022 8 7 - 18 mg/dL Final     Creatinine   Date Value Ref Range Status   12/28/2022 0.84 0.55 - 1.02 mg/dL Final     Calcium   Date Value Ref Range Status   12/28/2022 8.0 (L) 8.5 - 10.1 mg/dL Final     Total Protein   Date Value Ref Range Status   12/28/2022 7.1 6.4 - 8.2 g/dL Final     Albumin   Date Value Ref Range Status   12/28/2022 3.7 3.5 - 5.0 g/dL Final     Bilirubin, Total   Date Value Ref Range Status   12/28/2022 0.4 >0.0 - 1.2 mg/dL Final     Alk Phos   Date Value Ref Range Status   12/28/2022 105 (H) 39 - 100 U/L Final     AST   Date Value Ref Range Status   12/28/2022 17 15 - 37 U/L Final     ALT   Date Value Ref Range Status   12/28/2022 24 13 - 56 U/L Final     Anion Gap   Date Value Ref Range Status   12/28/2022 11 7 - 16 mmol/L Final     eGFR    Date Value Ref Range Status   08/13/2021 67 >=60 mL/min/1.73m² Final     eGFR   Date Value Ref Range Status   04/13/2022 61 >=60 mL/min/1.73m² Final     Lab Results   Component Value Date    TSH 0.766 08/13/2021     Lab Results   Component Value Date    CHOL 232 (H) 06/12/2023    CHOL 216 (H) 04/13/2022    CHOL 250 (H) 04/19/2021     Lab Results   Component Value Date    HDL 67 (H) 06/12/2023    HDL 58 04/13/2022    HDL 54 04/19/2021     Lab Results   Component Value Date    LDLCALC 143 06/12/2023    LDLCALC 137 04/13/2022    LDLCALC 166 04/19/2021     Lab Results   Component Value Date    TRIG 112 06/12/2023    TRIG 106 04/13/2022    TRIG 150 04/19/2021     Lab Results   Component Value Date    CHOLHDL 3.5 06/12/2023    CHOLHDL 3.7 04/13/2022    CHOLHDL 4.6 04/19/2021     Lab  Results   Component Value Date    HGBA1C 12.3 (H) 06/12/2023         Assessment and Plan   Type 2 diabetes mellitus without complication, without long-term current use of insulin  -     POCT Glucose, Hand-Held Device  -     dapagliflozin propanediol (FARXIGA) 10 mg tablet; Take 1 tablet (10 mg total) by mouth once daily.  Dispense: 90 tablet; Refill: 0    Hyperlipidemia, unspecified hyperlipidemia type    Hypertension, unspecified type    Smokeless tobacco use    Myopathy in diseases classified elsewhere          Patient Instructions  Patient Instructions   Continue current medication   Start Farxiga 10 mg daily, call clinic with any questions or concerns         Pt is advised to monitor and document glucose fasting when you wake up before you eat and 2 hours after meal and bring bg log in to follow up visit in one month     Ensure to take medications as directed.      Follow diabetic diet as directed.      Work to achieve normal body weight.     Ensure to exercise 4-5 times per week for 20 minutes.     Printed diabetic diet recommendations, plate method

## 2023-06-23 DIAGNOSIS — I10 HYPERTENSION, UNSPECIFIED TYPE: ICD-10-CM

## 2023-06-23 RX ORDER — LOVASTATIN 20 MG/1
20 TABLET ORAL DAILY
Qty: 90 TABLET | Refills: 0 | Status: SHIPPED | OUTPATIENT
Start: 2023-06-23 | End: 2023-09-21 | Stop reason: SDUPTHER

## 2023-06-26 ENCOUNTER — TELEPHONE (OUTPATIENT)
Dept: FAMILY MEDICINE | Facility: CLINIC | Age: 48
End: 2023-06-26
Payer: COMMERCIAL

## 2023-06-26 DIAGNOSIS — N89.8 VAGINAL DISCHARGE: ICD-10-CM

## 2023-06-26 DIAGNOSIS — N89.8 VAGINAL ITCHING: Primary | ICD-10-CM

## 2023-06-26 PROCEDURE — 81003 URINALYSIS AUTO W/O SCOPE: CPT | Mod: QW,,, | Performed by: NURSE PRACTITIONER

## 2023-06-26 PROCEDURE — 81003 POCT URINALYSIS W/O SCOPE: ICD-10-PCS | Mod: QW,,, | Performed by: NURSE PRACTITIONER

## 2023-06-26 NOTE — TELEPHONE ENCOUNTER
Pt called to report she has vaginal itching, burning and discharge since she started taking Farxiga on 06/20/2023. Please advise. Thanks

## 2023-06-27 LAB
BILIRUB SERPL-MCNC: NORMAL MG/DL
BLOOD URINE, POC: NORMAL
COLOR, POC UA: YELLOW
GLUCOSE UR QL STRIP: 500
KETONES UR QL STRIP: NORMAL
LEUKOCYTE ESTERASE URINE, POC: NORMAL
NITRITE, POC UA: NORMAL
PH, POC UA: 5
PROTEIN, POC: NORMAL
SPECIFIC GRAVITY, POC UA: 1.02
UROBILINOGEN, POC UA: 0.2

## 2023-06-27 NOTE — TELEPHONE ENCOUNTER
Stop Farxiga, and please have her come in to clinic for urine and affirm swab, bring in bg log with her

## 2023-06-28 DIAGNOSIS — B37.31 VAGINAL CANDIDIASIS: Primary | ICD-10-CM

## 2023-06-28 RX ORDER — FLUCONAZOLE 150 MG/1
150 TABLET ORAL DAILY
Qty: 2 TABLET | Refills: 0 | Status: SHIPPED | OUTPATIENT
Start: 2023-06-28 | End: 2023-07-20

## 2023-07-07 ENCOUNTER — HOSPITAL ENCOUNTER (EMERGENCY)
Facility: HOSPITAL | Age: 48
Discharge: HOME | End: 2023-07-07
Payer: COMMERCIAL

## 2023-07-07 VITALS
SYSTOLIC BLOOD PRESSURE: 144 MMHG | BODY MASS INDEX: 33.82 KG/M2 | TEMPERATURE: 98 F | HEART RATE: 100 BPM | DIASTOLIC BLOOD PRESSURE: 75 MMHG | WEIGHT: 203 LBS | RESPIRATION RATE: 18 BRPM | OXYGEN SATURATION: 98 % | HEIGHT: 65 IN
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
## 2023-07-07 DIAGNOSIS — S96.911A STRAIN OF RIGHT FOOT, INITIAL ENCOUNTER: ICD-10-CM

## 2023-07-07 DIAGNOSIS — S99.921A RIGHT FOOT INJURY, INITIAL ENCOUNTER: ICD-10-CM

## 2023-07-07 PROCEDURE — 99283 EMERGENCY DEPT VISIT LOW MDM: CPT

## 2023-07-07 PROCEDURE — 99283 EMERGENCY DEPT VISIT LOW MDM: CPT | Mod: GF | Performed by: NURSE PRACTITIONER

## 2023-07-07 NOTE — DISCHARGE INSTRUCTIONS
Use over the counter meds for pain.  Follow-up with your family doctor in 1 week, if you are still having significant pain.  Return to the ER for worsening of symptoms.

## 2023-07-07 NOTE — ED PROVIDER NOTES
Encounter Date: 7/7/2023       History     Chief Complaint   Patient presents with    Foot Injury     48 year old AAF presents to the ER for right foot pain/injury s/p fall at home.  Pt reports falling when trying to walk to the bathroom.  Reports pain to right foot. Denies hitting head and denies LOC.  Denies ankle pain.     The history is provided by the patient.   Foot Injury   The incident occurred at home. The injury mechanism was a fall. The incident occurred just prior to arrival. The pain is present in the right foot. The pain has been Constant since onset. Pertinent negatives include no numbness, no loss of motion, no muscle weakness, no loss of sensation and no tingling. She reports no foreign bodies present. The symptoms are aggravated by activity, bearing weight and palpation. She has tried nothing for the symptoms.   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 Added automatically from request for surgery [id number]     Past Surgical History:   Procedure Laterality Date    DILATION AND CURETTAGE OF UTERUS  2000    preformed at [facility]    HYSTERECTOMY      OOPHORECTOMY      pt has had one ovary removed, unsure side    TUBAL LIGATION  1999    [facility]     Family History   Problem Relation Age of Onset    Asthma Mother     Coronary artery disease Mother     Diabetes Father     Heart disease Father     Cancer Sister         Breast    Breast cancer Sister      Social History     Tobacco Use    Smoking status: Never     Passive exposure: Never    Smokeless tobacco: Current     Types: Snuff   Substance Use Topics    Alcohol use: Never    Drug use: Never     Review of Systems   Constitutional:  Negative for fever.   HENT:  Negative for sore  throat.    Respiratory:  Negative for shortness of breath.    Cardiovascular:  Negative for chest pain.   Gastrointestinal:  Negative for nausea.   Genitourinary:  Negative for dysuria.   Musculoskeletal:  Negative for back pain.   Skin:  Negative for rash.   Neurological:  Negative for tingling, weakness and numbness.   Hematological:  Does not bruise/bleed easily.     Physical Exam     Initial Vitals [07/07/23 0027]   BP Pulse Resp Temp SpO2   (!) 144/75 100 18 98.1 °F (36.7 °C) 98 %      MAP       --         Physical Exam    Nursing note and vitals reviewed.  Constitutional: She appears well-developed and well-nourished. She is not diaphoretic. No distress.   HENT:   Head: Normocephalic and atraumatic.   Eyes: Pupils are equal, round, and reactive to light.   Neck: Neck supple.   Cardiovascular:  Normal rate, regular rhythm, normal heart sounds and intact distal pulses.     Exam reveals no gallop and no friction rub.       No murmur heard.  Pulmonary/Chest: Breath sounds normal. No respiratory distress. She has no wheezes. She has no rhonchi. She has no rales. She exhibits no tenderness.   Musculoskeletal:      Cervical back: Neck supple.      Right ankle: Normal. No swelling, deformity or ecchymosis. No tenderness. Normal range of motion. Normal pulse.      Right foot: Normal capillary refill. Tenderness and bony tenderness present. No swelling, deformity or crepitus. Normal pulse.      Comments: Tenderness over right proximal 4th and 5th metatarsals.     Neurological: She is alert and oriented to person, place, and time.   Skin: Skin is warm and dry. Capillary refill takes less than 2 seconds.   Psychiatric: She has a normal mood and affect.       Medical Screening Exam   See Full Note    ED Course   Procedures  Labs Reviewed - No data to display       Imaging Results              X-Ray Foot Complete Right (Preliminary result)  Result time 07/07/23 01:16:47      Wet Read by MEENA Roberts (07/07/23 01:16:47,  [facility] - Emergency Department, Emergency Medicine)    No acute fracture or dislocation noted per VR                                     Medications - No data to display  Medical Decision Making:   Differential Diagnosis:   Fracture, contusion, sprain  Clinical Tests:   Radiological Study: Ordered and Reviewed  ED Management:  Xray does not show acute findings.  Will place ace wrap for support.  Will have her use RICE therapy and OTC meds for pain.                        Clinical Impression:   Final diagnoses:  [S99.921A] Right foot injury, initial encounter  [W19.XXXA] Fall, initial encounter (Primary)  [S96.911A] Strain of right foot, initial encounter        ED Disposition Condition    Discharge Stable          ED Prescriptions    None       Follow-up Information       Follow up With Specialties Details Why Contact Info    [facility] Schedule an appointment as soon as possible for a visit in 1 week As needed, If symptoms worsen [address] [place] [address]  [facility] [place] [id number]  [phone]               MEENA Roberts  07/07/23 0120

## 2023-07-20 ENCOUNTER — OFFICE VISIT (OUTPATIENT)
Dept: FAMILY MEDICINE | Facility: CLINIC | Age: 48
End: 2023-07-20
Payer: COMMERCIAL

## 2023-07-20 VITALS
HEIGHT: 65 IN | HEART RATE: 79 BPM | OXYGEN SATURATION: 99 % | WEIGHT: 207.81 LBS | BODY MASS INDEX: 34.62 KG/M2 | SYSTOLIC BLOOD PRESSURE: 110 MMHG | DIASTOLIC BLOOD PRESSURE: 76 MMHG

## 2023-07-20 DIAGNOSIS — Z72.0 SMOKELESS TOBACCO USE: ICD-10-CM

## 2023-07-20 DIAGNOSIS — I10 HYPERTENSION, UNSPECIFIED TYPE: ICD-10-CM

## 2023-07-20 DIAGNOSIS — E11.9 TYPE 2 DIABETES MELLITUS WITHOUT COMPLICATION, WITHOUT LONG-TERM CURRENT USE OF INSULIN: Primary | ICD-10-CM

## 2023-07-20 DIAGNOSIS — G73.7 MYOPATHY IN DISEASES CLASSIFIED ELSEWHERE: ICD-10-CM

## 2023-07-20 DIAGNOSIS — E78.5 HYPERLIPIDEMIA, UNSPECIFIED HYPERLIPIDEMIA TYPE: ICD-10-CM

## 2023-07-20 PROCEDURE — 3061F NEG MICROALBUMINURIA REV: CPT | Mod: ,,, | Performed by: NURSE PRACTITIONER

## 2023-07-20 PROCEDURE — 3066F PR DOCUMENTATION OF TREATMENT FOR NEPHROPATHY: ICD-10-PCS | Mod: ,,, | Performed by: NURSE PRACTITIONER

## 2023-07-20 PROCEDURE — 1160F PR REVIEW ALL MEDS BY PRESCRIBER/CLIN PHARMACIST DOCUMENTED: ICD-10-PCS | Mod: ,,, | Performed by: NURSE PRACTITIONER

## 2023-07-20 PROCEDURE — 1160F RVW MEDS BY RX/DR IN RCRD: CPT | Mod: ,,, | Performed by: NURSE PRACTITIONER

## 2023-07-20 PROCEDURE — 3008F PR BODY MASS INDEX (BMI) DOCUMENTED: ICD-10-PCS | Mod: ,,, | Performed by: NURSE PRACTITIONER

## 2023-07-20 PROCEDURE — 3074F SYST BP LT 130 MM HG: CPT | Mod: ,,, | Performed by: NURSE PRACTITIONER

## 2023-07-20 PROCEDURE — 3061F PR NEG MICROALBUMINURIA RESULT DOCUMENTED/REVIEW: ICD-10-PCS | Mod: ,,, | Performed by: NURSE PRACTITIONER

## 2023-07-20 PROCEDURE — 99213 OFFICE O/P EST LOW 20 MIN: CPT | Mod: ,,, | Performed by: NURSE PRACTITIONER

## 2023-07-20 PROCEDURE — 3066F NEPHROPATHY DOC TX: CPT | Mod: ,,, | Performed by: NURSE PRACTITIONER

## 2023-07-20 PROCEDURE — 3008F BODY MASS INDEX DOCD: CPT | Mod: ,,, | Performed by: NURSE PRACTITIONER

## 2023-07-20 PROCEDURE — 1159F MED LIST DOCD IN RCRD: CPT | Mod: ,,, | Performed by: NURSE PRACTITIONER

## 2023-07-20 PROCEDURE — 1159F PR MEDICATION LIST DOCUMENTED IN MEDICAL RECORD: ICD-10-PCS | Mod: ,,, | Performed by: NURSE PRACTITIONER

## 2023-07-20 PROCEDURE — 3074F PR MOST RECENT SYSTOLIC BLOOD PRESSURE < 130 MM HG: ICD-10-PCS | Mod: ,,, | Performed by: NURSE PRACTITIONER

## 2023-07-20 PROCEDURE — 3078F DIAST BP <80 MM HG: CPT | Mod: ,,, | Performed by: NURSE PRACTITIONER

## 2023-07-20 PROCEDURE — 3078F PR MOST RECENT DIASTOLIC BLOOD PRESSURE < 80 MM HG: ICD-10-PCS | Mod: ,,, | Performed by: NURSE PRACTITIONER

## 2023-07-20 PROCEDURE — 3046F PR MOST RECENT HEMOGLOBIN A1C LEVEL > 9.0%: ICD-10-PCS | Mod: ,,, | Performed by: NURSE PRACTITIONER

## 2023-07-20 PROCEDURE — 99213 PR OFFICE/OUTPT VISIT, EST, LEVL III, 20-29 MIN: ICD-10-PCS | Mod: ,,, | Performed by: NURSE PRACTITIONER

## 2023-07-20 PROCEDURE — 3046F HEMOGLOBIN A1C LEVEL >9.0%: CPT | Mod: ,,, | Performed by: NURSE PRACTITIONER

## 2023-07-20 RX ORDER — SEMAGLUTIDE 1.34 MG/ML
1 INJECTION, SOLUTION SUBCUTANEOUS
Qty: 9 ML | Refills: 0 | Status: SHIPPED | OUTPATIENT
Start: 2023-07-20 | End: 2023-09-21 | Stop reason: SDUPTHER

## 2023-07-20 RX ORDER — BLOOD SUGAR DIAGNOSTIC
STRIP MISCELLANEOUS
Qty: 100 EACH | Refills: 5 | Status: SHIPPED | OUTPATIENT
Start: 2023-07-20 | End: 2023-09-21 | Stop reason: SDUPTHER

## 2023-07-20 NOTE — PROGRESS NOTES
Clinic note     Patient name: Tequila Vázquez is a 48 y.o. female   Chief compliant   Chief Complaint   Patient presents with    Follow-up     Bg has been running in the 200s. States she is doing ok.        Subjective     History of present illness   In clinic for follow up on blood glucose readings, denies any acute concerns at present     Past Medical History: asthma, DM type 2, latent syphilis, myasthenia gravis, hyperlipidemia    Last A1c 12.3, due to recheck after 9/10/23  Checking blood glucose BID with fasting reading in 180's and pm readings in 280's   Blood glucose log, forgot at home  Glucometer: does not have in clinic   Will continue metformin and ozempic at current dose, will start tresiba 6 units daily, staff will assist with first dose in clinic   Have her keep blood glucose log and follow up in clinic in two weeks, instructed to call clinic with any concerns or for any hypoglycemia    Farxiga was not tolerated due to side effects  She is walking for exercise almost daily, she is trying to follow diet recommendations, drinking only water and crystal lite   She is on prednisone 15 mg daily per neurology [facility]      Hx of right eyelid ptosis and weakness of RUE and RLE, myasthenia gravis; recently seen by [facility] neurology,  Dr Desir and ANDREA Rogers NP-C at  neurology [facility]  She has follow up at [facility] on 7/26/23 to get the results of recent muscle biopsy           Social History     Tobacco Use    Smoking status: Never     Passive exposure: Never    Smokeless tobacco: Current     Types: Snuff   Substance Use Topics    Alcohol use: Never    Drug use: Never       Review of patient's allergies indicates:   Allergen Reactions    Aspirin Hives       Past Medical History:   Diagnosis Date    Asthma     Diabetes mellitus     Late syphilis, 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 Added automatically from request  "for surgery [id number]       Past Surgical History:   Procedure Laterality Date    DILATION AND CURETTAGE OF UTERUS  2000    preformed at [facility]    HYSTERECTOMY      OOPHORECTOMY      pt has had one ovary removed, unsure side    TUBAL LIGATION  1999    [facility]        Family History   Problem Relation Age of Onset    Asthma Mother     Coronary artery disease Mother     Diabetes Father     Heart disease Father     Cancer Sister         Breast    Breast cancer Sister          Current Outpatient Medications:     albuterol (PROVENTIL/VENTOLIN HFA) 90 mcg/actuation inhaler, Inhale 2 puffs into the lungs every 6 (six) hours as needed for Wheezing. Rescue, Disp: 18 g, Rfl: 2    fluticasone propionate (FLONASE) 50 mcg/actuation nasal spray, 1 spray (50 mcg total) by Each Nostril route once daily., Disp: 16 g, Rfl: 2    lovastatin (MEVACOR) 20 MG tablet, Take 1 tablet (20 mg total) by mouth once daily., Disp: 90 tablet, Rfl: 0    metFORMIN (GLUCOPHAGE) 500 MG tablet, Take 1 tablet (500 mg total) by mouth 2 (two) times daily with meals., Disp: 180 tablet, Rfl: 0    predniSONE (DELTASONE) 10 MG tablet, Take 1.5 tablets by mouth Daily., Disp: , Rfl:     pregabalin (LYRICA) 25 MG capsule, Take 25 mg by mouth 3 (three) times daily., Disp: , Rfl:     pyridostigmine (MESTINON) 60 mg Tab, Take 60 mg by mouth 3 (three) times daily., Disp: , Rfl:     lancets (E-Z JECT LANCETS) 32 gauge Misc, 100 lancets by Misc.(Non-Drug; Combo Route) route. USE TO CHECK GLUCOSE ONCE DAILY, Disp: , Rfl:     pen needle, diabetic 32 gauge x 1/4" Ndle, Use one needle to inject insulin daily as prescribed, Disp: 100 each, Rfl: 5    semaglutide (OZEMPIC) 1 mg/dose (4 mg/3 mL), Inject 1 mg into the skin every 7 days., Disp: 9 mL, Rfl: 0    Review of Systems   Constitutional:  Negative for appetite change, chills, fatigue, fever and unexpected weight change.   Respiratory:  Negative for cough and shortness of breath.    Cardiovascular:  Negative for " "chest pain, palpitations and leg swelling.   Gastrointestinal:  Negative for abdominal pain, change in bowel habit, constipation, diarrhea, nausea, vomiting and change in bowel habit.   Genitourinary:  Negative for dysuria and frequency.   Musculoskeletal:  Negative for arthralgias, gait problem and myalgias.   Neurological:  Negative for dizziness, syncope, light-headedness and headaches.   Psychiatric/Behavioral:  Negative for dysphoric mood and sleep disturbance. The patient is not nervous/anxious.      Objective     /76   Pulse 79   Ht 5' 5" (1.651 m)   Wt 94.3 kg (207 lb 12.8 oz)   SpO2 99%   BMI 34.58 kg/m²     Physical Exam   Constitutional: She is oriented to person, place, and time. No distress.   HENT:   Head: Atraumatic.   Mouth/Throat: Mucous membranes are moist.   Eyes:   Right eye ptosis      Cardiovascular: Normal rate and regular rhythm. Pulmonary:      Effort: Pulmonary effort is normal. No respiratory distress.      Breath sounds: Normal breath sounds. No wheezing, rhonchi or rales.     Abdominal: Soft. Bowel sounds are normal. She exhibits no distension. There is no abdominal tenderness.   Musculoskeletal:         General: Normal range of motion.      Cervical back: Neck supple.      Right lower leg: No edema.      Left lower leg: No edema.   Neurological: She is alert and oriented to person, place, and time. Gait normal.   Skin: Skin is warm and dry.   Psychiatric: Her behavior is normal. Mood normal.     Lab Results   Component Value Date    WBC 4.66 12/28/2022    HGB 13.1 12/28/2022    HCT 38.1 12/28/2022    MCV 92.9 12/28/2022     12/28/2022       CMP  Sodium   Date Value Ref Range Status   12/28/2022 144 136 - 145 mmol/L Final     Potassium   Date Value Ref Range Status   12/28/2022 4.3 3.5 - 5.1 mmol/L Final     Chloride   Date Value Ref Range Status   12/28/2022 107 98 - 107 mmol/L Final     CO2   Date Value Ref Range Status   12/28/2022 30 21 - 32 mmol/L Final     Glucose "   Date Value Ref Range Status   12/28/2022 117 (H) 74 - 106 mg/dL Final     BUN   Date Value Ref Range Status   12/28/2022 8 7 - 18 mg/dL Final     Creatinine   Date Value Ref Range Status   12/28/2022 0.84 0.55 - 1.02 mg/dL Final     Calcium   Date Value Ref Range Status   12/28/2022 8.0 (L) 8.5 - 10.1 mg/dL Final     Total Protein   Date Value Ref Range Status   12/28/2022 7.1 6.4 - 8.2 g/dL Final     Albumin   Date Value Ref Range Status   12/28/2022 3.7 3.5 - 5.0 g/dL Final     Bilirubin, Total   Date Value Ref Range Status   12/28/2022 0.4 >0.0 - 1.2 mg/dL Final     Alk Phos   Date Value Ref Range Status   12/28/2022 105 (H) 39 - 100 U/L Final     AST   Date Value Ref Range Status   12/28/2022 17 15 - 37 U/L Final     ALT   Date Value Ref Range Status   12/28/2022 24 13 - 56 U/L Final     Anion Gap   Date Value Ref Range Status   12/28/2022 11 7 - 16 mmol/L Final     eGFR    Date Value Ref Range Status   08/13/2021 67 >=60 mL/min/1.73m² Final     eGFR   Date Value Ref Range Status   04/13/2022 61 >=60 mL/min/1.73m² Final     Lab Results   Component Value Date    TSH 0.766 08/13/2021     Lab Results   Component Value Date    CHOL 232 (H) 06/12/2023    CHOL 216 (H) 04/13/2022    CHOL 250 (H) 04/19/2021     Lab Results   Component Value Date    HDL 67 (H) 06/12/2023    HDL 58 04/13/2022    HDL 54 04/19/2021     Lab Results   Component Value Date    LDLCALC 143 06/12/2023    LDLCALC 137 04/13/2022    LDLCALC 166 04/19/2021     Lab Results   Component Value Date    TRIG 112 06/12/2023    TRIG 106 04/13/2022    TRIG 150 04/19/2021     Lab Results   Component Value Date    CHOLHDL 3.5 06/12/2023    CHOLHDL 3.7 04/13/2022    CHOLHDL 4.6 04/19/2021     Lab Results   Component Value Date    HGBA1C 12.3 (H) 06/12/2023         Assessment and Plan   Type 2 diabetes mellitus without complication, without long-term current use of insulin  -     semaglutide (OZEMPIC) 1 mg/dose (4 mg/3 mL); Inject 1 mg into the  "skin every 7 days.  Dispense: 9 mL; Refill: 0  -     pen needle, diabetic 32 gauge x 1/4" Ndle; Use one needle to inject insulin daily as prescribed  Dispense: 100 each; Refill: 5    Hypertension, unspecified type    Hyperlipidemia, unspecified hyperlipidemia type    Myopathy in diseases classified elsewhere    BMI 34.0-34.9,adult    Smokeless tobacco use          Patient Instructions  Patient Instructions   Start tresiba 6 units daily, first dose in clinic assisted by staff  Continue other medication as prescribed   Call clinic with any questions or concerns   Sample tresiba provided [id number] exp 12/31/2024    Pt is advised to monitor and document glucose fasting when you wake up before you eat and 2 hours after meal and bring bg log to clinic  in one week for me to review  Ensure to take medications as directed.    Follow diabetic diet as directed.    Work to achieve normal body weight.   Ensure to exercise 4-5 times per week for 20 minutes.     Follow up in two weeks and as needed   Bring blood glucose log to clinic visits                                   "

## 2023-07-20 NOTE — PATIENT INSTRUCTIONS
Start tresiba 6 units daily, first dose in clinic assisted by staff  Continue other medication as prescribed   Call clinic with any questions or concerns   Sample tresiba provided [id number] exp 12/31/2024    Pt is advised to monitor and document glucose fasting when you wake up before you eat and 2 hours after meal and bring bg log to clinic  in one week for me to review  Ensure to take medications as directed.    Follow diabetic diet as directed.    Work to achieve normal body weight.   Ensure to exercise 4-5 times per week for 20 minutes.     Follow up in two weeks and as needed   Bring blood glucose log to clinic visits

## 2023-07-20 NOTE — PROGRESS NOTES
Assisted pt with first dose of tresiba 6 units subq per NP. PT demonstrated injection, pt voiced no concerns or complaints. Injection went well, pt was comfortable with process.

## 2023-08-02 NOTE — PROGRESS NOTES
Clinic note     Patient name: Tequila Vázquez is a 48 y.o. female   Chief compliant   Chief Complaint   Patient presents with    Diabetes     Here for follow. States her numbers are still elevated accu check was 237. Added tresiba at last visit and doing 4 units daily.        Subjective     History of present illness   In clinic for follow up after starting Tresiba insulin last office visit     Past Medical History: asthma, DM type 2, latent syphilis, myasthenia gravis, hyperlipidemia    Last A1c 12.3, this is due to recheck after 9-10-23  Tresiba 6 units started on 7-20-23; she states she is taking 4 units daily and fasting glucose today was 237  Checking blood glucose BID and prn, three day fasting average 204  Blood glucose log reviewed and will be scanned to record  Glucometer: does not have in clinic   Will increase tresiba to 8 units daily     Hx of right eyelid ptosis and weakness of RUE and RLE, myasthenia gravis; recently seen by [facility] neurology,  Dr Desir and ANDREA Rogers, NP-C at  neurology [facility]    She reports results of recent muscle biopsy indicated SLE, she has been started on methotrexate, prednisone continued           Social History     Tobacco Use    Smoking status: Never     Passive exposure: Never    Smokeless tobacco: Current     Types: Snuff   Substance Use Topics    Alcohol use: Never    Drug use: Never       Review of patient's allergies indicates:   Allergen Reactions    Aspirin Hives       Past Medical History:   Diagnosis Date    Asthma     Diabetes mellitus     Late syphilis, 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 Added automatically from request for surgery [id number]       Past Surgical History:   Procedure Laterality Date    DILATION AND CURETTAGE OF UTERUS  2000    preformed at [facility]    HYSTERECTOMY      OOPHORECTOMY      pt has had one ovary removed, unsure side    TUBAL  "LIGATION  1999    [facility]        Family History   Problem Relation Age of Onset    Asthma Mother     Coronary artery disease Mother     Diabetes Father     Heart disease Father     Cancer Sister         Breast    Breast cancer Sister          Current Outpatient Medications:     albuterol (PROVENTIL/VENTOLIN HFA) 90 mcg/actuation inhaler, Inhale 2 puffs into the lungs every 6 (six) hours as needed for Wheezing. Rescue, Disp: 18 g, Rfl: 2    fluticasone propionate (FLONASE) 50 mcg/actuation nasal spray, 1 spray (50 mcg total) by Each Nostril route once daily., Disp: 16 g, Rfl: 2    lancets (E-Z JECT LANCETS) 32 gauge Misc, 100 lancets by Misc.(Non-Drug; Combo Route) route. USE TO CHECK GLUCOSE ONCE DAILY, Disp: , Rfl:     lovastatin (MEVACOR) 20 MG tablet, Take 1 tablet (20 mg total) by mouth once daily., Disp: 90 tablet, Rfl: 0    metFORMIN (GLUCOPHAGE) 500 MG tablet, Take 1 tablet (500 mg total) by mouth 2 (two) times daily with meals., Disp: 180 tablet, Rfl: 0    methotrexate 5 MG tablet, Take 12.5mg on Saturday and 12.5mg on Sunday max 25mg weekly, Disp: , Rfl:     pen needle, diabetic 32 gauge x 1/4" Ndle, Use one needle to inject insulin daily as prescribed, Disp: 100 each, Rfl: 5    predniSONE (DELTASONE) 10 MG tablet, Take 1.5 tablets by mouth Daily., Disp: , Rfl:     pregabalin (LYRICA) 25 MG capsule, Take 25 mg by mouth 3 (three) times daily., Disp: , Rfl:     pyridostigmine (MESTINON) 60 mg Tab, Take 60 mg by mouth 3 (three) times daily., Disp: , Rfl:     semaglutide (OZEMPIC) 1 mg/dose (4 mg/3 mL), Inject 1 mg into the skin every 7 days., Disp: 9 mL, Rfl: 0    insulin degludec (TRESIBA FLEXTOUCH U-200) 200 unit/mL (3 mL) insulin pen, Inject 8 Units into the skin once daily., Disp: 2 pen , Rfl: 0    Review of Systems   Constitutional:  Negative for appetite change, chills, fatigue, fever and unexpected weight change.   Respiratory:  Negative for cough and shortness of breath.    Cardiovascular:  Negative " "for chest pain, palpitations and leg swelling.   Gastrointestinal:  Negative for abdominal pain, change in bowel habit, constipation, diarrhea, nausea, vomiting and change in bowel habit.   Genitourinary:  Negative for dysuria and frequency.   Musculoskeletal:  Negative for arthralgias, gait problem and myalgias.   Neurological:  Negative for dizziness, syncope, light-headedness and headaches.   Psychiatric/Behavioral:  Negative for dysphoric mood and sleep disturbance. The patient is not nervous/anxious.        Objective     /75   Pulse 70   Ht 5' 5" (1.651 m)   Wt 93.2 kg (205 lb 8 oz)   SpO2 100%   BMI 34.20 kg/m²     Physical Exam   Constitutional: She is oriented to person, place, and time. No distress.   HENT:   Head: Atraumatic.   Mouth/Throat: Mucous membranes are moist.   Eyes:   Right eye ptosis      Cardiovascular: Normal rate and regular rhythm. Pulmonary:      Effort: Pulmonary effort is normal. No respiratory distress.      Breath sounds: Normal breath sounds. No wheezing, rhonchi or rales.     Abdominal: Soft. Bowel sounds are normal. She exhibits no distension. There is no abdominal tenderness.   Musculoskeletal:         General: Normal range of motion.      Cervical back: Neck supple.      Right lower leg: No edema.      Left lower leg: No edema.   Neurological: She is alert and oriented to person, place, and time. Gait normal.   Skin: Skin is warm and dry.   Psychiatric: Her behavior is normal. Mood normal.       Lab Results   Component Value Date    WBC 4.66 12/28/2022    HGB 13.1 12/28/2022    HCT 38.1 12/28/2022    MCV 92.9 12/28/2022     12/28/2022       CMP  Sodium   Date Value Ref Range Status   12/28/2022 144 136 - 145 mmol/L Final     Potassium   Date Value Ref Range Status   12/28/2022 4.3 3.5 - 5.1 mmol/L Final     Chloride   Date Value Ref Range Status   12/28/2022 107 98 - 107 mmol/L Final     CO2   Date Value Ref Range Status   12/28/2022 30 21 - 32 mmol/L Final "     Glucose   Date Value Ref Range Status   12/28/2022 117 (H) 74 - 106 mg/dL Final     BUN   Date Value Ref Range Status   12/28/2022 8 7 - 18 mg/dL Final     Creatinine   Date Value Ref Range Status   12/28/2022 0.84 0.55 - 1.02 mg/dL Final     Calcium   Date Value Ref Range Status   12/28/2022 8.0 (L) 8.5 - 10.1 mg/dL Final     Total Protein   Date Value Ref Range Status   12/28/2022 7.1 6.4 - 8.2 g/dL Final     Albumin   Date Value Ref Range Status   12/28/2022 3.7 3.5 - 5.0 g/dL Final     Bilirubin, Total   Date Value Ref Range Status   12/28/2022 0.4 >0.0 - 1.2 mg/dL Final     Alk Phos   Date Value Ref Range Status   12/28/2022 105 (H) 39 - 100 U/L Final     AST   Date Value Ref Range Status   12/28/2022 17 15 - 37 U/L Final     ALT   Date Value Ref Range Status   12/28/2022 24 13 - 56 U/L Final     Anion Gap   Date Value Ref Range Status   12/28/2022 11 7 - 16 mmol/L Final     eGFR    Date Value Ref Range Status   08/13/2021 67 >=60 mL/min/1.73m² Final     eGFR   Date Value Ref Range Status   04/13/2022 61 >=60 mL/min/1.73m² Final     Lab Results   Component Value Date    TSH 0.766 08/13/2021     Lab Results   Component Value Date    CHOL 232 (H) 06/12/2023    CHOL 216 (H) 04/13/2022    CHOL 250 (H) 04/19/2021     Lab Results   Component Value Date    HDL 67 (H) 06/12/2023    HDL 58 04/13/2022    HDL 54 04/19/2021     Lab Results   Component Value Date    LDLCALC 143 06/12/2023    LDLCALC 137 04/13/2022    LDLCALC 166 04/19/2021     Lab Results   Component Value Date    TRIG 112 06/12/2023    TRIG 106 04/13/2022    TRIG 150 04/19/2021     Lab Results   Component Value Date    CHOLHDL 3.5 06/12/2023    CHOLHDL 3.7 04/13/2022    CHOLHDL 4.6 04/19/2021     Lab Results   Component Value Date    HGBA1C 12.3 (H) 06/12/2023         Assessment and Plan   Type 2 diabetes mellitus without complication, with long-term current use of insulin  -     insulin degludec (TRESIBA FLEXTOUCH U-200) 200 unit/mL (3  mL) insulin pen; Inject 8 Units into the skin once daily.  Dispense: 2 pen ; Refill: 0    Hypertension, unspecified type    Hyperlipidemia, unspecified hyperlipidemia type    Myopathy in diseases classified elsewhere    Migraine without aura and without status migrainosus, not intractable    Systemic lupus erythematosus, unspecified SLE type, unspecified organ involvement status    Ptosis of eyelid, unspecified laterality    Smokeless tobacco use    Assistance with cessation of smokeless tobacco use was offered, including:  []  Medications  [x]  Counseling  [x]  Printed Information on cessation of smokeless tobacco use       Patient was counseled regarding use of smokeless tobacco for 3-10 minutes.        Patient Instructions  Patient Instructions   Increase tresiba to 8 units daily     Continue blood glucose log and follow up in clinic in two weeks     Come in or call clinic for any signs of infection such as urinary tract infection, cough/sore throat

## 2023-08-03 ENCOUNTER — OFFICE VISIT (OUTPATIENT)
Dept: FAMILY MEDICINE | Facility: CLINIC | Age: 48
End: 2023-08-03
Payer: COMMERCIAL

## 2023-08-03 VITALS
OXYGEN SATURATION: 100 % | HEIGHT: 65 IN | SYSTOLIC BLOOD PRESSURE: 112 MMHG | WEIGHT: 205.5 LBS | DIASTOLIC BLOOD PRESSURE: 75 MMHG | HEART RATE: 70 BPM | BODY MASS INDEX: 34.24 KG/M2

## 2023-08-03 DIAGNOSIS — E11.9 TYPE 2 DIABETES MELLITUS WITHOUT COMPLICATION, WITH LONG-TERM CURRENT USE OF INSULIN: Primary | ICD-10-CM

## 2023-08-03 DIAGNOSIS — Z79.4 TYPE 2 DIABETES MELLITUS WITHOUT COMPLICATION, WITH LONG-TERM CURRENT USE OF INSULIN: Primary | ICD-10-CM

## 2023-08-03 DIAGNOSIS — I10 HYPERTENSION, UNSPECIFIED TYPE: ICD-10-CM

## 2023-08-03 DIAGNOSIS — M32.9 SYSTEMIC LUPUS ERYTHEMATOSUS, UNSPECIFIED SLE TYPE, UNSPECIFIED ORGAN INVOLVEMENT STATUS: ICD-10-CM

## 2023-08-03 DIAGNOSIS — Z72.0 SMOKELESS TOBACCO USE: ICD-10-CM

## 2023-08-03 DIAGNOSIS — G43.009 MIGRAINE WITHOUT AURA AND WITHOUT STATUS MIGRAINOSUS, NOT INTRACTABLE: ICD-10-CM

## 2023-08-03 DIAGNOSIS — H02.409 PTOSIS OF EYELID, UNSPECIFIED LATERALITY: ICD-10-CM

## 2023-08-03 DIAGNOSIS — G73.7 MYOPATHY IN DISEASES CLASSIFIED ELSEWHERE: ICD-10-CM

## 2023-08-03 DIAGNOSIS — E78.5 HYPERLIPIDEMIA, UNSPECIFIED HYPERLIPIDEMIA TYPE: ICD-10-CM

## 2023-08-03 PROCEDURE — 1160F RVW MEDS BY RX/DR IN RCRD: CPT | Mod: ,,, | Performed by: NURSE PRACTITIONER

## 2023-08-03 PROCEDURE — 1159F MED LIST DOCD IN RCRD: CPT | Mod: ,,, | Performed by: NURSE PRACTITIONER

## 2023-08-03 PROCEDURE — 3078F DIAST BP <80 MM HG: CPT | Mod: ,,, | Performed by: NURSE PRACTITIONER

## 2023-08-03 PROCEDURE — 3074F SYST BP LT 130 MM HG: CPT | Mod: ,,, | Performed by: NURSE PRACTITIONER

## 2023-08-03 PROCEDURE — 99406 BEHAV CHNG SMOKING 3-10 MIN: CPT | Mod: ,,, | Performed by: NURSE PRACTITIONER

## 2023-08-03 PROCEDURE — 3046F PR MOST RECENT HEMOGLOBIN A1C LEVEL > 9.0%: ICD-10-PCS | Mod: ,,, | Performed by: NURSE PRACTITIONER

## 2023-08-03 PROCEDURE — 3061F PR NEG MICROALBUMINURIA RESULT DOCUMENTED/REVIEW: ICD-10-PCS | Mod: ,,, | Performed by: NURSE PRACTITIONER

## 2023-08-03 PROCEDURE — 3046F HEMOGLOBIN A1C LEVEL >9.0%: CPT | Mod: ,,, | Performed by: NURSE PRACTITIONER

## 2023-08-03 PROCEDURE — 3074F PR MOST RECENT SYSTOLIC BLOOD PRESSURE < 130 MM HG: ICD-10-PCS | Mod: ,,, | Performed by: NURSE PRACTITIONER

## 2023-08-03 PROCEDURE — 3061F NEG MICROALBUMINURIA REV: CPT | Mod: ,,, | Performed by: NURSE PRACTITIONER

## 2023-08-03 PROCEDURE — 1160F PR REVIEW ALL MEDS BY PRESCRIBER/CLIN PHARMACIST DOCUMENTED: ICD-10-PCS | Mod: ,,, | Performed by: NURSE PRACTITIONER

## 2023-08-03 PROCEDURE — 1159F PR MEDICATION LIST DOCUMENTED IN MEDICAL RECORD: ICD-10-PCS | Mod: ,,, | Performed by: NURSE PRACTITIONER

## 2023-08-03 PROCEDURE — 3008F BODY MASS INDEX DOCD: CPT | Mod: ,,, | Performed by: NURSE PRACTITIONER

## 2023-08-03 PROCEDURE — 99406 PR TOBACCO USE CESSATION INTERMEDIATE 3-10 MINUTES: ICD-10-PCS | Mod: ,,, | Performed by: NURSE PRACTITIONER

## 2023-08-03 PROCEDURE — 3066F PR DOCUMENTATION OF TREATMENT FOR NEPHROPATHY: ICD-10-PCS | Mod: ,,, | Performed by: NURSE PRACTITIONER

## 2023-08-03 PROCEDURE — 3078F PR MOST RECENT DIASTOLIC BLOOD PRESSURE < 80 MM HG: ICD-10-PCS | Mod: ,,, | Performed by: NURSE PRACTITIONER

## 2023-08-03 PROCEDURE — 99213 PR OFFICE/OUTPT VISIT, EST, LEVL III, 20-29 MIN: ICD-10-PCS | Mod: 25,,, | Performed by: NURSE PRACTITIONER

## 2023-08-03 PROCEDURE — 99213 OFFICE O/P EST LOW 20 MIN: CPT | Mod: 25,,, | Performed by: NURSE PRACTITIONER

## 2023-08-03 PROCEDURE — 3008F PR BODY MASS INDEX (BMI) DOCUMENTED: ICD-10-PCS | Mod: ,,, | Performed by: NURSE PRACTITIONER

## 2023-08-03 PROCEDURE — 3066F NEPHROPATHY DOC TX: CPT | Mod: ,,, | Performed by: NURSE PRACTITIONER

## 2023-08-03 RX ORDER — INSULIN DEGLUDEC 200 U/ML
8 INJECTION, SOLUTION SUBCUTANEOUS DAILY
Qty: 2 PEN | Refills: 0 | Status: SHIPPED | OUTPATIENT
Start: 2023-08-03 | End: 2023-09-21 | Stop reason: SDUPTHER

## 2023-08-03 NOTE — PATIENT INSTRUCTIONS
Increase tresiba to 8 units daily     Continue blood glucose log and follow up in clinic in two weeks     Come in or call clinic for any signs of infection such as urinary tract infection, cough/sore throat

## 2023-08-28 ENCOUNTER — OFFICE VISIT (OUTPATIENT)
Dept: FAMILY MEDICINE | Facility: CLINIC | Age: 48
End: 2023-08-28
Payer: COMMERCIAL

## 2023-08-28 VITALS
WEIGHT: 207 LBS | RESPIRATION RATE: 14 BRPM | HEIGHT: 65 IN | DIASTOLIC BLOOD PRESSURE: 83 MMHG | SYSTOLIC BLOOD PRESSURE: 136 MMHG | HEART RATE: 90 BPM | BODY MASS INDEX: 34.49 KG/M2 | OXYGEN SATURATION: 98 %

## 2023-08-28 DIAGNOSIS — L29.8 PRURITIC DERMATOSIS OF SCALP: Primary | ICD-10-CM

## 2023-08-28 PROCEDURE — 3066F PR DOCUMENTATION OF TREATMENT FOR NEPHROPATHY: ICD-10-PCS | Mod: ,,, | Performed by: NURSE PRACTITIONER

## 2023-08-28 PROCEDURE — 3066F NEPHROPATHY DOC TX: CPT | Mod: ,,, | Performed by: NURSE PRACTITIONER

## 2023-08-28 PROCEDURE — 3008F BODY MASS INDEX DOCD: CPT | Mod: ,,, | Performed by: NURSE PRACTITIONER

## 2023-08-28 PROCEDURE — 3046F PR MOST RECENT HEMOGLOBIN A1C LEVEL > 9.0%: ICD-10-PCS | Mod: ,,, | Performed by: NURSE PRACTITIONER

## 2023-08-28 PROCEDURE — 3046F HEMOGLOBIN A1C LEVEL >9.0%: CPT | Mod: ,,, | Performed by: NURSE PRACTITIONER

## 2023-08-28 PROCEDURE — 1159F PR MEDICATION LIST DOCUMENTED IN MEDICAL RECORD: ICD-10-PCS | Mod: ,,, | Performed by: NURSE PRACTITIONER

## 2023-08-28 PROCEDURE — 99213 OFFICE O/P EST LOW 20 MIN: CPT | Mod: ,,, | Performed by: NURSE PRACTITIONER

## 2023-08-28 PROCEDURE — 3079F PR MOST RECENT DIASTOLIC BLOOD PRESSURE 80-89 MM HG: ICD-10-PCS | Mod: ,,, | Performed by: NURSE PRACTITIONER

## 2023-08-28 PROCEDURE — 99213 PR OFFICE/OUTPT VISIT, EST, LEVL III, 20-29 MIN: ICD-10-PCS | Mod: ,,, | Performed by: NURSE PRACTITIONER

## 2023-08-28 PROCEDURE — 3061F NEG MICROALBUMINURIA REV: CPT | Mod: ,,, | Performed by: NURSE PRACTITIONER

## 2023-08-28 PROCEDURE — 3075F SYST BP GE 130 - 139MM HG: CPT | Mod: ,,, | Performed by: NURSE PRACTITIONER

## 2023-08-28 PROCEDURE — 1160F PR REVIEW ALL MEDS BY PRESCRIBER/CLIN PHARMACIST DOCUMENTED: ICD-10-PCS | Mod: ,,, | Performed by: NURSE PRACTITIONER

## 2023-08-28 PROCEDURE — 1159F MED LIST DOCD IN RCRD: CPT | Mod: ,,, | Performed by: NURSE PRACTITIONER

## 2023-08-28 PROCEDURE — 3008F PR BODY MASS INDEX (BMI) DOCUMENTED: ICD-10-PCS | Mod: ,,, | Performed by: NURSE PRACTITIONER

## 2023-08-28 PROCEDURE — 1160F RVW MEDS BY RX/DR IN RCRD: CPT | Mod: ,,, | Performed by: NURSE PRACTITIONER

## 2023-08-28 PROCEDURE — 3079F DIAST BP 80-89 MM HG: CPT | Mod: ,,, | Performed by: NURSE PRACTITIONER

## 2023-08-28 PROCEDURE — 3061F PR NEG MICROALBUMINURIA RESULT DOCUMENTED/REVIEW: ICD-10-PCS | Mod: ,,, | Performed by: NURSE PRACTITIONER

## 2023-08-28 PROCEDURE — 3075F PR MOST RECENT SYSTOLIC BLOOD PRESS GE 130-139MM HG: ICD-10-PCS | Mod: ,,, | Performed by: NURSE PRACTITIONER

## 2023-08-28 RX ORDER — CLOBETASOL PROPIONATE 0.46 MG/ML
SOLUTION TOPICAL 2 TIMES DAILY
Qty: 25 ML | Refills: 0 | Status: SHIPPED | OUTPATIENT
Start: 2023-08-28 | End: 2024-01-24 | Stop reason: ALTCHOICE

## 2023-08-28 NOTE — PATIENT INSTRUCTIONS
Clobetasol solution to scalp two times daily for five days     Follow up in one week if symptoms persist, sooner if symptoms worsen     A1c will be due again 9/10/2023, follow up then

## 2023-08-28 NOTE — PROGRESS NOTES
"Clinic note     Patient name: Tequila Vázquez is a 48 y.o. female   Chief compliant   Chief Complaint   Patient presents with    Rash     Pt c/o itching in scalp, around face and FPC down her back after she had her hair washed Friday, pt states she also had an infusion for her lupus in [place] on Friday. Pt went to the ER in [place] and got a shot of steroids for itching. No relief so far.       Subjective     History of present illness   In clinic for evaluation of scalp rash which started Friday 8/25/23 after [employer] used a different shampoo on her hair  Was seen at [facility] ED and received decadron IM, record reviewed   She reports ongoing itching to edges of scalp        Past Medical History: asthma, DM type 2, latent syphilis, myasthenia gravis, hyperlipidemia     Last A1c 12.3, this is due to recheck after 9-10-23  Checking blood glucose BID and prn  Blood glucose log : not in clinic, she reports fasting blood glucose 130-140  Glucometer: does not have in clinic   Will increase tresiba to 8 units daily      Hx of right eyelid ptosis and weakness of RUE and RLE, myasthenia gravis; recently seen by [facility] neurology,  Dr Desir and ANDREA Rogers, NP-C at  neurology [facility]     She reports results of recent muscle biopsy indicated SLE, she has been started on methotrexate, prednisone continued   She states she had "lupus infusion" #3 on 8/25/23 and is scheduled for #4 later this week          Social History     Tobacco Use    Smoking status: Never     Passive exposure: Never    Smokeless tobacco: Current     Types: Snuff   Substance Use Topics    Alcohol use: Never    Drug use: Never       Review of patient's allergies indicates:   Allergen Reactions    Aspirin Hives       Past Medical History:   Diagnosis Date    Asthma     Diabetes mellitus     Late syphilis, 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 Added " "automatically from request for surgery [id number]       Past Surgical History:   Procedure Laterality Date    DILATION AND CURETTAGE OF UTERUS  2000    preformed at [facility]    HYSTERECTOMY      OOPHORECTOMY      pt has had one ovary removed, unsure side    TUBAL LIGATION  1999    [facility]        Family History   Problem Relation Age of Onset    Asthma Mother     Coronary artery disease Mother     Diabetes Father     Heart disease Father     Cancer Sister         Breast    Breast cancer Sister          Current Outpatient Medications:     albuterol (PROVENTIL/VENTOLIN HFA) 90 mcg/actuation inhaler, Inhale 2 puffs into the lungs every 6 (six) hours as needed for Wheezing. Rescue, Disp: 18 g, Rfl: 2    clobetasoL (TEMOVATE) 0.05 % external solution, Apply topically 2 (two) times daily. for 5 days, Disp: 25 mL, Rfl: 0    fluticasone propionate (FLONASE) 50 mcg/actuation nasal spray, 1 spray (50 mcg total) by Each Nostril route once daily., Disp: 16 g, Rfl: 2    insulin degludec (TRESIBA FLEXTOUCH U-200) 200 unit/mL (3 mL) insulin pen, Inject 8 Units into the skin once daily., Disp: 2 pen , Rfl: 0    lancets (E-Z JECT LANCETS) 32 gauge Misc, 100 lancets by Misc.(Non-Drug; Combo Route) route. USE TO CHECK GLUCOSE ONCE DAILY, Disp: , Rfl:     lovastatin (MEVACOR) 20 MG tablet, Take 1 tablet (20 mg total) by mouth once daily., Disp: 90 tablet, Rfl: 0    metFORMIN (GLUCOPHAGE) 500 MG tablet, Take 1 tablet (500 mg total) by mouth 2 (two) times daily with meals., Disp: 180 tablet, Rfl: 0    methotrexate 5 MG tablet, Take 12.5mg on Saturday and 12.5mg on Sunday max 25mg weekly, Disp: , Rfl:     pen needle, diabetic 32 gauge x 1/4" Ndle, Use one needle to inject insulin daily as prescribed, Disp: 100 each, Rfl: 5    predniSONE (DELTASONE) 10 MG tablet, Take 1.5 tablets by mouth Daily., Disp: , Rfl:     pregabalin (LYRICA) 25 MG capsule, Take 25 mg by mouth 3 (three) times daily., Disp: , Rfl:     pyridostigmine (MESTINON) 60 " "mg Tab, Take 60 mg by mouth 3 (three) times daily., Disp: , Rfl:     semaglutide (OZEMPIC) 1 mg/dose (4 mg/3 mL), Inject 1 mg into the skin every 7 days., Disp: 9 mL, Rfl: 0    Review of Systems   Constitutional:  Negative for appetite change, chills, fatigue, fever and unexpected weight change.   Respiratory:  Negative for cough and shortness of breath.    Cardiovascular:  Negative for chest pain, palpitations and leg swelling.   Gastrointestinal:  Negative for abdominal pain, change in bowel habit, constipation, diarrhea, nausea, vomiting and change in bowel habit.   Genitourinary:  Negative for dysuria and frequency.   Musculoskeletal:  Negative for arthralgias, gait problem and myalgias.   Integumentary:  Positive for rash.   Neurological:  Negative for dizziness, syncope, light-headedness and headaches.   Psychiatric/Behavioral:  Negative for dysphoric mood and sleep disturbance. The patient is not nervous/anxious.        Objective     /83   Pulse 90   Resp 14   Ht 5' 5" (1.651 m)   Wt 93.9 kg (207 lb)   SpO2 98%   BMI 34.45 kg/m²     Physical Exam   Constitutional: She is oriented to person, place, and time. No distress.   HENT:   Head: Atraumatic.   Mouth/Throat: Mucous membranes are moist.   Cardiovascular: Normal rate and regular rhythm. Pulmonary:      Effort: Pulmonary effort is normal. No respiratory distress.      Breath sounds: Normal breath sounds. No wheezing, rhonchi or rales.     Abdominal: Soft. Bowel sounds are normal. She exhibits no distension. There is no abdominal tenderness.   Musculoskeletal:         General: Normal range of motion.      Cervical back: Neck supple.      Right lower leg: No edema.      Left lower leg: No edema.   Neurological: She is alert and oriented to person, place, and time. Gait normal.   Skin: Skin is warm and dry.        Papular rash around hairline of scalp, no s/s of infection, no erythema no warmth    Psychiatric: Her behavior is normal. Mood normal. "       Lab Results   Component Value Date    WBC 4.66 12/28/2022    HGB 13.1 12/28/2022    HCT 38.1 12/28/2022    MCV 92.9 12/28/2022     12/28/2022       CMP  Sodium   Date Value Ref Range Status   12/28/2022 144 136 - 145 mmol/L Final     Potassium   Date Value Ref Range Status   12/28/2022 4.3 3.5 - 5.1 mmol/L Final     Chloride   Date Value Ref Range Status   12/28/2022 107 98 - 107 mmol/L Final     CO2   Date Value Ref Range Status   12/28/2022 30 21 - 32 mmol/L Final     Glucose   Date Value Ref Range Status   12/28/2022 117 (H) 74 - 106 mg/dL Final     BUN   Date Value Ref Range Status   12/28/2022 8 7 - 18 mg/dL Final     Creatinine   Date Value Ref Range Status   12/28/2022 0.84 0.55 - 1.02 mg/dL Final     Calcium   Date Value Ref Range Status   12/28/2022 8.0 (L) 8.5 - 10.1 mg/dL Final     Total Protein   Date Value Ref Range Status   12/28/2022 7.1 6.4 - 8.2 g/dL Final     Albumin   Date Value Ref Range Status   12/28/2022 3.7 3.5 - 5.0 g/dL Final     Bilirubin, Total   Date Value Ref Range Status   12/28/2022 0.4 >0.0 - 1.2 mg/dL Final     Alk Phos   Date Value Ref Range Status   12/28/2022 105 (H) 39 - 100 U/L Final     AST   Date Value Ref Range Status   12/28/2022 17 15 - 37 U/L Final     ALT   Date Value Ref Range Status   12/28/2022 24 13 - 56 U/L Final     Anion Gap   Date Value Ref Range Status   12/28/2022 11 7 - 16 mmol/L Final     eGFR    Date Value Ref Range Status   08/13/2021 67 >=60 mL/min/1.73m² Final     eGFR   Date Value Ref Range Status   04/13/2022 61 >=60 mL/min/1.73m² Final     Lab Results   Component Value Date    TSH 0.766 08/13/2021     Lab Results   Component Value Date    CHOL 232 (H) 06/12/2023    CHOL 216 (H) 04/13/2022    CHOL 250 (H) 04/19/2021     Lab Results   Component Value Date    HDL 67 (H) 06/12/2023    HDL 58 04/13/2022    HDL 54 04/19/2021     Lab Results   Component Value Date    LDLCALC 143 06/12/2023    LDLCALC 137 04/13/2022    LDLCALC 166  04/19/2021     Lab Results   Component Value Date    TRIG 112 06/12/2023    TRIG 106 04/13/2022    TRIG 150 04/19/2021     Lab Results   Component Value Date    CHOLHDL 3.5 06/12/2023    CHOLHDL 3.7 04/13/2022    CHOLHDL 4.6 04/19/2021     Lab Results   Component Value Date    HGBA1C 12.3 (H) 06/12/2023         Assessment and Plan   Pruritic dermatosis of scalp  -     clobetasoL (TEMOVATE) 0.05 % external solution; Apply topically 2 (two) times daily. for 5 days  Dispense: 25 mL; Refill: 0          Patient Instructions  Patient Instructions   Clobetasol solution to scalp two times daily for five days     Follow up in one week if symptoms persist, sooner if symptoms worsen     A1c will be due again 9/10/2023, follow up then

## 2023-08-31 ENCOUNTER — TELEPHONE (OUTPATIENT)
Dept: FAMILY MEDICINE | Facility: CLINIC | Age: 48
End: 2023-08-31
Payer: COMMERCIAL

## 2023-08-31 NOTE — TELEPHONE ENCOUNTER
----- Message from Erica Ca sent at 8/31/2023  2:38 PM CDT -----  Pt wanted melvin to know she is still itching

## 2023-09-17 ENCOUNTER — HOSPITAL ENCOUNTER (EMERGENCY)
Facility: HOSPITAL | Age: 48
Discharge: HOME | End: 2023-09-17
Attending: FAMILY MEDICINE
Payer: COMMERCIAL

## 2023-09-17 VITALS
HEART RATE: 86 BPM | TEMPERATURE: 99 F | HEIGHT: 65 IN | RESPIRATION RATE: 19 BRPM | BODY MASS INDEX: 33.82 KG/M2 | SYSTOLIC BLOOD PRESSURE: 127 MMHG | DIASTOLIC BLOOD PRESSURE: 77 MMHG | OXYGEN SATURATION: 100 % | WEIGHT: 203 LBS

## 2023-09-17 DIAGNOSIS — R07.9 CHEST PAIN: ICD-10-CM

## 2023-09-17 DIAGNOSIS — L02.91 ABSCESS: Primary | ICD-10-CM

## 2023-09-17 PROCEDURE — 96372 THER/PROPH/DIAG INJ SC/IM: CPT | Performed by: FAMILY MEDICINE

## 2023-09-17 PROCEDURE — 99284 EMERGENCY DEPT VISIT MOD MDM: CPT | Mod: ,,, | Performed by: FAMILY MEDICINE

## 2023-09-17 PROCEDURE — 99284 PR EMERGENCY DEPT VISIT,LEVEL IV: ICD-10-PCS | Mod: ,,, | Performed by: FAMILY MEDICINE

## 2023-09-17 PROCEDURE — 93005 ELECTROCARDIOGRAM TRACING: CPT

## 2023-09-17 PROCEDURE — 63600175 PHARM REV CODE 636 W HCPCS: Performed by: FAMILY MEDICINE

## 2023-09-17 PROCEDURE — 99284 EMERGENCY DEPT VISIT MOD MDM: CPT

## 2023-09-17 PROCEDURE — 93010 ELECTROCARDIOGRAM REPORT: CPT | Mod: ,,, | Performed by: HOSPITALIST

## 2023-09-17 PROCEDURE — 93010 EKG 12-LEAD: ICD-10-PCS | Mod: ,,, | Performed by: HOSPITALIST

## 2023-09-17 RX ORDER — PROMETHAZINE HYDROCHLORIDE 25 MG/ML
25 INJECTION, SOLUTION INTRAMUSCULAR; INTRAVENOUS
Status: COMPLETED | OUTPATIENT
Start: 2023-09-17 | End: 2023-09-17

## 2023-09-17 RX ORDER — CEFTRIAXONE 1 G/1
1 INJECTION, POWDER, FOR SOLUTION INTRAMUSCULAR; INTRAVENOUS
Status: COMPLETED | OUTPATIENT
Start: 2023-09-17 | End: 2023-09-17

## 2023-09-17 RX ORDER — MORPHINE SULFATE 4 MG/ML
4 INJECTION, SOLUTION INTRAMUSCULAR; INTRAVENOUS
Status: COMPLETED | OUTPATIENT
Start: 2023-09-17 | End: 2023-09-17

## 2023-09-17 RX ORDER — HYDROCODONE BITARTRATE AND ACETAMINOPHEN 5; 325 MG/1; MG/1
1 TABLET ORAL EVERY 6 HOURS PRN
Qty: 12 TABLET | Refills: 0 | Status: SHIPPED | OUTPATIENT
Start: 2023-09-17 | End: 2024-02-16 | Stop reason: CLARIF

## 2023-09-17 RX ORDER — SULFAMETHOXAZOLE AND TRIMETHOPRIM 800; 160 MG/1; MG/1
1 TABLET ORAL 2 TIMES DAILY
Qty: 14 TABLET | Refills: 0 | Status: SHIPPED | OUTPATIENT
Start: 2023-09-17 | End: 2023-09-24

## 2023-09-17 RX ORDER — ONDANSETRON 4 MG/1
4 TABLET, FILM COATED ORAL EVERY 6 HOURS
Qty: 12 TABLET | Refills: 0 | Status: SHIPPED | OUTPATIENT
Start: 2023-09-17 | End: 2024-01-24 | Stop reason: ALTCHOICE

## 2023-09-17 RX ADMIN — MORPHINE SULFATE 4 MG: 4 INJECTION, SOLUTION INTRAMUSCULAR; INTRAVENOUS at 09:09

## 2023-09-17 RX ADMIN — CEFTRIAXONE 1 G: 1 INJECTION, POWDER, FOR SOLUTION INTRAMUSCULAR; INTRAVENOUS at 09:09

## 2023-09-17 RX ADMIN — PROMETHAZINE HYDROCHLORIDE 25 MG: 25 INJECTION INTRAMUSCULAR; INTRAVENOUS at 09:09

## 2023-09-17 NOTE — ED PROVIDER NOTES
Encounter Date: 9/17/2023       History     Chief Complaint   Patient presents with    Chest Pain     Patient states she has a breast abscess headache just feeling weak all over.  Chest pain.  History of lupus.  Gets weekly infusions for her lupus.  No nausea vomiting diarrhea.        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 Added automatically from request for surgery [id number]     Past Surgical History:   Procedure Laterality Date    DILATION AND CURETTAGE OF UTERUS  2000    preformed at [facility]    HYSTERECTOMY      OOPHORECTOMY      pt has had one ovary removed, unsure side    TUBAL LIGATION  1999    [facility]     Family History   Problem Relation Age of Onset    Asthma Mother     Coronary artery disease Mother     Diabetes Father     Heart disease Father     Cancer Sister         Breast    Breast cancer Sister      Social History     Tobacco Use    Smoking status: Never     Passive exposure: Never    Smokeless tobacco: Current     Types: Snuff   Substance Use Topics    Alcohol use: Never    Drug use: Never     Review of Systems   Constitutional: Negative.  Negative for fever.   HENT: Negative.  Negative for sore throat.    Eyes: Negative.    Respiratory: Negative.  Negative for shortness of breath.    Cardiovascular:  Positive for chest pain.   Gastrointestinal: Negative.  Negative for nausea.   Endocrine: Negative.    Genitourinary: Negative.  Negative for dysuria.   Musculoskeletal: Negative.  Negative for back pain.   Skin: Negative.  Negative for rash.        Patient with small circular abscess of the right breast.  Is been draining.  Sign of induration is there   Allergic/Immunologic: Negative.    Neurological: Negative.  Negative for  weakness.   Hematological: Negative.  Does not bruise/bleed easily.   Psychiatric/Behavioral: Negative.     All other systems reviewed and are negative.      Physical Exam     Initial Vitals [09/17/23 0812]   BP Pulse Resp Temp SpO2   127/77 86 20 99.2 °F (37.3 °C) 100 %      MAP       --         Physical Exam    Constitutional: She appears well-developed and well-nourished.   HENT:   Head: Normocephalic and atraumatic.   Right Ear: External ear normal.   Left Ear: External ear normal.   Nose: Nose normal.   Mouth/Throat: Oropharynx is clear and moist.   Eyes: Conjunctivae and EOM are normal. Pupils are equal, round, and reactive to light.   Neck: Neck supple.   Normal range of motion.  Cardiovascular:  Normal rate, regular rhythm, normal heart sounds and intact distal pulses.           Pulmonary/Chest: Breath sounds normal.   Abdominal: Abdomen is soft. Bowel sounds are normal.   Genitourinary:    Vagina and uterus normal.     Musculoskeletal:         General: Normal range of motion.      Cervical back: Normal range of motion and neck supple.     Neurological: She is alert and oriented to person, place, and time. She has normal strength and normal reflexes.   Skin: Skin is warm. Capillary refill takes less than 2 seconds.   Patient with circular abscess and cellulitis of a draining wound to the right breast.  Patient also has some axillary involvement and lymph node   Psychiatric: She has a normal mood and affect. Her behavior is normal. Judgment and thought content normal.         Medical Screening Exam   See Full Note    ED Course   Procedures  Labs Reviewed - No data to display       Imaging Results    None          Medications   morphine injection 4 mg (4 mg Intramuscular Given 9/17/23 0911)   promethazine injection 25 mg (25 mg Intramuscular Given 9/17/23 0911)   cefTRIAXone injection 1 g (1 g Intramuscular Given 9/17/23 0911)     Medical Decision Making  Risk  Prescription drug management.                           Medical Decision Making:   Initial Assessment:   Patient comes in with abscess right breast.  Is a small circular erythematous area.  Tender to palpate.  Differential Diagnosis:   Small circular abscess with cellulitis in around the right breast that has induration and drainage.  Lupus.  ED Management:  Med Rocephin 1 g IM.  Bactrim ds 1 p.o. b.i.d. pain management with Norco      Clinical Impression:   Final diagnoses:  [R07.9] Chest pain  [L02.91] Abscess (Primary)        ED Disposition Condition    Discharge Stable          ED Prescriptions       Medication Sig Dispense Start Date End Date Auth. Provider    sulfamethoxazole-trimethoprim 800-160mg (BACTRIM DS) 800-160 mg Tab Take 1 tablet by mouth 2 (two) times daily. for 7 days 14 tablet 9/17/2023 9/24/2023 Chris Recinos, DO    HYDROcodone-acetaminophen (NORCO) 5-325 mg per tablet Take 1 tablet by mouth every 6 (six) hours as needed for Pain. 12 tablet 9/17/2023 -- Chris Recinos DO    ondansetron (ZOFRAN) 4 MG tablet Take 1 tablet (4 mg total) by mouth every 6 (six) hours. 12 tablet 9/17/2023 -- Chris Recinos DO          Follow-up Information    None          Chris Recinos,   09/19/23 0763

## 2023-09-21 DIAGNOSIS — E11.9 TYPE 2 DIABETES MELLITUS WITHOUT COMPLICATION, WITHOUT LONG-TERM CURRENT USE OF INSULIN: ICD-10-CM

## 2023-09-21 DIAGNOSIS — I10 HYPERTENSION, UNSPECIFIED TYPE: ICD-10-CM

## 2023-09-21 DIAGNOSIS — E11.9 TYPE 2 DIABETES MELLITUS WITHOUT COMPLICATION, WITH LONG-TERM CURRENT USE OF INSULIN: ICD-10-CM

## 2023-09-21 DIAGNOSIS — J30.2 SEASONAL ALLERGIES: ICD-10-CM

## 2023-09-21 DIAGNOSIS — Z79.4 TYPE 2 DIABETES MELLITUS WITHOUT COMPLICATION, WITH LONG-TERM CURRENT USE OF INSULIN: ICD-10-CM

## 2023-09-21 RX ORDER — SEMAGLUTIDE 1.34 MG/ML
1 INJECTION, SOLUTION SUBCUTANEOUS
Qty: 9 ML | Refills: 0 | Status: SHIPPED | OUTPATIENT
Start: 2023-09-21 | End: 2023-10-11 | Stop reason: SDUPTHER

## 2023-09-21 RX ORDER — FLUTICASONE PROPIONATE 50 MCG
1 SPRAY, SUSPENSION (ML) NASAL DAILY
Qty: 16 G | Refills: 2 | Status: SHIPPED | OUTPATIENT
Start: 2023-09-21 | End: 2023-10-11 | Stop reason: SDUPTHER

## 2023-09-21 RX ORDER — METFORMIN HYDROCHLORIDE 500 MG/1
500 TABLET ORAL 2 TIMES DAILY WITH MEALS
Qty: 180 TABLET | Refills: 0 | Status: SHIPPED | OUTPATIENT
Start: 2023-09-21 | End: 2023-10-11 | Stop reason: SDUPTHER

## 2023-09-21 RX ORDER — BLOOD SUGAR DIAGNOSTIC
STRIP MISCELLANEOUS
Qty: 100 EACH | Refills: 5 | Status: SHIPPED | OUTPATIENT
Start: 2023-09-21

## 2023-09-21 RX ORDER — INSULIN DEGLUDEC 200 U/ML
8 INJECTION, SOLUTION SUBCUTANEOUS DAILY
Qty: 2 PEN | Refills: 0 | Status: SHIPPED | OUTPATIENT
Start: 2023-09-21 | End: 2023-10-11 | Stop reason: SDUPTHER

## 2023-09-21 RX ORDER — LOVASTATIN 20 MG/1
20 TABLET ORAL DAILY
Qty: 90 TABLET | Refills: 0 | Status: SHIPPED | OUTPATIENT
Start: 2023-09-21 | End: 2023-10-11 | Stop reason: SDUPTHER

## 2023-10-11 ENCOUNTER — OFFICE VISIT (OUTPATIENT)
Dept: FAMILY MEDICINE | Facility: CLINIC | Age: 48
End: 2023-10-11
Payer: COMMERCIAL

## 2023-10-11 VITALS
DIASTOLIC BLOOD PRESSURE: 78 MMHG | BODY MASS INDEX: 34.29 KG/M2 | HEIGHT: 65 IN | RESPIRATION RATE: 13 BRPM | WEIGHT: 205.81 LBS | HEART RATE: 98 BPM | OXYGEN SATURATION: 98 % | SYSTOLIC BLOOD PRESSURE: 128 MMHG

## 2023-10-11 DIAGNOSIS — Z72.0 SMOKELESS TOBACCO USE: ICD-10-CM

## 2023-10-11 DIAGNOSIS — Z23 NEEDS FLU SHOT: ICD-10-CM

## 2023-10-11 DIAGNOSIS — Z23 PNEUMOCOCCAL VACCINATION ADMINISTERED AT CURRENT VISIT: ICD-10-CM

## 2023-10-11 DIAGNOSIS — I10 HYPERTENSION, UNSPECIFIED TYPE: ICD-10-CM

## 2023-10-11 DIAGNOSIS — F43.21 GRIEVING: ICD-10-CM

## 2023-10-11 DIAGNOSIS — G47.00 INSOMNIA, UNSPECIFIED TYPE: ICD-10-CM

## 2023-10-11 DIAGNOSIS — J30.2 SEASONAL ALLERGIES: ICD-10-CM

## 2023-10-11 DIAGNOSIS — Z79.4 TYPE 2 DIABETES MELLITUS WITHOUT COMPLICATION, WITH LONG-TERM CURRENT USE OF INSULIN: Primary | ICD-10-CM

## 2023-10-11 DIAGNOSIS — E11.9 TYPE 2 DIABETES MELLITUS WITHOUT COMPLICATION, WITH LONG-TERM CURRENT USE OF INSULIN: Primary | ICD-10-CM

## 2023-10-11 DIAGNOSIS — M32.9 SYSTEMIC LUPUS ERYTHEMATOSUS, UNSPECIFIED SLE TYPE, UNSPECIFIED ORGAN INVOLVEMENT STATUS: ICD-10-CM

## 2023-10-11 DIAGNOSIS — K21.9 GASTROESOPHAGEAL REFLUX DISEASE, UNSPECIFIED WHETHER ESOPHAGITIS PRESENT: ICD-10-CM

## 2023-10-11 DIAGNOSIS — E78.5 HYPERLIPIDEMIA, UNSPECIFIED HYPERLIPIDEMIA TYPE: ICD-10-CM

## 2023-10-11 LAB
ALBUMIN SERPL BCP-MCNC: 3.4 G/DL (ref 3.5–5)
ALBUMIN/GLOB SERPL: 0.6 {RATIO}
ALP SERPL-CCNC: 88 U/L (ref 41–108)
ALT SERPL W P-5'-P-CCNC: 30 U/L (ref 13–56)
ANION GAP SERPL CALCULATED.3IONS-SCNC: 10 MMOL/L (ref 7–16)
AST SERPL W P-5'-P-CCNC: 19 U/L (ref 15–37)
BASOPHILS # BLD AUTO: 0.02 K/UL (ref 0–0.2)
BASOPHILS NFR BLD AUTO: 0.4 % (ref 0–1)
BILIRUB SERPL-MCNC: 0.2 MG/DL (ref ?–1.2)
BUN SERPL-MCNC: 11 MG/DL (ref 7–18)
BUN/CREAT SERPL: 9 (ref 6–20)
CALCIUM SERPL-MCNC: 9.7 MG/DL (ref 8.5–10.1)
CHLORIDE SERPL-SCNC: 100 MMOL/L (ref 98–107)
CO2 SERPL-SCNC: 27 MMOL/L (ref 21–32)
CREAT SERPL-MCNC: 1.19 MG/DL (ref 0.55–1.02)
DIFFERENTIAL METHOD BLD: ABNORMAL
EGFR (NO RACE VARIABLE) (RUSH/TITUS): 56 ML/MIN/1.73M2
EOSINOPHIL # BLD AUTO: 0 K/UL (ref 0–0.5)
EOSINOPHIL NFR BLD AUTO: 0 % (ref 1–4)
ERYTHROCYTE [DISTWIDTH] IN BLOOD BY AUTOMATED COUNT: 13.1 % (ref 11.5–14.5)
EST. AVERAGE GLUCOSE BLD GHB EST-MCNC: 264 MG/DL
GLOBULIN SER-MCNC: 5.3 G/DL (ref 2–4)
GLUCOSE SERPL-MCNC: 415 MG/DL (ref 74–106)
HBA1C MFR BLD HPLC: 10.5 % (ref 4.5–6.6)
HCT VFR BLD AUTO: 38.1 % (ref 38–47)
HGB BLD-MCNC: 13.1 G/DL (ref 12–16)
IMM GRANULOCYTES # BLD AUTO: 0.02 K/UL (ref 0–0.04)
IMM GRANULOCYTES NFR BLD: 0.4 % (ref 0–0.4)
LYMPHOCYTES # BLD AUTO: 0.87 K/UL (ref 1–4.8)
LYMPHOCYTES NFR BLD AUTO: 16.6 % (ref 27–41)
MCH RBC QN AUTO: 32 PG (ref 27–31)
MCHC RBC AUTO-ENTMCNC: 34.4 G/DL (ref 32–36)
MCV RBC AUTO: 92.9 FL (ref 80–96)
MONOCYTES # BLD AUTO: 0.26 K/UL (ref 0–0.8)
MONOCYTES NFR BLD AUTO: 5 % (ref 2–6)
MPC BLD CALC-MCNC: 11.7 FL (ref 9.4–12.4)
NEUTROPHILS # BLD AUTO: 4.08 K/UL (ref 1.8–7.7)
NEUTROPHILS NFR BLD AUTO: 77.6 % (ref 53–65)
NRBC # BLD AUTO: 0 X10E3/UL
NRBC, AUTO (.00): 0 %
PLATELET # BLD AUTO: 212 K/UL (ref 150–400)
POTASSIUM SERPL-SCNC: 4.9 MMOL/L (ref 3.5–5.1)
PROT SERPL-MCNC: 8.7 G/DL (ref 6.4–8.2)
RBC # BLD AUTO: 4.1 M/UL (ref 4.2–5.4)
SODIUM SERPL-SCNC: 132 MMOL/L (ref 136–145)
WBC # BLD AUTO: 5.25 K/UL (ref 4.5–11)

## 2023-10-11 PROCEDURE — 3074F PR MOST RECENT SYSTOLIC BLOOD PRESSURE < 130 MM HG: ICD-10-PCS | Mod: ,,, | Performed by: NURSE PRACTITIONER

## 2023-10-11 PROCEDURE — G0008 ADMIN INFLUENZA VIRUS VAC: HCPCS | Mod: ,,, | Performed by: NURSE PRACTITIONER

## 2023-10-11 PROCEDURE — G0009 PNEUMOCOCCAL CONJUGATE VACCINE 20-VALENT: ICD-10-PCS | Mod: ,,, | Performed by: NURSE PRACTITIONER

## 2023-10-11 PROCEDURE — 3078F PR MOST RECENT DIASTOLIC BLOOD PRESSURE < 80 MM HG: ICD-10-PCS | Mod: ,,, | Performed by: NURSE PRACTITIONER

## 2023-10-11 PROCEDURE — 80053 COMPREHENSIVE METABOLIC PANEL: ICD-10-PCS | Mod: ,,, | Performed by: CLINICAL MEDICAL LABORATORY

## 2023-10-11 PROCEDURE — 1159F PR MEDICATION LIST DOCUMENTED IN MEDICAL RECORD: ICD-10-PCS | Mod: ,,, | Performed by: NURSE PRACTITIONER

## 2023-10-11 PROCEDURE — 80053 COMPREHEN METABOLIC PANEL: CPT | Mod: ,,, | Performed by: CLINICAL MEDICAL LABORATORY

## 2023-10-11 PROCEDURE — 3046F PR MOST RECENT HEMOGLOBIN A1C LEVEL > 9.0%: ICD-10-PCS | Mod: ,,, | Performed by: NURSE PRACTITIONER

## 2023-10-11 PROCEDURE — 85025 COMPLETE CBC W/AUTO DIFF WBC: CPT | Mod: ,,, | Performed by: CLINICAL MEDICAL LABORATORY

## 2023-10-11 PROCEDURE — 3061F NEG MICROALBUMINURIA REV: CPT | Mod: ,,, | Performed by: NURSE PRACTITIONER

## 2023-10-11 PROCEDURE — 99214 OFFICE O/P EST MOD 30 MIN: CPT | Mod: ,,, | Performed by: NURSE PRACTITIONER

## 2023-10-11 PROCEDURE — 3074F SYST BP LT 130 MM HG: CPT | Mod: ,,, | Performed by: NURSE PRACTITIONER

## 2023-10-11 PROCEDURE — 3008F PR BODY MASS INDEX (BMI) DOCUMENTED: ICD-10-PCS | Mod: ,,, | Performed by: NURSE PRACTITIONER

## 2023-10-11 PROCEDURE — 85025 CBC WITH DIFFERENTIAL: ICD-10-PCS | Mod: ,,, | Performed by: CLINICAL MEDICAL LABORATORY

## 2023-10-11 PROCEDURE — 90677 PNEUMOCOCCAL CONJUGATE VACCINE 20-VALENT: ICD-10-PCS | Mod: ,,, | Performed by: NURSE PRACTITIONER

## 2023-10-11 PROCEDURE — 3078F DIAST BP <80 MM HG: CPT | Mod: ,,, | Performed by: NURSE PRACTITIONER

## 2023-10-11 PROCEDURE — 1160F RVW MEDS BY RX/DR IN RCRD: CPT | Mod: ,,, | Performed by: NURSE PRACTITIONER

## 2023-10-11 PROCEDURE — 3046F HEMOGLOBIN A1C LEVEL >9.0%: CPT | Mod: ,,, | Performed by: NURSE PRACTITIONER

## 2023-10-11 PROCEDURE — 90677 PCV20 VACCINE IM: CPT | Mod: ,,, | Performed by: NURSE PRACTITIONER

## 2023-10-11 PROCEDURE — 83036 HEMOGLOBIN GLYCOSYLATED A1C: CPT | Mod: ,,, | Performed by: CLINICAL MEDICAL LABORATORY

## 2023-10-11 PROCEDURE — G0008 FLU VACCINE (QUAD) GREATER THAN OR EQUAL TO 3YO PRESERVATIVE FREE IM: ICD-10-PCS | Mod: ,,, | Performed by: NURSE PRACTITIONER

## 2023-10-11 PROCEDURE — 3008F BODY MASS INDEX DOCD: CPT | Mod: ,,, | Performed by: NURSE PRACTITIONER

## 2023-10-11 PROCEDURE — 3061F PR NEG MICROALBUMINURIA RESULT DOCUMENTED/REVIEW: ICD-10-PCS | Mod: ,,, | Performed by: NURSE PRACTITIONER

## 2023-10-11 PROCEDURE — 1159F MED LIST DOCD IN RCRD: CPT | Mod: ,,, | Performed by: NURSE PRACTITIONER

## 2023-10-11 PROCEDURE — 90686 IIV4 VACC NO PRSV 0.5 ML IM: CPT | Mod: ,,, | Performed by: NURSE PRACTITIONER

## 2023-10-11 PROCEDURE — 83036 HEMOGLOBIN A1C: ICD-10-PCS | Mod: ,,, | Performed by: CLINICAL MEDICAL LABORATORY

## 2023-10-11 PROCEDURE — 3066F PR DOCUMENTATION OF TREATMENT FOR NEPHROPATHY: ICD-10-PCS | Mod: ,,, | Performed by: NURSE PRACTITIONER

## 2023-10-11 PROCEDURE — 3066F NEPHROPATHY DOC TX: CPT | Mod: ,,, | Performed by: NURSE PRACTITIONER

## 2023-10-11 PROCEDURE — 1160F PR REVIEW ALL MEDS BY PRESCRIBER/CLIN PHARMACIST DOCUMENTED: ICD-10-PCS | Mod: ,,, | Performed by: NURSE PRACTITIONER

## 2023-10-11 PROCEDURE — 99214 PR OFFICE/OUTPT VISIT, EST, LEVL IV, 30-39 MIN: ICD-10-PCS | Mod: ,,, | Performed by: NURSE PRACTITIONER

## 2023-10-11 PROCEDURE — G0009 ADMIN PNEUMOCOCCAL VACCINE: HCPCS | Mod: ,,, | Performed by: NURSE PRACTITIONER

## 2023-10-11 PROCEDURE — 90686 FLU VACCINE (QUAD) GREATER THAN OR EQUAL TO 3YO PRESERVATIVE FREE IM: ICD-10-PCS | Mod: ,,, | Performed by: NURSE PRACTITIONER

## 2023-10-11 RX ORDER — IMMUNE GLOBULIN INTRAVENOUS (HUMAN) 100 MG/ML
SOLUTION INTRAVENOUS
COMMUNITY
Start: 2023-10-04

## 2023-10-11 RX ORDER — PEN NEEDLE, DIABETIC 31 GX3/16"
NEEDLE, DISPOSABLE MISCELLANEOUS
COMMUNITY
Start: 2023-09-21

## 2023-10-11 RX ORDER — LOVASTATIN 20 MG/1
20 TABLET ORAL DAILY
Qty: 90 TABLET | Refills: 0 | Status: SHIPPED | OUTPATIENT
Start: 2023-10-11 | End: 2024-01-23 | Stop reason: SDUPTHER

## 2023-10-11 RX ORDER — HYDROXYZINE HYDROCHLORIDE 25 MG/1
25 TABLET, FILM COATED ORAL EVERY 8 HOURS PRN
COMMUNITY
Start: 2023-09-14 | End: 2024-01-24 | Stop reason: ALTCHOICE

## 2023-10-11 RX ORDER — PREGABALIN 50 MG/1
50 CAPSULE ORAL 3 TIMES DAILY
COMMUNITY
Start: 2023-09-14 | End: 2024-01-24 | Stop reason: ALTCHOICE

## 2023-10-11 RX ORDER — LANCETS 28 GAUGE
EACH MISCELLANEOUS
COMMUNITY
Start: 2023-07-20

## 2023-10-11 RX ORDER — SEMAGLUTIDE 1.34 MG/ML
1 INJECTION, SOLUTION SUBCUTANEOUS
Qty: 9 ML | Refills: 0 | Status: SHIPPED | OUTPATIENT
Start: 2023-10-11 | End: 2023-10-24 | Stop reason: SDUPTHER

## 2023-10-11 RX ORDER — FLUTICASONE PROPIONATE 50 MCG
1 SPRAY, SUSPENSION (ML) NASAL DAILY
Qty: 16 G | Refills: 2 | Status: SHIPPED | OUTPATIENT
Start: 2023-10-11 | End: 2024-01-23 | Stop reason: SDUPTHER

## 2023-10-11 RX ORDER — TRAZODONE HYDROCHLORIDE 50 MG/1
TABLET ORAL
Qty: 30 TABLET | Refills: 2 | Status: SHIPPED | OUTPATIENT
Start: 2023-10-11 | End: 2024-01-23 | Stop reason: SDUPTHER

## 2023-10-11 RX ORDER — METFORMIN HYDROCHLORIDE 500 MG/1
500 TABLET ORAL 2 TIMES DAILY WITH MEALS
Qty: 180 TABLET | Refills: 0 | Status: SHIPPED | OUTPATIENT
Start: 2023-10-11 | End: 2024-01-23 | Stop reason: SDUPTHER

## 2023-10-11 RX ORDER — INSULIN DEGLUDEC 200 U/ML
8 INJECTION, SOLUTION SUBCUTANEOUS DAILY
Qty: 2 PEN | Refills: 0 | Status: SHIPPED | OUTPATIENT
Start: 2023-10-11 | End: 2024-01-09

## 2023-10-11 NOTE — PATIENT INSTRUCTIONS
Lab obtained in clinic today, we will notify you of results and any necessary changes to plan of care   Refills on routine medications   Follow up on  01/03/2024 and as needed; routine medication will be due 01/09/2024        Pt is advised to monitor and document glucose fasting when you wake up before you eat and 2 hours after meal and bring bg log in to next ov   Ensure to take medications as directed.    Follow diabetic diet as directed.    Work to achieve normal body weight.   Ensure to exercise 4-5 times per week for 20 minutes.     Call clinic when you are ready for referral to counseling and we will take care of this for you

## 2023-10-11 NOTE — PROGRESS NOTES
"Clinic note     Patient name: Tequila Vázquez is a 48 y.o. female   Chief compliant   Chief Complaint   Patient presents with    Diabetes    Medication Refill       Subjective     History of present illness   In clinic for routine follow up and medication refills   She reports having trouble sleeping since her son was violently shot and killed two months ago, discussed referral for counseling, she states she is "not quite ready for this yet". She denies any SI/HI. She will notify clinic when ready to proceed with counseling  Will trial trazodone for insomnia, dosing and possible side effects discussed      Past Medical History: asthma, DM type 2, latent syphilis, myasthenia gravis, hyperlipidemia     Last A1c 12.3, this will be rechecked today   Checking blood glucose BID and prn  Blood glucose log : not in clinic  Glucometer: does not have in clinic        Hx of right eyelid ptosis and weakness of RUE and RLE, myasthenia gravis; recently seen by [facility] neurology,  Dr Desir and ANDREA Rogers, NP-C at  neurology [facility]     She reports results of recent muscle biopsy indicated SLE, she has been started on methotrexate, prednisone continued; she is also taking IVIG infusions           Social History     Tobacco Use    Smoking status: Never     Passive exposure: Never    Smokeless tobacco: Current     Types: Snuff   Substance Use Topics    Alcohol use: Never    Drug use: Never       Review of patient's allergies indicates:   Allergen Reactions    Aspirin Hives       Past Medical History:   Diagnosis Date    Asthma     Diabetes mellitus     Late syphilis, 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 Added automatically from request for surgery [id number]       Past Surgical History:   Procedure Laterality Date    DILATION AND CURETTAGE OF UTERUS  2000    preformed at [facility]    HYSTERECTOMY      OOPHORECTOMY      pt has had " "one ovary removed, unsure side    TUBAL LIGATION  1999    [facility]        Family History   Problem Relation Age of Onset    Asthma Mother     Coronary artery disease Mother     Diabetes Father     Heart disease Father     Cancer Sister         Breast    Breast cancer Sister          Current Outpatient Medications:     hydrOXYzine HCL (ATARAX) 25 MG tablet, Take 25 mg by mouth every 8 (eight) hours as needed., Disp: , Rfl:     PANZYGA 10 % Soln, Inject into the vein., Disp: , Rfl:     pregabalin (LYRICA) 50 MG capsule, Take 50 mg by mouth 3 (three) times daily., Disp: , Rfl:     albuterol (PROVENTIL/VENTOLIN HFA) 90 mcg/actuation inhaler, Inhale 2 puffs into the lungs every 6 (six) hours as needed for Wheezing. Rescue, Disp: 18 g, Rfl: 2    clobetasoL (TEMOVATE) 0.05 % external solution, Apply topically 2 (two) times daily. for 5 days, Disp: 25 mL, Rfl: 0    EASY TOUCH 31 gauge x 3/16" Ndle, USE one needle TO INJECT insulin ONCE DAILY as prescribed, Disp: , Rfl:     fluticasone propionate (FLONASE) 50 mcg/actuation nasal spray, 1 spray (50 mcg total) by Each Nostril route once daily., Disp: 16 g, Rfl: 2    HYDROcodone-acetaminophen (NORCO) 5-325 mg per tablet, Take 1 tablet by mouth every 6 (six) hours as needed for Pain., Disp: 12 tablet, Rfl: 0    insulin degludec (TRESIBA FLEXTOUCH U-200) 200 unit/mL (3 mL) insulin pen, Inject 8 Units into the skin once daily., Disp: 2 pen , Rfl: 0    lancets (E-Z JECT LANCETS) 32 gauge Misc, 100 lancets by Misc.(Non-Drug; Combo Route) route. USE TO CHECK GLUCOSE ONCE DAILY, Disp: , Rfl:     lovastatin (MEVACOR) 20 MG tablet, Take 1 tablet (20 mg total) by mouth once daily., Disp: 90 tablet, Rfl: 0    metFORMIN (GLUCOPHAGE) 500 MG tablet, Take 1 tablet (500 mg total) by mouth 2 (two) times daily with meals., Disp: 180 tablet, Rfl: 0    methotrexate 5 MG tablet, Take 12.5mg on Saturday and 12.5mg on Sunday max 25mg weekly, Disp: , Rfl:     ondansetron (ZOFRAN) 4 MG tablet, Take 1 " "tablet (4 mg total) by mouth every 6 (six) hours., Disp: 12 tablet, Rfl: 0    pen needle, diabetic 32 gauge x 1/4" Ndle, Use one needle to inject insulin daily as prescribed, Disp: 100 each, Rfl: 5    predniSONE (DELTASONE) 10 MG tablet, Take 1.5 tablets by mouth Daily., Disp: , Rfl:     pyridostigmine (MESTINON) 60 mg Tab, Take 60 mg by mouth 3 (three) times daily., Disp: , Rfl:     semaglutide (OZEMPIC) 1 mg/dose (4 mg/3 mL), Inject 1 mg into the skin every 7 days., Disp: 9 mL, Rfl: 0    traZODone (DESYREL) 50 MG tablet, Take 1/2 to one tablet PO HS prn sleeep, Disp: 30 tablet, Rfl: 2    TRUEPLUS PEN NEEDLE 32 gauge x 5/32" Ndle, SMARTSIG:injection Daily, Disp: , Rfl:     Review of Systems   Constitutional:  Negative for appetite change, chills, fatigue, fever and unexpected weight change.   Respiratory:  Negative for cough and shortness of breath.    Cardiovascular:  Negative for chest pain, palpitations and leg swelling.   Gastrointestinal:  Negative for abdominal pain, change in bowel habit, constipation, diarrhea, nausea and vomiting.   Genitourinary:  Negative for dysuria and frequency.   Musculoskeletal:  Negative for arthralgias, gait problem and myalgias.   Neurological:  Negative for dizziness, syncope, light-headedness and headaches.   Psychiatric/Behavioral:  Positive for dysphoric mood and sleep disturbance. Negative for hallucinations and suicidal ideas. The patient is nervous/anxious.        Objective     /78   Pulse 98   Resp 13   Ht 5' 5" (1.651 m)   Wt 93.4 kg (205 lb 12.8 oz)   SpO2 98%   BMI 34.25 kg/m²     Physical Exam   Constitutional: She is oriented to person, place, and time. She is cooperative. No distress.   HENT:   Head: Atraumatic.   Mouth/Throat: Mucous membranes are moist.   Cardiovascular: Normal rate and regular rhythm. Pulmonary:      Effort: Pulmonary effort is normal. No respiratory distress.      Breath sounds: Normal breath sounds. No wheezing, rhonchi or rales. "     Abdominal: Soft. Bowel sounds are normal. She exhibits no distension. There is no abdominal tenderness.   Musculoskeletal:         General: Normal range of motion.      Cervical back: Neck supple.      Right lower leg: No edema.      Left lower leg: No edema.   Neurological: She is alert and oriented to person, place, and time. Gait normal.   Skin: Skin is warm and dry.   Psychiatric: Her speech is normal and behavior is normal. Mood and thought content normal. Thought content is not paranoid. She expresses no homicidal and no suicidal ideation. She expresses no suicidal plans and no homicidal plans.   Tearful when talking about her son       Lab Results   Component Value Date    WBC 4.66 12/28/2022    HGB 13.1 12/28/2022    HCT 38.1 12/28/2022    MCV 92.9 12/28/2022     12/28/2022       CMP  Sodium   Date Value Ref Range Status   12/28/2022 144 136 - 145 mmol/L Final     Potassium   Date Value Ref Range Status   12/28/2022 4.3 3.5 - 5.1 mmol/L Final     Chloride   Date Value Ref Range Status   12/28/2022 107 98 - 107 mmol/L Final     CO2   Date Value Ref Range Status   12/28/2022 30 21 - 32 mmol/L Final     Glucose   Date Value Ref Range Status   12/28/2022 117 (H) 74 - 106 mg/dL Final     BUN   Date Value Ref Range Status   12/28/2022 8 7 - 18 mg/dL Final     Creatinine   Date Value Ref Range Status   12/28/2022 0.84 0.55 - 1.02 mg/dL Final     Calcium   Date Value Ref Range Status   12/28/2022 8.0 (L) 8.5 - 10.1 mg/dL Final     Total Protein   Date Value Ref Range Status   12/28/2022 7.1 6.4 - 8.2 g/dL Final     Albumin   Date Value Ref Range Status   12/28/2022 3.7 3.5 - 5.0 g/dL Final     Bilirubin, Total   Date Value Ref Range Status   12/28/2022 0.4 >0.0 - 1.2 mg/dL Final     Alk Phos   Date Value Ref Range Status   12/28/2022 105 (H) 39 - 100 U/L Final     AST   Date Value Ref Range Status   12/28/2022 17 15 - 37 U/L Final     ALT   Date Value Ref Range Status   12/28/2022 24 13 - 56 U/L Final      Anion Gap   Date Value Ref Range Status   12/28/2022 11 7 - 16 mmol/L Final     eGFR    Date Value Ref Range Status   08/13/2021 67 >=60 mL/min/1.73m² Final     eGFR   Date Value Ref Range Status   04/13/2022 61 >=60 mL/min/1.73m² Final     Lab Results   Component Value Date    TSH 0.766 08/13/2021     Lab Results   Component Value Date    CHOL 232 (H) 06/12/2023    CHOL 216 (H) 04/13/2022    CHOL 250 (H) 04/19/2021     Lab Results   Component Value Date    HDL 67 (H) 06/12/2023    HDL 58 04/13/2022    HDL 54 04/19/2021     Lab Results   Component Value Date    LDLCALC 143 06/12/2023    LDLCALC 137 04/13/2022    LDLCALC 166 04/19/2021     Lab Results   Component Value Date    TRIG 112 06/12/2023    TRIG 106 04/13/2022    TRIG 150 04/19/2021     Lab Results   Component Value Date    CHOLHDL 3.5 06/12/2023    CHOLHDL 3.7 04/13/2022    CHOLHDL 4.6 04/19/2021     Lab Results   Component Value Date    HGBA1C 12.3 (H) 06/12/2023         Assessment and Plan   Type 2 diabetes mellitus without complication, with long-term current use of insulin  -     Hemoglobin A1C; Future; Expected date: 10/11/2023  -     semaglutide (OZEMPIC) 1 mg/dose (4 mg/3 mL); Inject 1 mg into the skin every 7 days.  Dispense: 9 mL; Refill: 0  -     metFORMIN (GLUCOPHAGE) 500 MG tablet; Take 1 tablet (500 mg total) by mouth 2 (two) times daily with meals.  Dispense: 180 tablet; Refill: 0  -     insulin degludec (TRESIBA FLEXTOUCH U-200) 200 unit/mL (3 mL) insulin pen; Inject 8 Units into the skin once daily.  Dispense: 2 pen ; Refill: 0    Hypertension, unspecified type  -     Comprehensive Metabolic Panel; Future; Expected date: 10/11/2023  -     CBC Auto Differential; Future; Expected date: 10/11/2023  -     lovastatin (MEVACOR) 20 MG tablet; Take 1 tablet (20 mg total) by mouth once daily.  Dispense: 90 tablet; Refill: 0    Grieving    Insomnia, unspecified type  -     traZODone (DESYREL) 50 MG tablet; Take 1/2 to one tablet PO  HS prn sleeep  Dispense: 30 tablet; Refill: 2    Hyperlipidemia, unspecified hyperlipidemia type    Gastroesophageal reflux disease, unspecified whether esophagitis present    Systemic lupus erythematosus, unspecified SLE type, unspecified organ involvement status    Seasonal allergies  -     fluticasone propionate (FLONASE) 50 mcg/actuation nasal spray; 1 spray (50 mcg total) by Each Nostril route once daily.  Dispense: 16 g; Refill: 2    Pneumococcal vaccination administered at current visit  -     (In Office Administered) Pneumococcal Conjugate Vaccine (20 Valent) (IM) (Preferred)    Needs flu shot  -     Influenza - Quadrivalent (PF)    Smokeless tobacco use    Assistance with cessation of smokeless tobacco use was offered, including:  []  Medications  [x]  Counseling  [x]  Printed Information on cessation of smokeless tobacco use       Patient was counseled regarding use of smokeless tobacco for 3-10 minutes.        Patient Instructions  Patient Instructions   Lab obtained in clinic today, we will notify you of results and any necessary changes to plan of care   Refills on routine medications   Follow up on  01/03/2024 and as needed; routine medication will be due 01/09/2024        Pt is advised to monitor and document glucose fasting when you wake up before you eat and 2 hours after meal and bring bg log in to next ov   Ensure to take medications as directed.    Follow diabetic diet as directed.    Work to achieve normal body weight.   Ensure to exercise 4-5 times per week for 20 minutes.     Call clinic when you are ready for referral to counseling and we will take care of this for you

## 2023-10-18 ENCOUNTER — HOSPITAL ENCOUNTER (OUTPATIENT)
Dept: RADIOLOGY | Facility: HOSPITAL | Age: 48
Discharge: HOME | End: 2023-10-18
Payer: COMMERCIAL

## 2023-10-18 VITALS — BODY MASS INDEX: 33.82 KG/M2 | HEIGHT: 65 IN | WEIGHT: 203 LBS

## 2023-10-18 DIAGNOSIS — Z12.31 OTHER SCREENING MAMMOGRAM: ICD-10-CM

## 2023-10-18 PROCEDURE — 77067 SCR MAMMO BI INCL CAD: CPT | Mod: TC

## 2023-10-24 DIAGNOSIS — E11.9 TYPE 2 DIABETES MELLITUS WITHOUT COMPLICATION, WITH LONG-TERM CURRENT USE OF INSULIN: ICD-10-CM

## 2023-10-24 DIAGNOSIS — Z79.4 TYPE 2 DIABETES MELLITUS WITHOUT COMPLICATION, WITH LONG-TERM CURRENT USE OF INSULIN: ICD-10-CM

## 2023-10-24 RX ORDER — SEMAGLUTIDE 1.34 MG/ML
1 INJECTION, SOLUTION SUBCUTANEOUS
Qty: 3 ML | Refills: 2 | Status: SHIPPED | OUTPATIENT
Start: 2023-10-24 | End: 2024-01-23 | Stop reason: SDUPTHER

## 2024-01-04 ENCOUNTER — OFFICE VISIT (OUTPATIENT)
Dept: FAMILY MEDICINE | Facility: CLINIC | Age: 49
End: 2024-01-04
Payer: COMMERCIAL

## 2024-01-04 VITALS
BODY MASS INDEX: 33.26 KG/M2 | RESPIRATION RATE: 12 BRPM | HEART RATE: 102 BPM | OXYGEN SATURATION: 99 % | HEIGHT: 65 IN | DIASTOLIC BLOOD PRESSURE: 77 MMHG | SYSTOLIC BLOOD PRESSURE: 116 MMHG | WEIGHT: 199.63 LBS

## 2024-01-04 DIAGNOSIS — F43.21 GRIEVING: ICD-10-CM

## 2024-01-04 DIAGNOSIS — Z12.11 SCREENING FOR COLON CANCER: Primary | ICD-10-CM

## 2024-01-04 DIAGNOSIS — Z79.4 TYPE 2 DIABETES MELLITUS WITH HYPERGLYCEMIA, WITH LONG-TERM CURRENT USE OF INSULIN: ICD-10-CM

## 2024-01-04 DIAGNOSIS — Z00.00 ENCOUNTER FOR SUBSEQUENT ANNUAL WELLNESS VISIT (AWV) IN MEDICARE PATIENT: Primary | ICD-10-CM

## 2024-01-04 DIAGNOSIS — Z12.11 SCREENING FOR MALIGNANT NEOPLASM OF COLON: ICD-10-CM

## 2024-01-04 DIAGNOSIS — E11.65 TYPE 2 DIABETES MELLITUS WITH HYPERGLYCEMIA, WITH LONG-TERM CURRENT USE OF INSULIN: ICD-10-CM

## 2024-01-04 DIAGNOSIS — E66.09 CLASS 1 OBESITY DUE TO EXCESS CALORIES WITH SERIOUS COMORBIDITY AND BODY MASS INDEX (BMI) OF 33.0 TO 33.9 IN ADULT: ICD-10-CM

## 2024-01-04 DIAGNOSIS — E78.5 HYPERLIPIDEMIA, UNSPECIFIED HYPERLIPIDEMIA TYPE: ICD-10-CM

## 2024-01-04 DIAGNOSIS — I10 HYPERTENSION, UNSPECIFIED TYPE: ICD-10-CM

## 2024-01-04 LAB
EST. AVERAGE GLUCOSE BLD GHB EST-MCNC: 341 MG/DL
HBA1C MFR BLD HPLC: 13.5 % (ref 4.5–6.6)

## 2024-01-04 PROCEDURE — 3046F HEMOGLOBIN A1C LEVEL >9.0%: CPT | Mod: ,,, | Performed by: FAMILY MEDICINE

## 2024-01-04 PROCEDURE — 83036 HEMOGLOBIN GLYCOSYLATED A1C: CPT | Mod: ,,, | Performed by: CLINICAL MEDICAL LABORATORY

## 2024-01-04 PROCEDURE — 3078F DIAST BP <80 MM HG: CPT | Mod: ,,, | Performed by: FAMILY MEDICINE

## 2024-01-04 PROCEDURE — 3074F SYST BP LT 130 MM HG: CPT | Mod: ,,, | Performed by: FAMILY MEDICINE

## 2024-01-04 PROCEDURE — 1159F MED LIST DOCD IN RCRD: CPT | Mod: ,,, | Performed by: FAMILY MEDICINE

## 2024-01-04 PROCEDURE — G0439 PPPS, SUBSEQ VISIT: HCPCS | Mod: ,,, | Performed by: FAMILY MEDICINE

## 2024-01-04 PROCEDURE — 1160F RVW MEDS BY RX/DR IN RCRD: CPT | Mod: ,,, | Performed by: FAMILY MEDICINE

## 2024-01-04 RX ORDER — POLYETHYLENE GLYCOL 3350, SODIUM SULFATE ANHYDROUS, SODIUM BICARBONATE, SODIUM CHLORIDE, POTASSIUM CHLORIDE 236; 22.74; 6.74; 5.86; 2.97 G/4L; G/4L; G/4L; G/4L; G/4L
4 POWDER, FOR SOLUTION ORAL ONCE
Qty: 4000 ML | Refills: 0 | Status: SHIPPED | OUTPATIENT
Start: 2024-01-04 | End: 2024-01-04

## 2024-01-04 NOTE — PROGRESS NOTES
"  Tequila Vázquez presented for a  Medicare AWV and comprehensive Health Risk Assessment today. The following components were reviewed and updated:    Medical history  Family History  Social history  Allergies and Current Medications  Health Risk Assessment  Health Maintenance  Care Team         ** See Completed Assessments for Annual Wellness Visit within the encounter summary.**         The following assessments were completed:  Living Situation  CAGE  Depression Screening  Timed Get Up and Go  Whisper Test  Cognitive Function Screening  Nutrition Screening  ADL Screening  PAQ Screening        Vitals:    01/04/24 0915   BP: 116/77   BP Location: Right arm   Patient Position: Sitting   Pulse: 102   Resp: 12   SpO2: 99%   Weight: 90.5 kg (199 lb 9.6 oz)   Height: 5' 5" (1.651 m)     Body mass index is 33.22 kg/m².  Physical Exam  Vitals reviewed.   Constitutional:       Appearance: Normal appearance.   HENT:      Head: Normocephalic and atraumatic.   Eyes:      Extraocular Movements: Extraocular movements intact.      Conjunctiva/sclera: Conjunctivae normal.      Pupils: Pupils are equal, round, and reactive to light.   Cardiovascular:      Rate and Rhythm: Normal rate and regular rhythm.      Heart sounds: Normal heart sounds.   Pulmonary:      Effort: Pulmonary effort is normal.      Breath sounds: Normal breath sounds.   Musculoskeletal:         General: Normal range of motion.      Cervical back: Normal range of motion.   Skin:     General: Skin is warm and dry.   Neurological:      General: No focal deficit present.      Mental Status: She is alert and oriented to person, place, and time.   Psychiatric:         Mood and Affect: Mood normal.         Behavior: Behavior normal.             Diagnoses and health risks identified today and associated recommendations/orders:    1. Encounter for subsequent annual wellness visit (AWV) in Medicare patient  2025 AWV scheduled    2. Hypertension, unspecified type  Well " controlled, continue current care    3. Hyperlipidemia, unspecified hyperlipidemia type  Low fat diet and continue statin    4. Type 2 diabetes mellitus with hyperglycemia, with long-term current use of insulin  Diabetic diet and continue current meds  - Hemoglobin A1C; Future  - Hemoglobin A1C    5. Screening for malignant neoplasm of colon    - Colonoscopy; Future    6. Grieving  Family support    7. Class 1 obesity due to excess calories with serious comorbidity and body mass index (BMI) of 33.0 to 33.9 in adult  eat a well balanced, healthy diet low in sodium, saturated/trans fats, refined carbs and processed foods.  Get regular exercise at least 30 minutes 4-5 x week.  Maintain a healthy weight.      Stay up to date with immunizations and preventive screenings.  Take all medications as prescribed and attend regular follow up visits.  Get annual wellness exam.          Provided Tequila with a 5-10 year written screening schedule and personal prevention plan. Recommendations were developed using the USPSTF age appropriate recommendations. Education, counseling, and referrals were provided as needed. After Visit Summary printed and given to patient which includes a list of additional screenings\tests needed.    Follow up for 1 year for Annual Wellness Visit.    Angel Phillips MD      I offered to discuss advanced care planning, including how to pick a person who would make decisions for you if you were unable to make them for yourself, called a health care power of , and what kind of decisions you might make such as use of life sustaining treatments such as ventilators and tube feeding when faced with a life limiting illness recorded on a living will that they will need to know. (How you want to be cared for as you near the end of your natural life)     X Patient is interested in learning more about how to make advanced directives.  I provided them paperwork and offered to discuss this with them.

## 2024-01-04 NOTE — PATIENT INSTRUCTIONS
Counseling and Referral of Other Preventative  (Italic type indicates deductible and co-insurance are waived)    Patient Name: Tequila Vázquez  Today's Date: 1/4/2024    Health Maintenance         Date Due Completion Date    TETANUS VACCINE Never done ---    Shingles Vaccine (1 of 2) Never done ---    Colorectal Cancer Screening Never done ---    Eye Exam 12/01/2021 12/1/2020    Foot Exam 04/13/2023 4/13/2022 (Done)    Override on 4/13/2022: Done    COVID-19 Vaccine (5 - 2023-24 season) 09/01/2023 11/1/2022    Hemoglobin A1c 01/11/2024 10/11/2023    Override on 4/14/2021: Done    Diabetes Urine Screening 06/12/2024 6/12/2023    Lipid Panel 06/12/2024 6/12/2023    Mammogram 10/18/2024 10/18/2023          Orders Placed This Encounter   Procedures    Hemoglobin A1C     The following information is provided to all patients.  This information is to help you find resources for any of the problems found today that may be affecting your health:                  Living healthy guide: [place].gov    Understanding Diabetes: www.diabetes.org      Eating healthy: www.cdc.gov/healthyweight      CDC home safety checklist: www.cdc.gov/steadi/patient.html      Agency on Aging: [place].gov    Alcoholics anonymous (AA): www.aa.org      Physical Activity: www.mayra.nih.gov/wv0huka      Tobacco use: [place].gov

## 2024-01-23 NOTE — PROGRESS NOTES
"Clinic note     Patient name: Tequila Vázquez is a 48 y.o. female   Chief compliant   Chief Complaint   Patient presents with    Diabetes    Medication Refill       Subjective     History of present illness   In clinic for routine follow up and medication refills   Denies any acute concerns    Past Medical History: SLE, asthma, DM type 2, latent syphilis, myasthenia gravis, hyperlipidemia     Last A1c 13.5- this is due to repeat after 4/3/24   Checking blood glucose BID and prn  Blood glucose log : not in clinic, she reports fasting 180-220  Glucometer: does not have in clinic     She admits that she has struggled with her health after the sudden death of her son a few months ago, she states she is feeling better, she has started exercising again and is following diabetic diet recommendations, she states she is "ready to get back on track"         Hx of right eyelid ptosis and weakness of RUE and RLE, myasthenia gravis, recently seen by [facility] neurology,  Dr Desir and ANDREA Rogers, NP-C at  neurology [facility]  She continues taking IVIG infusions every Friday                   Social History     Tobacco Use    Smoking status: Never     Passive exposure: Never    Smokeless tobacco: Current     Types: Snuff   Substance Use Topics    Alcohol use: Never    Drug use: Never       Review of patient's allergies indicates:   Allergen Reactions    Aspirin Hives       Past Medical History:   Diagnosis Date    Asthma     Diabetes mellitus     Late syphilis, 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 Added automatically from request for surgery [id number]       Past Surgical History:   Procedure Laterality Date    DILATION AND CURETTAGE OF UTERUS  2000    preformed at [facility]    HYSTERECTOMY      MUSCLE BIOPSY      OOPHORECTOMY      pt has had one ovary removed, unsure side    TUBAL LIGATION  1999     [facility]        Family History " "  Problem Relation Age of Onset    Asthma Mother     Coronary artery disease Mother     Diabetes Father     Heart disease Father     Cancer Sister         Breast    Breast cancer Sister          Current Outpatient Medications:     albuterol (PROVENTIL/VENTOLIN HFA) 90 mcg/actuation inhaler, Inhale 2 puffs into the lungs every 6 (six) hours as needed for Wheezing. Rescue, Disp: 18 g, Rfl: 2    EASY TOUCH 31 gauge x 3/16" Ndle, USE one needle TO INJECT insulin ONCE DAILY as prescribed, Disp: , Rfl:     fluticasone propionate (FLONASE) 50 mcg/actuation nasal spray, 1 spray (50 mcg total) by Each Nostril route once daily., Disp: 16 g, Rfl: 2    HYDROcodone-acetaminophen (NORCO) 5-325 mg per tablet, Take 1 tablet by mouth every 6 (six) hours as needed for Pain. (Patient not taking: Reported on 1/4/2024), Disp: 12 tablet, Rfl: 0    insulin degludec (TRESIBA FLEXTOUCH U-100) 100 unit/mL (3 mL) insulin pen, Inject 8 Units into the skin once daily., Disp: 2.4 mL, Rfl: 2    lancets (E-Z JECT LANCETS) 32 gauge Misc, 100 lancets by Misc.(Non-Drug; Combo Route) route. USE TO CHECK GLUCOSE ONCE DAILY, Disp: , Rfl:     lovastatin (MEVACOR) 20 MG tablet, Take 1 tablet (20 mg total) by mouth once daily., Disp: 90 tablet, Rfl: 0    metFORMIN (GLUCOPHAGE) 500 MG tablet, Take 1 tablet (500 mg total) by mouth 2 (two) times daily with meals., Disp: 180 tablet, Rfl: 0    PANZYGA 10 % Soln, Inject into the vein., Disp: , Rfl:     pen needle, diabetic 32 gauge x 1/4" Ndle, Use one needle to inject insulin daily as prescribed, Disp: 100 each, Rfl: 5    predniSONE (DELTASONE) 10 MG tablet, Take 1.5 tablets by mouth Daily., Disp: , Rfl:     semaglutide (OZEMPIC) 1 mg/dose (4 mg/3 mL), Inject 1 mg into the skin every 7 days., Disp: 3 mL, Rfl: 2    traZODone (DESYREL) 50 MG tablet, Take 1/2 to one tablet PO HS prn sleeep, Disp: 30 tablet, Rfl: 2    TRUEPLUS PEN NEEDLE 32 gauge x 5/32" Ndle, SMARTSIG:injection Daily, Disp: , Rfl:     Review of " "Systems   Constitutional:  Negative for appetite change, chills, fatigue, fever and unexpected weight change.   Respiratory:  Negative for cough and shortness of breath.    Cardiovascular:  Negative for chest pain, palpitations and leg swelling.   Gastrointestinal:  Negative for abdominal pain, change in bowel habit, constipation, diarrhea, nausea and vomiting.   Genitourinary:  Negative for dysuria and frequency.   Musculoskeletal:  Negative for arthralgias, gait problem and myalgias.   Neurological:  Negative for dizziness, syncope, light-headedness and headaches.   Psychiatric/Behavioral:  Negative for dysphoric mood and sleep disturbance. The patient is not nervous/anxious.        Objective     /81   Pulse 87   Resp 13   Ht 5' 5" (1.651 m)   Wt 89 kg (196 lb 4.8 oz)   SpO2 97%   BMI 32.67 kg/m²     Physical Exam   Constitutional: She is oriented to person, place, and time. She is cooperative. No distress.   HENT:   Head: Atraumatic.   Mouth/Throat: Mucous membranes are moist.   Eyes:   Right eye ptosis    Cardiovascular: Normal rate and regular rhythm. Pulmonary:      Effort: Pulmonary effort is normal. No respiratory distress.      Breath sounds: Normal breath sounds. No wheezing, rhonchi or rales.     Abdominal: Soft. Bowel sounds are normal. She exhibits no distension. There is no abdominal tenderness.   Musculoskeletal:         General: Normal range of motion.      Cervical back: Neck supple.      Right lower leg: No edema.      Left lower leg: No edema.   Neurological: She is alert and oriented to person, place, and time. Gait normal.   Skin: Skin is warm and dry.   Psychiatric: Her speech is normal and behavior is normal. Mood, affect and thought content normal. Thought content is not paranoid. She expresses no homicidal and no suicidal ideation. She expresses no suicidal plans and no homicidal plans.       Lab Results   Component Value Date    WBC 5.25 10/11/2023    HGB 13.1 10/11/2023    HCT " 38.1 10/11/2023    MCV 92.9 10/11/2023     10/11/2023       CMP  Sodium   Date Value Ref Range Status   10/11/2023 132 (L) 136 - 145 mmol/L Final     Potassium   Date Value Ref Range Status   10/11/2023 4.9 3.5 - 5.1 mmol/L Final     Chloride   Date Value Ref Range Status   10/11/2023 100 98 - 107 mmol/L Final     CO2   Date Value Ref Range Status   10/11/2023 27 21 - 32 mmol/L Final     Glucose   Date Value Ref Range Status   10/11/2023 415 (H) 74 - 106 mg/dL Final     BUN   Date Value Ref Range Status   10/11/2023 11 7 - 18 mg/dL Final     Creatinine   Date Value Ref Range Status   10/11/2023 1.19 (H) 0.55 - 1.02 mg/dL Final     Calcium   Date Value Ref Range Status   10/11/2023 9.7 8.5 - 10.1 mg/dL Final     Total Protein   Date Value Ref Range Status   10/11/2023 8.7 (H) 6.4 - 8.2 g/dL Final     Albumin   Date Value Ref Range Status   10/11/2023 3.4 (L) 3.5 - 5.0 g/dL Final     Bilirubin, Total   Date Value Ref Range Status   10/11/2023 0.2 >0.0 - 1.2 mg/dL Final     Alk Phos   Date Value Ref Range Status   10/11/2023 88 41 - 108 U/L Final     AST   Date Value Ref Range Status   10/11/2023 19 15 - 37 U/L Final     ALT   Date Value Ref Range Status   10/11/2023 30 13 - 56 U/L Final     Anion Gap   Date Value Ref Range Status   10/11/2023 10 7 - 16 mmol/L Final     eGFR    Date Value Ref Range Status   08/13/2021 67 >=60 mL/min/1.73m² Final     eGFR   Date Value Ref Range Status   04/13/2022 61 >=60 mL/min/1.73m² Final     Lab Results   Component Value Date    TSH 0.766 08/13/2021     Lab Results   Component Value Date    CHOL 232 (H) 06/12/2023    CHOL 216 (H) 04/13/2022    CHOL 250 (H) 04/19/2021     Lab Results   Component Value Date    HDL 67 (H) 06/12/2023    HDL 58 04/13/2022    HDL 54 04/19/2021     Lab Results   Component Value Date    LDLCALC 143 06/12/2023    LDLCALC 137 04/13/2022    LDLCALC 166 04/19/2021     Lab Results   Component Value Date    TRIG 112 06/12/2023    TRIG 106  04/13/2022    TRIG 150 04/19/2021     Lab Results   Component Value Date    CHOLHDL 3.5 06/12/2023    CHOLHDL 3.7 04/13/2022    CHOLHDL 4.6 04/19/2021     Lab Results   Component Value Date    HGBA1C 13.5 (H) 01/04/2024         Assessment and Plan   Type 2 diabetes mellitus without complication, with long-term current use of insulin  -     semaglutide (OZEMPIC) 1 mg/dose (4 mg/3 mL); Inject 1 mg into the skin every 7 days.  Dispense: 3 mL; Refill: 2  -     metFORMIN (GLUCOPHAGE) 500 MG tablet; Take 1 tablet (500 mg total) by mouth 2 (two) times daily with meals.  Dispense: 180 tablet; Refill: 0  -     insulin degludec (TRESIBA FLEXTOUCH U-100) 100 unit/mL (3 mL) insulin pen; Inject 8 Units into the skin once daily.  Dispense: 2.4 mL; Refill: 2    Hypertension, unspecified type  -     lovastatin (MEVACOR) 20 MG tablet; Take 1 tablet (20 mg total) by mouth once daily.  Dispense: 90 tablet; Refill: 0    Insomnia, unspecified type  -     traZODone (DESYREL) 50 MG tablet; Take 1/2 to one tablet PO HS prn sleeep  Dispense: 30 tablet; Refill: 2    Seasonal allergies  -     fluticasone propionate (FLONASE) 50 mcg/actuation nasal spray; 1 spray (50 mcg total) by Each Nostril route once daily.  Dispense: 16 g; Refill: 2    Systemic lupus erythematosus, unspecified SLE type, unspecified organ involvement status    Hyperlipidemia, unspecified hyperlipidemia type    Asthma, unspecified asthma severity, unspecified whether complicated, unspecified whether persistent  -     albuterol (PROVENTIL/VENTOLIN HFA) 90 mcg/actuation inhaler; Inhale 2 puffs into the lungs every 6 (six) hours as needed for Wheezing. Rescue  Dispense: 18 g; Refill: 2    Class 1 obesity due to excess calories with serious comorbidity and body mass index (BMI) of 32.0 to 32.9 in adult    Smokeless tobacco use      Assistance with cessation of smokeless tobacco use was offered, including:  []  Medications  [x]  Counseling  [x]  Printed Information on cessation  of smokeless tobacco use       Patient was counseled regarding use of smokeless tobacco for 3-10 minutes.      Patient Instructions  Patient Instructions   Refills on routine medications       Pt is advised to monitor and document glucose fasting when you wake up before you eat and 2 hours after meal and bring bg log in to next ov   Ensure to take medications as directed.    Follow diabetic diet as directed.    Work to achieve normal body weight.   Ensure to exercise 4-5 times per week for 20 minutes.     Follow up in clinic in two weeks with blood glucose log for medication management     A1c will be due to repeat after April 3     I have reviewed the encounter documentation and agree with the assessment and plan as put forth by the nurse practitioner, Elizabeth ROBLEDO.

## 2024-01-24 ENCOUNTER — OFFICE VISIT (OUTPATIENT)
Dept: FAMILY MEDICINE | Facility: CLINIC | Age: 49
End: 2024-01-24
Payer: COMMERCIAL

## 2024-01-24 VITALS
BODY MASS INDEX: 32.71 KG/M2 | DIASTOLIC BLOOD PRESSURE: 81 MMHG | WEIGHT: 196.31 LBS | HEART RATE: 87 BPM | HEIGHT: 65 IN | SYSTOLIC BLOOD PRESSURE: 125 MMHG | OXYGEN SATURATION: 97 % | RESPIRATION RATE: 13 BRPM

## 2024-01-24 DIAGNOSIS — G47.00 INSOMNIA, UNSPECIFIED TYPE: Chronic | ICD-10-CM

## 2024-01-24 DIAGNOSIS — E11.9 TYPE 2 DIABETES MELLITUS WITHOUT COMPLICATION, WITH LONG-TERM CURRENT USE OF INSULIN: Primary | Chronic | ICD-10-CM

## 2024-01-24 DIAGNOSIS — Z79.4 TYPE 2 DIABETES MELLITUS WITHOUT COMPLICATION, WITH LONG-TERM CURRENT USE OF INSULIN: Primary | Chronic | ICD-10-CM

## 2024-01-24 DIAGNOSIS — M32.9 SYSTEMIC LUPUS ERYTHEMATOSUS, UNSPECIFIED SLE TYPE, UNSPECIFIED ORGAN INVOLVEMENT STATUS: Chronic | ICD-10-CM

## 2024-01-24 DIAGNOSIS — E78.5 HYPERLIPIDEMIA, UNSPECIFIED HYPERLIPIDEMIA TYPE: Chronic | ICD-10-CM

## 2024-01-24 DIAGNOSIS — E66.09 CLASS 1 OBESITY DUE TO EXCESS CALORIES WITH SERIOUS COMORBIDITY AND BODY MASS INDEX (BMI) OF 32.0 TO 32.9 IN ADULT: ICD-10-CM

## 2024-01-24 DIAGNOSIS — J30.2 SEASONAL ALLERGIES: Chronic | ICD-10-CM

## 2024-01-24 DIAGNOSIS — Z72.0 SMOKELESS TOBACCO USE: ICD-10-CM

## 2024-01-24 DIAGNOSIS — I10 HYPERTENSION, UNSPECIFIED TYPE: Chronic | ICD-10-CM

## 2024-01-24 DIAGNOSIS — J45.909 ASTHMA, UNSPECIFIED ASTHMA SEVERITY, UNSPECIFIED WHETHER COMPLICATED, UNSPECIFIED WHETHER PERSISTENT: Chronic | ICD-10-CM

## 2024-01-24 PROCEDURE — 1160F RVW MEDS BY RX/DR IN RCRD: CPT | Mod: ,,, | Performed by: NURSE PRACTITIONER

## 2024-01-24 PROCEDURE — 99214 OFFICE O/P EST MOD 30 MIN: CPT | Mod: 25,,, | Performed by: NURSE PRACTITIONER

## 2024-01-24 PROCEDURE — 3079F DIAST BP 80-89 MM HG: CPT | Mod: ,,, | Performed by: NURSE PRACTITIONER

## 2024-01-24 PROCEDURE — 1159F MED LIST DOCD IN RCRD: CPT | Mod: ,,, | Performed by: NURSE PRACTITIONER

## 2024-01-24 PROCEDURE — 99406 BEHAV CHNG SMOKING 3-10 MIN: CPT | Mod: ,,, | Performed by: NURSE PRACTITIONER

## 2024-01-24 PROCEDURE — 3046F HEMOGLOBIN A1C LEVEL >9.0%: CPT | Mod: ,,, | Performed by: NURSE PRACTITIONER

## 2024-01-24 PROCEDURE — 3008F BODY MASS INDEX DOCD: CPT | Mod: ,,, | Performed by: NURSE PRACTITIONER

## 2024-01-24 PROCEDURE — 3074F SYST BP LT 130 MM HG: CPT | Mod: ,,, | Performed by: NURSE PRACTITIONER

## 2024-01-24 RX ORDER — METFORMIN HYDROCHLORIDE 500 MG/1
500 TABLET ORAL 2 TIMES DAILY WITH MEALS
Qty: 180 TABLET | Refills: 0 | Status: SHIPPED | OUTPATIENT
Start: 2024-01-24 | End: 2024-03-11 | Stop reason: SINTOL

## 2024-01-24 RX ORDER — SEMAGLUTIDE 1.34 MG/ML
1 INJECTION, SOLUTION SUBCUTANEOUS
Qty: 3 ML | Refills: 2 | Status: SHIPPED | OUTPATIENT
Start: 2024-01-24 | End: 2024-03-11

## 2024-01-24 RX ORDER — INSULIN DEGLUDEC 100 U/ML
8 INJECTION, SOLUTION SUBCUTANEOUS DAILY
Qty: 2.4 ML | Refills: 2 | Status: SHIPPED | OUTPATIENT
Start: 2024-01-24 | End: 2024-03-11 | Stop reason: ALTCHOICE

## 2024-01-24 RX ORDER — ALBUTEROL SULFATE 90 UG/1
2 AEROSOL, METERED RESPIRATORY (INHALATION) EVERY 6 HOURS PRN
Qty: 18 G | Refills: 2 | Status: SHIPPED | OUTPATIENT
Start: 2024-01-24

## 2024-01-24 RX ORDER — FLUTICASONE PROPIONATE 50 MCG
1 SPRAY, SUSPENSION (ML) NASAL DAILY
Qty: 16 G | Refills: 2 | Status: SHIPPED | OUTPATIENT
Start: 2024-01-24

## 2024-01-24 RX ORDER — LOVASTATIN 20 MG/1
20 TABLET ORAL DAILY
Qty: 90 TABLET | Refills: 0 | Status: SHIPPED | OUTPATIENT
Start: 2024-01-24 | End: 2024-06-18 | Stop reason: SDUPTHER

## 2024-01-24 RX ORDER — TRAZODONE HYDROCHLORIDE 50 MG/1
TABLET ORAL
Qty: 30 TABLET | Refills: 2 | Status: SHIPPED | OUTPATIENT
Start: 2024-01-24

## 2024-01-24 NOTE — PATIENT INSTRUCTIONS
Refills on routine medications       Pt is advised to monitor and document glucose fasting when you wake up before you eat and 2 hours after meal and bring bg log in to next ov   Ensure to take medications as directed.    Follow diabetic diet as directed.    Work to achieve normal body weight.   Ensure to exercise 4-5 times per week for 20 minutes.     Follow up in clinic in two weeks with blood glucose log for medication management     A1c will be due to repeat after April 3

## 2024-02-16 ENCOUNTER — HOSPITAL ENCOUNTER (EMERGENCY)
Facility: HOSPITAL | Age: 49
Discharge: HOME | End: 2024-02-16
Payer: COMMERCIAL

## 2024-02-16 VITALS
WEIGHT: 191 LBS | TEMPERATURE: 98 F | HEART RATE: 73 BPM | BODY MASS INDEX: 31.82 KG/M2 | DIASTOLIC BLOOD PRESSURE: 77 MMHG | OXYGEN SATURATION: 99 % | HEIGHT: 65 IN | SYSTOLIC BLOOD PRESSURE: 105 MMHG | RESPIRATION RATE: 16 BRPM

## 2024-02-16 DIAGNOSIS — R19.7 DIARRHEA, UNSPECIFIED TYPE: Primary | ICD-10-CM

## 2024-02-16 DIAGNOSIS — E86.0 MILD DEHYDRATION: ICD-10-CM

## 2024-02-16 LAB
ALBUMIN SERPL BCP-MCNC: 3.2 G/DL (ref 3.5–5)
ALBUMIN/GLOB SERPL: 0.7 {RATIO}
ALP SERPL-CCNC: 79 U/L (ref 41–108)
ALT SERPL W P-5'-P-CCNC: 18 U/L (ref 13–56)
ANION GAP SERPL CALCULATED.3IONS-SCNC: 13 MMOL/L (ref 7–16)
AST SERPL W P-5'-P-CCNC: 10 U/L (ref 15–37)
BASOPHILS # BLD AUTO: 0.03 K/UL (ref 0–0.2)
BASOPHILS NFR BLD AUTO: 0.6 % (ref 0–1)
BILIRUB SERPL-MCNC: 0.4 MG/DL (ref ?–1.2)
BILIRUB UR QL STRIP: NEGATIVE
BUN SERPL-MCNC: 14 MG/DL (ref 7–18)
BUN/CREAT SERPL: 15 (ref 6–20)
CALCIUM SERPL-MCNC: 8.7 MG/DL (ref 8.5–10.1)
CHLORIDE SERPL-SCNC: 103 MMOL/L (ref 98–107)
CLARITY UR: CLEAR
CO2 SERPL-SCNC: 24 MMOL/L (ref 21–32)
COLOR UR: YELLOW
CREAT SERPL-MCNC: 0.93 MG/DL (ref 0.55–1.02)
DIFFERENTIAL METHOD BLD: ABNORMAL
EGFR (NO RACE VARIABLE) (RUSH/TITUS): 75 ML/MIN/1.73M2
EOSINOPHIL # BLD AUTO: 0.11 K/UL (ref 0–0.5)
EOSINOPHIL NFR BLD AUTO: 2.1 % (ref 1–4)
ERYTHROCYTE [DISTWIDTH] IN BLOOD BY AUTOMATED COUNT: 12.3 % (ref 11.5–14.5)
GLOBULIN SER-MCNC: 4.5 G/DL (ref 2–4)
GLUCOSE SERPL-MCNC: 197 MG/DL (ref 74–106)
GLUCOSE UR STRIP-MCNC: 500 MG/DL
HCT VFR BLD AUTO: 40.9 % (ref 38–47)
HGB BLD-MCNC: 13.8 G/DL (ref 12–16)
KETONES UR STRIP-SCNC: 15 MG/DL
LEUKOCYTE ESTERASE UR QL STRIP: NEGATIVE
LYMPHOCYTES # BLD AUTO: 1.44 K/UL (ref 1–4.8)
LYMPHOCYTES NFR BLD AUTO: 27.9 % (ref 27–41)
MCH RBC QN AUTO: 31.7 PG (ref 27–31)
MCHC RBC AUTO-ENTMCNC: 33.7 G/DL (ref 32–36)
MCV RBC AUTO: 93.8 FL (ref 80–96)
MONOCYTES # BLD AUTO: 0.51 K/UL (ref 0–0.8)
MONOCYTES NFR BLD AUTO: 9.9 % (ref 2–6)
MPC BLD CALC-MCNC: 10.7 FL (ref 9.4–12.4)
NEUTROPHILS # BLD AUTO: 3.08 K/UL (ref 1.8–7.7)
NEUTROPHILS NFR BLD AUTO: 59.5 % (ref 53–65)
NITRITE UR QL STRIP: NEGATIVE
PH UR STRIP: 5 PH UNITS
PLATELET # BLD AUTO: 250 K/UL (ref 150–400)
POTASSIUM SERPL-SCNC: 3.9 MMOL/L (ref 3.5–5.1)
PROT SERPL-MCNC: 7.7 G/DL (ref 6.4–8.2)
PROT UR QL STRIP: NEGATIVE
RBC # BLD AUTO: 4.36 M/UL (ref 4.2–5.4)
RBC # UR STRIP: NEGATIVE /UL
SODIUM SERPL-SCNC: 136 MMOL/L (ref 136–145)
SP GR UR STRIP: 1.02
UROBILINOGEN UR STRIP-ACNC: 0.2 MG/DL
WBC # BLD AUTO: 5.17 K/UL (ref 4.5–11)

## 2024-02-16 PROCEDURE — 99284 EMERGENCY DEPT VISIT MOD MDM: CPT | Mod: GF | Performed by: NURSE PRACTITIONER

## 2024-02-16 PROCEDURE — 25000003 PHARM REV CODE 250: Performed by: NURSE PRACTITIONER

## 2024-02-16 PROCEDURE — 80053 COMPREHEN METABOLIC PANEL: CPT | Performed by: NURSE PRACTITIONER

## 2024-02-16 PROCEDURE — 85025 COMPLETE CBC W/AUTO DIFF WBC: CPT | Performed by: NURSE PRACTITIONER

## 2024-02-16 PROCEDURE — 99283 EMERGENCY DEPT VISIT LOW MDM: CPT

## 2024-02-16 PROCEDURE — 81003 URINALYSIS AUTO W/O SCOPE: CPT | Performed by: NURSE PRACTITIONER

## 2024-02-16 RX ADMIN — SODIUM CHLORIDE 1000 ML: 9 INJECTION, SOLUTION INTRAVENOUS at 09:02

## 2024-02-16 NOTE — ED PROVIDER NOTES
Encounter Date: 2/16/2024       History     Chief Complaint   Patient presents with    Abdominal Pain    Diarrhea     Pt is a 49 year old AAF with PMHx of SLE, asthma, DM type 2, latent syphilis, myasthenia gravis, hyperlipidemia who presents to the ER for diarrhea, generalized weakness and belching for 3 days. Denies n/v, abd pain and fever.      The history is provided by the patient.   Diarrhea   This is a new problem. The current episode started several days ago. The problem occurs 5 to 10 times per day. The problem has been unchanged. The stool consistency is described as Watery. Associated symptoms include bloating and myalgias. Pertinent negatives include no abdominal pain, arthralgias, chills, coughing, fever, headaches, increased  flatus, sweats, URI, vomiting or weight loss. Nothing aggravates the symptoms. There are no known risk factors. She has tried nothing for the symptoms. There is no history of bowel resection, inflammatory bowel disease, irritable bowel syndrome, malabsorption, a recent abdominal surgery or short gut syndrome.     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 Added automatically from request for surgery [id number]    Systemic lupus erythematosus, organ or system involvement unspecified      Past Surgical History:   Procedure Laterality Date    DILATION AND CURETTAGE OF UTERUS  2000    preformed at [facility]    HYSTERECTOMY      MUSCLE BIOPSY      OOPHORECTOMY      pt has had one ovary removed, unsure side    TUBAL LIGATION  1999    [facility]     Family History   Problem Relation Age of Onset    Asthma Mother     Coronary artery disease Mother     Diabetes Father     Heart disease Father     Cancer Sister          Breast    Breast cancer Sister      Social History     Tobacco Use    Smoking status: Never     Passive exposure: Never    Smokeless tobacco: Current     Types: Snuff   Substance Use Topics    Alcohol use: Never    Drug use: Never     Review of Systems   Constitutional:  Negative for chills, diaphoresis, fatigue, fever and weight loss.   HENT:  Negative for sore throat.    Respiratory:  Negative for cough and shortness of breath.    Cardiovascular:  Negative for chest pain.   Gastrointestinal:  Positive for bloating and diarrhea. Negative for abdominal pain, flatus, nausea and vomiting.   Genitourinary:  Negative for dysuria.   Musculoskeletal:  Positive for myalgias. Negative for arthralgias and back pain.   Skin:  Negative for rash.   Neurological:  Negative for weakness and headaches.   Hematological:  Does not bruise/bleed easily.       Physical Exam     Initial Vitals [02/16/24 0820]   BP Pulse Resp Temp SpO2   (!) 129/91 98 18 97.7 °F (36.5 °C) 100 %      MAP       --         Physical Exam    Nursing note and vitals reviewed.  Constitutional: She appears well-developed and well-nourished. She is not diaphoretic. She is cooperative.  Non-toxic appearance. She has a sickly appearance. She appears ill. No distress.   HENT:   Head: Normocephalic and atraumatic.   Mouth/Throat: Mucous membranes are dry.   Eyes: Pupils are equal, round, and reactive to light.   Neck: Neck supple.   Cardiovascular:  Normal rate, regular rhythm, normal heart sounds and intact distal pulses.     Exam reveals no gallop and no friction rub.       No murmur heard.  Pulmonary/Chest: Breath sounds normal. No respiratory distress. She has no wheezes. She has no rhonchi. She has no rales. She exhibits no tenderness.   Abdominal: Abdomen is soft and flat. Bowel sounds are normal. There is abdominal tenderness in the suprapubic area and left lower quadrant. There is no rebound and no guarding.   Musculoskeletal:      Cervical back: Neck supple.      Neurological: She is alert and oriented to person, place, and time.   Skin: Skin is warm and dry. Capillary refill takes less than 2 seconds.   Psychiatric: She has a normal mood and affect.         Medical Screening Exam   See Full Note    ED Course   Procedures  Labs Reviewed   COMPREHENSIVE METABOLIC PANEL - Abnormal; Notable for the following components:       Result Value    Glucose 197 (*)     Albumin 3.2 (*)     Globulin 4.5 (*)     AST 10 (*)     All other components within normal limits   URINALYSIS, REFLEX TO URINE CULTURE - Abnormal; Notable for the following components:    Glucose,  (*)     Ketones, UA 15 (*)     All other components within normal limits   CBC WITH DIFFERENTIAL - Abnormal; Notable for the following components:    MCH 31.7 (*)     Monocytes % 9.9 (*)     All other components within normal limits   CBC W/ AUTO DIFFERENTIAL    Narrative:     The following orders were created for panel order CBC auto differential.  Procedure                               Abnormality         Status                     ---------                               -----------         ------                     CBC with Differential[[id number]]       Abnormal            Final result                 Please view results for these tests on the individual orders.          Imaging Results    None          Medications   sodium chloride 0.9% bolus 1,000 mL 1,000 mL (0 mLs Intravenous Stopped 2/16/24 1003)     Medical Decision Making  Problems Addressed:  Diarrhea, unspecified type: acute illness or injury     Details: OTC probiotic, increased fluids for hydration, return precautions  Mild dehydration: self-limited or minor problem    Amount and/or Complexity of Data Reviewed  Labs: ordered. Decision-making details documented in ED Course.               ED Course as of 02/16/24 1049   Fri Feb 16, 2024   0917 Glucose(!): 197 [AG]   0917 Electrolytes stable.  Renal function stable.  WBC normal.  UA pending. No abd  pain, elevated WBC or fever.   [AG]   1046 UA no infection.  [AG]   1047 Pt reports she feels better and is ready to go home.  [AG]      ED Course User Index  [AG] Natasha Rizo FNP                           Clinical Impression:   Final diagnoses:  [R19.7] Diarrhea, unspecified type (Primary)  [E86.0] Mild dehydration        ED Disposition Condition    Discharge Stable          ED Prescriptions    None       Follow-up Information       Follow up With Specialties Details Why Contact Info    Elizabeth Schmidt FNP Family Medicine Schedule an appointment as soon as possible for a visit in 2 days for recheck and possible stool studies [address] [facility]  [facility], [place] [address]  [phone]               Natasha Rizo FNP  02/16/24 104

## 2024-02-16 NOTE — Clinical Note
"Tequila"Albina Vázquez was seen and treated in our emergency department on 2/16/2024.  She may return to work on 02/18/2024.       If you have any questions or concerns, please don't hesitate to call.      Natasha Rizo, KOFIP"

## 2024-02-16 NOTE — DISCHARGE INSTRUCTIONS
Call your family practitioner at the local clinic.  Let them know about your ER visit, they may want to see you and get stool studies.  Return to the ER for abdominal pain, vomiting or fever.     Get over the counter probiotic to take.

## 2024-03-11 ENCOUNTER — OFFICE VISIT (OUTPATIENT)
Dept: FAMILY MEDICINE | Facility: CLINIC | Age: 49
End: 2024-03-11
Payer: COMMERCIAL

## 2024-03-11 VITALS
HEIGHT: 65 IN | SYSTOLIC BLOOD PRESSURE: 123 MMHG | DIASTOLIC BLOOD PRESSURE: 85 MMHG | OXYGEN SATURATION: 99 % | BODY MASS INDEX: 33.55 KG/M2 | WEIGHT: 201.38 LBS | HEART RATE: 74 BPM

## 2024-03-11 DIAGNOSIS — E11.9 TYPE 2 DIABETES MELLITUS WITHOUT COMPLICATION, WITHOUT LONG-TERM CURRENT USE OF INSULIN: Primary | Chronic | ICD-10-CM

## 2024-03-11 DIAGNOSIS — M32.9 SYSTEMIC LUPUS ERYTHEMATOSUS, UNSPECIFIED SLE TYPE, UNSPECIFIED ORGAN INVOLVEMENT STATUS: Chronic | ICD-10-CM

## 2024-03-11 DIAGNOSIS — I10 HYPERTENSION, UNSPECIFIED TYPE: Chronic | ICD-10-CM

## 2024-03-11 PROBLEM — E78.5 HYPERLIPIDEMIA: Chronic | Status: ACTIVE | Noted: 2022-09-26

## 2024-03-11 PROBLEM — K21.9 GASTROESOPHAGEAL REFLUX DISEASE: Chronic | Status: ACTIVE | Noted: 2022-09-26

## 2024-03-11 PROBLEM — G43.009 MIGRAINE WITHOUT AURA AND WITHOUT STATUS MIGRAINOSUS, NOT INTRACTABLE: Chronic | Status: ACTIVE | Noted: 2021-06-21

## 2024-03-11 PROBLEM — A52.8 LATE SYPHILIS, LATENT: Chronic | Status: ACTIVE | Noted: 2021-06-21

## 2024-03-11 PROCEDURE — 3074F SYST BP LT 130 MM HG: CPT | Mod: ,,, | Performed by: NURSE PRACTITIONER

## 2024-03-11 PROCEDURE — 3079F DIAST BP 80-89 MM HG: CPT | Mod: ,,, | Performed by: NURSE PRACTITIONER

## 2024-03-11 PROCEDURE — 1159F MED LIST DOCD IN RCRD: CPT | Mod: ,,, | Performed by: NURSE PRACTITIONER

## 2024-03-11 PROCEDURE — 1160F RVW MEDS BY RX/DR IN RCRD: CPT | Mod: ,,, | Performed by: NURSE PRACTITIONER

## 2024-03-11 PROCEDURE — 3008F BODY MASS INDEX DOCD: CPT | Mod: ,,, | Performed by: NURSE PRACTITIONER

## 2024-03-11 PROCEDURE — 99214 OFFICE O/P EST MOD 30 MIN: CPT | Mod: ,,, | Performed by: NURSE PRACTITIONER

## 2024-03-11 PROCEDURE — 3046F HEMOGLOBIN A1C LEVEL >9.0%: CPT | Mod: ,,, | Performed by: NURSE PRACTITIONER

## 2024-03-11 RX ORDER — PREGABALIN 50 MG/1
50 CAPSULE ORAL 3 TIMES DAILY
COMMUNITY
Start: 2024-02-28 | End: 2024-03-25 | Stop reason: ALTCHOICE

## 2024-03-11 RX ORDER — INSULIN GLARGINE 100 [IU]/ML
8 INJECTION, SOLUTION SUBCUTANEOUS DAILY
Qty: 7.2 ML | Refills: 0 | Status: SHIPPED | OUTPATIENT
Start: 2024-03-11 | End: 2024-06-09

## 2024-03-11 RX ORDER — INSULIN GLARGINE 100 [IU]/ML
8 INJECTION, SOLUTION SUBCUTANEOUS DAILY
Qty: 7.2 ML | Refills: 0 | Status: SHIPPED | OUTPATIENT
Start: 2024-03-11 | End: 2024-03-11

## 2024-03-11 NOTE — PATIENT INSTRUCTIONS
Lab obtained in clinic today, we will notify you of results and any necessary changes to plan of care     Stop metformin     Start Lantus insulin, 8 units daily       Pt is advised to monitor and document glucose fasting when you wake up before you eat and 2 hours after meal and bring bg log in to next ov   Ensure to take medications as directed.    Follow diabetic diet as directed.    Work to achieve normal body weight.   Ensure to exercise 4-5 times per week for 20 minutes.     Follow up in two weeks with blood glucose log

## 2024-03-11 NOTE — PROGRESS NOTES
Clinic note     Patient name: Tequila Vázquez is a 49 y.o. female   Chief compliant   Chief Complaint   Patient presents with    Follow-up     States [facility] called and that she needs a A1C.  No problem voiced at this time. Has not got ozempic rx due to back order. Asked if she could talk about a different medication other than metformin.        Subjective     History of present illness   In clinic for routine follow up and medication refills   States she has been unable to get Ozempic due to medication being on back order   She reports metformin is causing significant diarrhea and would like to discuss other medication options   She reports she is not taking tresiba insulin, insurance will not cover  Will rx Lantus today, d/c Ozempic and metformin  She states [facility] is texting everyday stating it is time for A1c, she request this be obtained today, per records not due until 4/3/2024  She is walking two times everyday for exercise and states she is feeling much better     Past Medical History: SLE, asthma, DM type 2, latent syphilis, myasthenia gravis, hyperlipidemia     Last A1c 13.5- this is due to repeat after 4/3/24   Checking blood glucose BID and prn  Blood glucose log : not in clinic, she reports fasting readings in 160's   Glucometer: does not have in clinic      Hx of right eyelid ptosis and weakness of RUE and RLE, myasthenia gravis, recently seen by [facility] neurology,  Dr Desir and ANDREA Rogers NP-C at  neurology [facility]  She continues taking IVIG infusions every Friday   She has follow up with neurology on Thursday this week and is hoping to decrease prednisone dose              Social History     Tobacco Use    Smoking status: Never     Passive exposure: Never    Smokeless tobacco: Current     Types: Snuff   Substance Use Topics    Alcohol use: Never    Drug use: Never       Review of patient's allergies indicates:   Allergen Reactions    Aspirin Hives       Past Medical History:   Diagnosis Date     "Asthma     Diabetes mellitus     Late syphilis, 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 Added automatically from request for surgery [id number]    Systemic lupus erythematosus, organ or system involvement unspecified        Past Surgical History:   Procedure Laterality Date    DILATION AND CURETTAGE OF UTERUS  2000    preformed at [facility]    HYSTERECTOMY      MUSCLE BIOPSY      OOPHORECTOMY      pt has had one ovary removed, unsure side    TUBAL LIGATION  1999    [facility]        Family History   Problem Relation Age of Onset    Asthma Mother     Coronary artery disease Mother     Diabetes Father     Heart disease Father     Cancer Sister         Breast    Breast cancer Sister          Current Outpatient Medications:     albuterol (PROVENTIL/VENTOLIN HFA) 90 mcg/actuation inhaler, Inhale 2 puffs into the lungs every 6 (six) hours as needed for Wheezing. Rescue, Disp: 18 g, Rfl: 2    fluticasone propionate (FLONASE) 50 mcg/actuation nasal spray, 1 spray (50 mcg total) by Each Nostril route once daily., Disp: 16 g, Rfl: 2    lovastatin (MEVACOR) 20 MG tablet, Take 1 tablet (20 mg total) by mouth once daily., Disp: 90 tablet, Rfl: 0    predniSONE (DELTASONE) 10 MG tablet, Take 1.5 tablets by mouth Daily., Disp: , Rfl:     pregabalin (LYRICA) 50 MG capsule, Take 50 mg by mouth 3 (three) times daily., Disp: , Rfl:     traZODone (DESYREL) 50 MG tablet, Take 1/2 to one tablet PO HS prn sleeep, Disp: 30 tablet, Rfl: 2    EASY TOUCH 31 gauge x 3/16" Ndle, USE one needle TO INJECT insulin ONCE DAILY as prescribed, Disp: , Rfl:     insulin (LANTUS SOLOSTAR U-100 INSULIN) glargine 100 units/mL SubQ pen, Inject 8 Units into the skin once daily., Disp: 7.2 mL, Rfl: 0    lancets (E-Z JECT LANCETS) 32 gauge Misc, 100 lancets by Misc.(Non-Drug; Combo Route) route. USE TO CHECK GLUCOSE ONCE DAILY, Disp: , Rfl:     PANZYGA 10 % " "Soln, Inject into the vein., Disp: , Rfl:     pen needle, diabetic 32 gauge x 1/4" Ndle, Use one needle to inject insulin daily as prescribed, Disp: 100 each, Rfl: 5    TRUEPLUS PEN NEEDLE 32 gauge x 5/32" Ndle, SMARTSIG:injection Daily, Disp: , Rfl:     Review of Systems   Constitutional:  Negative for appetite change, chills, fatigue, fever and unexpected weight change.   Respiratory:  Negative for cough and shortness of breath.    Cardiovascular:  Negative for chest pain, palpitations and leg swelling.   Gastrointestinal:  Negative for abdominal pain, change in bowel habit, constipation, diarrhea, nausea and vomiting.   Genitourinary:  Negative for dysuria and frequency.   Musculoskeletal:  Negative for arthralgias, gait problem and myalgias.   Neurological:  Negative for dizziness, syncope, light-headedness and headaches.   Psychiatric/Behavioral:  Negative for dysphoric mood and sleep disturbance. The patient is not nervous/anxious.        Objective     /85   Pulse 74   Ht 5' 5" (1.651 m)   Wt 91.4 kg (201 lb 6.4 oz)   SpO2 99%   BMI 33.51 kg/m²     Physical Exam   Constitutional: She is oriented to person, place, and time. She is cooperative. No distress.   HENT:   Head: Atraumatic.   Mouth/Throat: Mucous membranes are moist.   Eyes:   Right eye ptosis     Cardiovascular: Normal rate and regular rhythm. Pulmonary:      Effort: Pulmonary effort is normal. No respiratory distress.      Breath sounds: Normal breath sounds. No wheezing, rhonchi or rales.     Abdominal: Soft. Bowel sounds are normal. She exhibits no distension. There is no abdominal tenderness.   Musculoskeletal:         General: Normal range of motion.      Cervical back: Neck supple.      Right lower leg: No edema.      Left lower leg: No edema.   Neurological: She is alert and oriented to person, place, and time. Gait normal.   Skin: Skin is warm and dry.   Psychiatric: Her speech is normal and behavior is normal. Mood, affect and " thought content normal. Thought content is not paranoid. She expresses no homicidal and no suicidal ideation. She expresses no suicidal plans and no homicidal plans.       Lab Results   Component Value Date    WBC 5.17 02/16/2024    HGB 13.8 02/16/2024    HCT 40.9 02/16/2024    MCV 93.8 02/16/2024     02/16/2024       CMP  Sodium   Date Value Ref Range Status   02/16/2024 136 136 - 145 mmol/L Final     Potassium   Date Value Ref Range Status   02/16/2024 3.9 3.5 - 5.1 mmol/L Final     Chloride   Date Value Ref Range Status   02/16/2024 103 98 - 107 mmol/L Final     CO2   Date Value Ref Range Status   02/16/2024 24 21 - 32 mmol/L Final     Glucose   Date Value Ref Range Status   02/16/2024 197 (H) 74 - 106 mg/dL Final     BUN   Date Value Ref Range Status   02/16/2024 14 7 - 18 mg/dL Final     Creatinine   Date Value Ref Range Status   02/16/2024 0.93 0.55 - 1.02 mg/dL Final     Calcium   Date Value Ref Range Status   02/16/2024 8.7 8.5 - 10.1 mg/dL Final     Total Protein   Date Value Ref Range Status   02/16/2024 7.7 6.4 - 8.2 g/dL Final     Albumin   Date Value Ref Range Status   02/16/2024 3.2 (L) 3.5 - 5.0 g/dL Final     Bilirubin, Total   Date Value Ref Range Status   02/16/2024 0.4 >0.0 - 1.2 mg/dL Final     Alk Phos   Date Value Ref Range Status   02/16/2024 79 41 - 108 U/L Final     AST   Date Value Ref Range Status   02/16/2024 10 (L) 15 - 37 U/L Final     ALT   Date Value Ref Range Status   02/16/2024 18 13 - 56 U/L Final     Anion Gap   Date Value Ref Range Status   02/16/2024 13 7 - 16 mmol/L Final     eGFR    Date Value Ref Range Status   08/13/2021 67 >=60 mL/min/1.73m² Final     eGFR   Date Value Ref Range Status   04/13/2022 61 >=60 mL/min/1.73m² Final     Lab Results   Component Value Date    TSH 0.766 08/13/2021     Lab Results   Component Value Date    CHOL 232 (H) 06/12/2023    CHOL 216 (H) 04/13/2022    CHOL 250 (H) 04/19/2021     Lab Results   Component Value Date    HDL  67 (H) 06/12/2023    HDL 58 04/13/2022    HDL 54 04/19/2021     Lab Results   Component Value Date    LDLCALC 143 06/12/2023    LDLCALC 137 04/13/2022    LDLCALC 166 04/19/2021     Lab Results   Component Value Date    TRIG 112 06/12/2023    TRIG 106 04/13/2022    TRIG 150 04/19/2021     Lab Results   Component Value Date    CHOLHDL 3.5 06/12/2023    CHOLHDL 3.7 04/13/2022    CHOLHDL 4.6 04/19/2021     Lab Results   Component Value Date    HGBA1C 13.5 (H) 01/04/2024         Assessment and Plan   Type 2 diabetes mellitus without complication, without long-term current use of insulin  -     insulin (LANTUS SOLOSTAR U-100 INSULIN) glargine 100 units/mL SubQ pen; Inject 8 Units into the skin once daily.  Dispense: 7.2 mL; Refill: 0  -     Hemoglobin A1C; Future; Expected date: 03/11/2024    Hypertension, unspecified type    Systemic lupus erythematosus, unspecified SLE type, unspecified organ involvement status          Patient Instructions  Patient Instructions   Lab obtained in clinic today, we will notify you of results and any necessary changes to plan of care     Stop metformin     Start Lantus insulin, 8 units daily       Pt is advised to monitor and document glucose fasting when you wake up before you eat and 2 hours after meal and bring bg log in to next ov   Ensure to take medications as directed.    Follow diabetic diet as directed.    Work to achieve normal body weight.   Ensure to exercise 4-5 times per week for 20 minutes.     Follow up in two weeks with blood glucose log       I, Dr. Angel Phillips M.D., have reviewed the encounter and agree with the assessment and plan as put forth by the Nurse Practitioner.

## 2024-03-15 ENCOUNTER — TELEPHONE (OUTPATIENT)
Dept: FAMILY MEDICINE | Facility: CLINIC | Age: 49
End: 2024-03-15
Payer: COMMERCIAL

## 2024-03-15 DIAGNOSIS — E11.9 TYPE 2 DIABETES MELLITUS WITHOUT COMPLICATION, WITHOUT LONG-TERM CURRENT USE OF INSULIN: Primary | ICD-10-CM

## 2024-03-25 ENCOUNTER — TELEPHONE (OUTPATIENT)
Dept: FAMILY MEDICINE | Facility: CLINIC | Age: 49
End: 2024-03-25
Payer: COMMERCIAL

## 2024-03-25 ENCOUNTER — OFFICE VISIT (OUTPATIENT)
Dept: FAMILY MEDICINE | Facility: CLINIC | Age: 49
End: 2024-03-25
Payer: COMMERCIAL

## 2024-03-25 VITALS
HEIGHT: 65 IN | HEART RATE: 84 BPM | BODY MASS INDEX: 33.54 KG/M2 | WEIGHT: 201.31 LBS | OXYGEN SATURATION: 100 % | RESPIRATION RATE: 12 BRPM | SYSTOLIC BLOOD PRESSURE: 124 MMHG | DIASTOLIC BLOOD PRESSURE: 85 MMHG

## 2024-03-25 DIAGNOSIS — I10 HYPERTENSION, UNSPECIFIED TYPE: Chronic | ICD-10-CM

## 2024-03-25 DIAGNOSIS — E11.9 TYPE 2 DIABETES MELLITUS WITHOUT COMPLICATION, WITHOUT LONG-TERM CURRENT USE OF INSULIN: Primary | Chronic | ICD-10-CM

## 2024-03-25 DIAGNOSIS — M32.9 SYSTEMIC LUPUS ERYTHEMATOSUS, UNSPECIFIED SLE TYPE, UNSPECIFIED ORGAN INVOLVEMENT STATUS: Chronic | ICD-10-CM

## 2024-03-25 LAB
EST. AVERAGE GLUCOSE BLD GHB EST-MCNC: 306 MG/DL
HBA1C MFR BLD HPLC: 12.3 % (ref 4.5–6.6)

## 2024-03-25 PROCEDURE — 3008F BODY MASS INDEX DOCD: CPT | Mod: ,,, | Performed by: NURSE PRACTITIONER

## 2024-03-25 PROCEDURE — 99212 OFFICE O/P EST SF 10 MIN: CPT | Mod: ,,, | Performed by: NURSE PRACTITIONER

## 2024-03-25 PROCEDURE — 3046F HEMOGLOBIN A1C LEVEL >9.0%: CPT | Mod: ,,, | Performed by: NURSE PRACTITIONER

## 2024-03-25 PROCEDURE — 83036 HEMOGLOBIN GLYCOSYLATED A1C: CPT | Mod: ,,, | Performed by: CLINICAL MEDICAL LABORATORY

## 2024-03-25 PROCEDURE — 1159F MED LIST DOCD IN RCRD: CPT | Mod: ,,, | Performed by: NURSE PRACTITIONER

## 2024-03-25 PROCEDURE — 1160F RVW MEDS BY RX/DR IN RCRD: CPT | Mod: ,,, | Performed by: NURSE PRACTITIONER

## 2024-03-25 PROCEDURE — 3079F DIAST BP 80-89 MM HG: CPT | Mod: ,,, | Performed by: NURSE PRACTITIONER

## 2024-03-25 PROCEDURE — 3074F SYST BP LT 130 MM HG: CPT | Mod: ,,, | Performed by: NURSE PRACTITIONER

## 2024-03-25 RX ORDER — PREGABALIN 75 MG/1
75 CAPSULE ORAL 3 TIMES DAILY
COMMUNITY
Start: 2024-03-14 | End: 2025-03-14

## 2024-03-25 RX ORDER — FOLIC ACID 1 MG/1
1 TABLET ORAL DAILY
COMMUNITY
Start: 2024-03-14 | End: 2025-03-14

## 2024-03-25 RX ORDER — LOVASTATIN 20 MG/1
20 TABLET ORAL DAILY
Qty: 90 TABLET | Refills: 0 | Status: CANCELLED | OUTPATIENT
Start: 2024-03-25 | End: 2024-06-23

## 2024-03-25 NOTE — TELEPHONE ENCOUNTER
----- Message from Jenny Jarvis sent at 3/25/2024 10:47 AM CDT -----  Patient called and requested a call back at [phone]

## 2024-03-25 NOTE — PROGRESS NOTES
"Clinic note     Patient name: Tequila Vázquez is a 49 y.o. female   Chief compliant   Chief Complaint   Patient presents with    Shoulder Pain    Arm Pain     Pt c/o right shoulder and arm pain x 2 days       Subjective     History of present illness   Follow up after metformin was d/c'd due to side effect of diarrhea, medication not tolerated   She reports right shoulder pain x two days, denies any known injury or trauma, no previous shoulder surgery; she reports pain is sharp "shooting" starting in right shoulder going down entire right arm   She was seen by neurology [facility] on 3/14/2024, several medication changes have been made at last office visit      Past Medical History: SLE, asthma, DM type 2, latent syphilis, myasthenia gravis, hyperlipidemia     Last A1c 13.5- this is due to repeat after 4/3/24   Checking blood glucose BID and prn  Blood glucose log : not in clinic, she states she forgot her blood glucose log but that her fasting readings have been 100-130  Glucometer: does not have in clinic      Hx of right eyelid ptosis and weakness of RUE and RLE, myasthenia gravis, recently seen by [facility] neurology,  Dr Desir and ANDREA Rogers, NP-C at  neurology [facility]  She has been receiving  IVIG infusions                Social History     Tobacco Use    Smoking status: Never     Passive exposure: Never    Smokeless tobacco: Current     Types: Snuff   Substance Use Topics    Alcohol use: Never    Drug use: Never       Review of patient's allergies indicates:   Allergen Reactions    Aspirin Hives       Past Medical History:   Diagnosis Date    Asthma     Diabetes mellitus     Late syphilis, 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 Added automatically from request for surgery [id number]    Systemic lupus erythematosus, organ or system involvement unspecified        Past Surgical History:   Procedure Laterality Date    " "DILATION AND CURETTAGE OF UTERUS  2000    preformed at [facility]    HYSTERECTOMY      MUSCLE BIOPSY      OOPHORECTOMY      pt has had one ovary removed, unsure side    TUBAL LIGATION  1999    [facility]        Family History   Problem Relation Age of Onset    Asthma Mother     Coronary artery disease Mother     Diabetes Father     Heart disease Father     Cancer Sister         Breast    Breast cancer Sister          Current Outpatient Medications:     folic acid (FOLVITE) 1 MG tablet, Take 1 tablet by mouth once daily., Disp: , Rfl:     methotrexate 5 MG tablet, Take 12.5mg on Saturday and 12.5mg on Sunday max 25mg weekly, Disp: , Rfl:     pregabalin (LYRICA) 75 MG capsule, Take 75 mg by mouth 3 (three) times daily., Disp: , Rfl:     albuterol (PROVENTIL/VENTOLIN HFA) 90 mcg/actuation inhaler, Inhale 2 puffs into the lungs every 6 (six) hours as needed for Wheezing. Rescue, Disp: 18 g, Rfl: 2    EASY TOUCH 31 gauge x 3/16" Ndle, USE one needle TO INJECT insulin ONCE DAILY as prescribed, Disp: , Rfl:     fluticasone propionate (FLONASE) 50 mcg/actuation nasal spray, 1 spray (50 mcg total) by Each Nostril route once daily., Disp: 16 g, Rfl: 2    insulin (BASAGLAR KWIKPEN U-100 INSULIN) glargine 100 units/mL SubQ pen, Inject 8 Units into the skin once daily., Disp: 7.2 mL, Rfl: 0    lancets (E-Z JECT LANCETS) 32 gauge Misc, 100 lancets by Misc.(Non-Drug; Combo Route) route. USE TO CHECK GLUCOSE ONCE DAILY, Disp: , Rfl:     lovastatin (MEVACOR) 20 MG tablet, Take 1 tablet (20 mg total) by mouth once daily., Disp: 90 tablet, Rfl: 0    PANZYGA 10 % Soln, Inject into the vein., Disp: , Rfl:     pen needle, diabetic 32 gauge x 1/4" Ndle, Use one needle to inject insulin daily as prescribed, Disp: 100 each, Rfl: 5    traZODone (DESYREL) 50 MG tablet, Take 1/2 to one tablet PO HS prn sleeep, Disp: 30 tablet, Rfl: 2    TRUEPLUS PEN NEEDLE 32 gauge x 5/32" Ndle, SMARTSIG:injection Daily, Disp: , Rfl:     Review of Systems " "  Constitutional:  Negative for appetite change, chills, fatigue, fever and unexpected weight change.   Respiratory:  Negative for cough and shortness of breath.    Cardiovascular:  Negative for chest pain, palpitations and leg swelling.   Gastrointestinal:  Negative for abdominal pain, change in bowel habit, constipation, diarrhea, nausea and vomiting.   Genitourinary:  Negative for dysuria and frequency.   Musculoskeletal:  Positive for arthralgias (right shoulder pain,"sharp shooting pain"). Negative for gait problem and myalgias.   Neurological:  Negative for dizziness, syncope, light-headedness and headaches.   Psychiatric/Behavioral:  Negative for dysphoric mood and sleep disturbance. The patient is not nervous/anxious.        Objective     /85   Pulse 84   Resp 12   Ht 5' 5" (1.651 m)   Wt 91.3 kg (201 lb 4.8 oz)   SpO2 100%   BMI 33.50 kg/m²     Physical Exam   Constitutional: She is oriented to person, place, and time. No distress.   HENT:   Head: Atraumatic.   Mouth/Throat: Mucous membranes are moist.   Eyes:   Right eye ptosis      Cardiovascular: Normal rate and regular rhythm. Pulmonary:      Effort: Pulmonary effort is normal. No respiratory distress.      Breath sounds: Normal breath sounds. No wheezing, rhonchi or rales.     Abdominal: Soft. Bowel sounds are normal. She exhibits no distension. There is no abdominal tenderness.   Musculoskeletal:         General: Normal range of motion.      Cervical back: Neck supple.      Right lower leg: No edema.      Left lower leg: No edema.      Comments: Entire right arm very tender to touch, no deformity, no edema or erythema noted    Neurological: She is alert and oriented to person, place, and time. Gait normal.   Skin: Skin is warm and dry.   Psychiatric: Her behavior is normal. Mood normal.       Lab Results   Component Value Date    WBC 5.17 02/16/2024    HGB 13.8 02/16/2024    HCT 40.9 02/16/2024    MCV 93.8 02/16/2024     02/16/2024 "       CMP  Sodium   Date Value Ref Range Status   02/16/2024 136 136 - 145 mmol/L Final     Potassium   Date Value Ref Range Status   02/16/2024 3.9 3.5 - 5.1 mmol/L Final     Chloride   Date Value Ref Range Status   02/16/2024 103 98 - 107 mmol/L Final     CO2   Date Value Ref Range Status   02/16/2024 24 21 - 32 mmol/L Final     Glucose   Date Value Ref Range Status   02/16/2024 197 (H) 74 - 106 mg/dL Final     BUN   Date Value Ref Range Status   02/16/2024 14 7 - 18 mg/dL Final     Creatinine   Date Value Ref Range Status   02/16/2024 0.93 0.55 - 1.02 mg/dL Final     Calcium   Date Value Ref Range Status   02/16/2024 8.7 8.5 - 10.1 mg/dL Final     Total Protein   Date Value Ref Range Status   02/16/2024 7.7 6.4 - 8.2 g/dL Final     Albumin   Date Value Ref Range Status   02/16/2024 3.2 (L) 3.5 - 5.0 g/dL Final     Bilirubin, Total   Date Value Ref Range Status   02/16/2024 0.4 >0.0 - 1.2 mg/dL Final     Alk Phos   Date Value Ref Range Status   02/16/2024 79 41 - 108 U/L Final     AST   Date Value Ref Range Status   02/16/2024 10 (L) 15 - 37 U/L Final     ALT   Date Value Ref Range Status   02/16/2024 18 13 - 56 U/L Final     Anion Gap   Date Value Ref Range Status   02/16/2024 13 7 - 16 mmol/L Final     eGFR    Date Value Ref Range Status   08/13/2021 67 >=60 mL/min/1.73m² Final     eGFR   Date Value Ref Range Status   04/13/2022 61 >=60 mL/min/1.73m² Final     Lab Results   Component Value Date    TSH 0.766 08/13/2021     Lab Results   Component Value Date    CHOL 232 (H) 06/12/2023    CHOL 216 (H) 04/13/2022    CHOL 250 (H) 04/19/2021     Lab Results   Component Value Date    HDL 67 (H) 06/12/2023    HDL 58 04/13/2022    HDL 54 04/19/2021     Lab Results   Component Value Date    LDLCALC 143 06/12/2023    LDLCALC 137 04/13/2022    LDLCALC 166 04/19/2021     Lab Results   Component Value Date    TRIG 112 06/12/2023    TRIG 106 04/13/2022    TRIG 150 04/19/2021     Lab Results   Component Value  Date    CHOLHDL 3.5 06/12/2023    CHOLHDL 3.7 04/13/2022    CHOLHDL 4.6 04/19/2021     Lab Results   Component Value Date    HGBA1C 13.5 (H) 01/04/2024         Assessment and Plan   Type 2 diabetes mellitus without complication, without long-term current use of insulin  -     Hemoglobin A1C    Hypertension, unspecified type    Systemic lupus erythematosus, unspecified SLE type, unspecified organ involvement status          Patient Instructions  Patient Instructions   Notify neurology [facility] today of your current symptoms given recent medication changes on 3/14/24    Bring blood glucose log by to  of clinic    Continue checking blood glucose two times daily, keep written blood glucose log, follow up in clinic in two weeks

## 2024-03-25 NOTE — PATIENT INSTRUCTIONS
Notify neurology [facility] today of your current symptoms given recent medication changes on 3/14/24    Bring blood glucose log by to  of clinic    Continue checking blood glucose two times daily, keep written blood glucose log, follow up in clinic in two weeks

## 2024-04-02 DIAGNOSIS — Z12.11 SPECIAL SCREENING FOR MALIGNANT NEOPLASMS, COLON: Primary | ICD-10-CM

## 2024-04-02 RX ORDER — POLYETHYLENE GLYCOL 3350, SODIUM SULFATE ANHYDROUS, SODIUM BICARBONATE, SODIUM CHLORIDE, POTASSIUM CHLORIDE 236; 22.74; 6.74; 5.86; 2.97 G/4L; G/4L; G/4L; G/4L; G/4L
4 POWDER, FOR SOLUTION ORAL ONCE
Qty: 4000 ML | Refills: 0 | Status: SHIPPED | OUTPATIENT
Start: 2024-04-02 | End: 2024-04-02

## 2024-04-03 ENCOUNTER — TELEPHONE (OUTPATIENT)
Dept: FAMILY MEDICINE | Facility: CLINIC | Age: 49
End: 2024-04-03
Payer: COMMERCIAL

## 2024-04-03 DIAGNOSIS — M32.9 SYSTEMIC LUPUS ERYTHEMATOSUS, UNSPECIFIED SLE TYPE, UNSPECIFIED ORGAN INVOLVEMENT STATUS: Chronic | ICD-10-CM

## 2024-04-03 DIAGNOSIS — M25.511 RIGHT SHOULDER PAIN, UNSPECIFIED CHRONICITY: Primary | ICD-10-CM

## 2024-04-03 NOTE — TELEPHONE ENCOUNTER
Right arm shoulder states it is no better. States she has talked with [place] and they told her to call her PCP.  Xray order of shoulder per FNP. States she has had the rash on her face come back like she does during a lupus flare up. Insursted pt to also touch base with her dr in [place]. Voiced understating.

## 2024-04-03 NOTE — TELEPHONE ENCOUNTER
----- Message from Erica Ca sent at 4/3/2024  1:16 PM CDT -----  Please call patient [phone]

## 2024-04-04 ENCOUNTER — HOSPITAL ENCOUNTER (OUTPATIENT)
Dept: RADIOLOGY | Facility: HOSPITAL | Age: 49
Discharge: HOME | End: 2024-04-04
Attending: NURSE PRACTITIONER
Payer: COMMERCIAL

## 2024-04-04 DIAGNOSIS — M32.9 SYSTEMIC LUPUS ERYTHEMATOSUS, UNSPECIFIED SLE TYPE, UNSPECIFIED ORGAN INVOLVEMENT STATUS: Chronic | ICD-10-CM

## 2024-04-04 DIAGNOSIS — M25.511 RIGHT SHOULDER PAIN, UNSPECIFIED CHRONICITY: Primary | ICD-10-CM

## 2024-04-04 PROCEDURE — 73030 X-RAY EXAM OF SHOULDER: CPT | Mod: TC,50

## 2024-04-09 DIAGNOSIS — Z71.89 COMPLEX CARE COORDINATION: ICD-10-CM

## 2024-04-16 ENCOUNTER — ANESTHESIA (OUTPATIENT)
Dept: GASTROENTEROLOGY | Facility: HOSPITAL | Age: 49
End: 2024-04-16
Payer: COMMERCIAL

## 2024-04-16 ENCOUNTER — HOSPITAL ENCOUNTER (OUTPATIENT)
Dept: GASTROENTEROLOGY | Facility: HOSPITAL | Age: 49
Discharge: HOME | End: 2024-04-16
Attending: FAMILY MEDICINE | Admitting: INTERNAL MEDICINE
Payer: COMMERCIAL

## 2024-04-16 ENCOUNTER — ANESTHESIA EVENT (OUTPATIENT)
Dept: GASTROENTEROLOGY | Facility: HOSPITAL | Age: 49
End: 2024-04-16
Payer: COMMERCIAL

## 2024-04-16 VITALS
BODY MASS INDEX: 33.49 KG/M2 | HEIGHT: 65 IN | OXYGEN SATURATION: 100 % | SYSTOLIC BLOOD PRESSURE: 117 MMHG | RESPIRATION RATE: 13 BRPM | TEMPERATURE: 98 F | HEART RATE: 61 BPM | DIASTOLIC BLOOD PRESSURE: 83 MMHG | WEIGHT: 201 LBS

## 2024-04-16 DIAGNOSIS — Z12.11 SCREENING FOR MALIGNANT NEOPLASM OF COLON: ICD-10-CM

## 2024-04-16 LAB — GLUCOSE SERPL-MCNC: 220 MG/DL (ref 70–105)

## 2024-04-16 PROCEDURE — 45385 COLONOSCOPY W/LESION REMOVAL: CPT | Mod: 33,,, | Performed by: INTERNAL MEDICINE

## 2024-04-16 PROCEDURE — 27201423 OPTIME MED/SURG SUP & DEVICES STERILE SUPPLY

## 2024-04-16 PROCEDURE — 88305 TISSUE EXAM BY PATHOLOGIST: CPT | Mod: 26,,, | Performed by: PATHOLOGY

## 2024-04-16 PROCEDURE — 45385 COLONOSCOPY W/LESION REMOVAL: CPT | Mod: PT | Performed by: INTERNAL MEDICINE

## 2024-04-16 PROCEDURE — 82962 GLUCOSE BLOOD TEST: CPT

## 2024-04-16 PROCEDURE — D9220A PRA ANESTHESIA: Mod: PT,,, | Performed by: NURSE ANESTHETIST, CERTIFIED REGISTERED

## 2024-04-16 PROCEDURE — 37000008 HC ANESTHESIA 1ST 15 MINUTES

## 2024-04-16 PROCEDURE — 45380 COLONOSCOPY AND BIOPSY: CPT | Mod: PT,59 | Performed by: INTERNAL MEDICINE

## 2024-04-16 PROCEDURE — 45380 COLONOSCOPY AND BIOPSY: CPT | Mod: 59,33,, | Performed by: INTERNAL MEDICINE

## 2024-04-16 PROCEDURE — 88305 TISSUE EXAM BY PATHOLOGIST: CPT | Mod: TC,91,SUR | Performed by: INTERNAL MEDICINE

## 2024-04-16 PROCEDURE — 25000003 PHARM REV CODE 250: Performed by: NURSE ANESTHETIST, CERTIFIED REGISTERED

## 2024-04-16 PROCEDURE — 63600175 PHARM REV CODE 636 W HCPCS: Performed by: NURSE ANESTHETIST, CERTIFIED REGISTERED

## 2024-04-16 PROCEDURE — 37000009 HC ANESTHESIA EA ADD 15 MINS

## 2024-04-16 RX ORDER — PROPOFOL 10 MG/ML
VIAL (ML) INTRAVENOUS
Status: DISCONTINUED | OUTPATIENT
Start: 2024-04-16 | End: 2024-04-16

## 2024-04-16 RX ORDER — LIDOCAINE HYDROCHLORIDE 20 MG/ML
INJECTION, SOLUTION EPIDURAL; INFILTRATION; INTRACAUDAL; PERINEURAL
Status: DISCONTINUED | OUTPATIENT
Start: 2024-04-16 | End: 2024-04-16

## 2024-04-16 RX ORDER — SODIUM CHLORIDE 0.9 % (FLUSH) 0.9 %
10 SYRINGE (ML) INJECTION
Status: DISCONTINUED | OUTPATIENT
Start: 2024-04-16 | End: 2024-04-17 | Stop reason: HOSPADM

## 2024-04-16 RX ADMIN — SODIUM CHLORIDE: 9 INJECTION, SOLUTION INTRAVENOUS at 09:04

## 2024-04-16 RX ADMIN — PROPOFOL 50 MG: 10 INJECTION, EMULSION INTRAVENOUS at 09:04

## 2024-04-16 RX ADMIN — LIDOCAINE HYDROCHLORIDE 50 MG: 20 INJECTION, SOLUTION INTRAVENOUS at 09:04

## 2024-04-16 NOTE — ANESTHESIA PREPROCEDURE EVALUATION
04/16/2024  Tequila Vázquez is a 49 y.o., female.      Pre-op Assessment    I have reviewed the Patient Summary Reports.     I have reviewed the Nursing Notes. I have reviewed the NPO Status.   I have reviewed the Medications.     Review of Systems  Anesthesia Hx:  No problems with previous Anesthesia             Denies Family Hx of Anesthesia complications.    Denies Personal Hx of Anesthesia complications.                    Social:  Non-Smoker       Hematology/Oncology:  Hematology Normal   Oncology Normal                                   EENT/Dental:  EENT/Dental Normal           Cardiovascular:     Hypertension              ECG has been reviewed.                    Hypertension         Pulmonary:    Asthma       Asthma:               Renal/:  Renal/ Normal                 Hepatic/GI:     GERD      Gerd          Musculoskeletal:  Musculoskeletal Normal    SLE            Neurological:    Neuromuscular Disease,  Headaches      Dx of Headaches                         Neuromuscular Disease   Endocrine:  Diabetes, type 2    Diabetes                      Dermatological:  Skin Normal    Psych:  Psychiatric Normal                Physical Exam  General: Well nourished    Airway:  Mallampati: II   Mouth Opening: Normal  TM Distance: Normal  Tongue: Normal  Neck ROM: Normal ROM    Dental:  Intact    Anesthesia Plan  Type of Anesthesia, risks & benefits discussed:    Anesthesia Type: Gen Natural Airway  Intra-op Monitoring Plan: Standard ASA Monitors  Post Op Pain Control Plan: multimodal analgesia  Induction:  IV  Informed Consent: Informed consent signed with the Patient and all parties understand the risks and agree with anesthesia plan.  All questions answered. Patient consented to blood products? Yes  ASA Score: 3  Day of Surgery Review of History & Physical: H&P Update referred to the surgeon/provider.I  have interviewed and examined the patient. I have reviewed the patient's H&P dated: There are no significant changes.     Ready For Surgery From Anesthesia Perspective.   .

## 2024-04-16 NOTE — DISCHARGE INSTRUCTIONS
Procedure Date  4/16/24     Impression  Overall Impression:   Three polyps measuring from 3 mm up to 10 mm in the cecum; performed cold forceps biopsy; performed cold snare removal  3 subcentimeter polyps were removed  The ileocecal valve, transverse colon and descending colon appeared normal.  (grade 1) hemorrhoids        Recommendation  Await pathology results   Repeat colonoscopy in 3 years         Outcome of procedure: successful Colonoscopy  Disposition: patient to recovery following procedure; discharge to home when appropriate parameters met  Provisions for follow up: please call my office for any unexpected symptoms like chest or abdominal pain or bleeding following your procedure.  Final Diagnosis: colon polyps     THE NURSE WILL CALL YOU WITH YOUR BIOPSY RESULTS IN A FEW DAYS. IF YOU HAVE  [facility] MYCHART YOUR RESULTS WILL APPEAR THERE AS WELL.  NO DRIVING, OPERATING EQUIPMENT, OR SIGNING LEGAL DOCUMENTS FOR 24 HOURS.

## 2024-04-16 NOTE — TRANSFER OF CARE
"Anesthesia Transfer of Care Note    Patient: Tequila Vázquez    Procedure(s) Performed: Colonoscopy    Patient location: GI    Anesthesia Type: general    Transport from OR: Transported from OR on room air with adequate spontaneous ventilation. Continuous ECG monitoring in transport. Continuous SpO2 monitoring in transport    Post pain: adequate analgesia    Post assessment: no apparent anesthetic complications    Post vital signs: stable    Level of consciousness: responds to stimulation, awake and sedated    Nausea/Vomiting: no nausea/vomiting    Complications: none    Transfer of care protocol was followed      Last vitals: Visit Vitals  /84 (BP Location: Right arm, Patient Position: Lying)   Pulse 95   Temp 36.6 °C (97.9 °F) (Oral)   Resp 16   Ht 5' 5" (1.651 m)   Wt 91.2 kg (201 lb)   SpO2 100%   BMI 33.45 kg/m²     "

## 2024-04-16 NOTE — ANESTHESIA POSTPROCEDURE EVALUATION
Anesthesia Post Evaluation    Patient: Tequila Vázquez    Procedure(s) Performed: Colonoscopy    Final Anesthesia Type: general      Patient location during evaluation: GI PACU  Patient participation: Yes- Able to Participate  Level of consciousness: awake and alert and oriented  Post-procedure vital signs: reviewed and stable  Pain management: adequate  Airway patency: patent    PONV status at discharge: No PONV  Anesthetic complications: no      Cardiovascular status: blood pressure returned to baseline and stable  Respiratory status: unassisted, spontaneous ventilation and room air  Hydration status: euvolemic  Follow-up not needed.  Comments: Refer to nursing note for pain/shira score upon discharge from recovery.               Vitals Value Taken Time   /84 04/16/24 0929   Temp 36.6 °C (97.9 °F) 04/16/24 0929   Pulse 95 04/16/24 0929   Resp 16 04/16/24 0929   SpO2 100 % 04/16/24 0929         No case tracking events are documented in the log.      Pain/Shira Score: No data recorded

## 2024-04-16 NOTE — H&P
Gastroenterology Pre-procedure H&P    History of Present Illness    Tequila Vázquez is a 49 y.o. 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     Patient here for routine index screening     Patient denies wt loss, abdominal pain, diarrhea, melena/hematochezia, change in stool caliber, no anticoagulants, FMHx of GI related malignancies.      Past Medical History:   Diagnosis Date    Asthma     Diabetes mellitus     Late syphilis, 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 Added automatically from request for surgery [id number]    Systemic lupus erythematosus, organ or system involvement unspecified        Past Surgical History:   Procedure Laterality Date    DILATION AND CURETTAGE OF UTERUS  2000    preformed at [facility]    HYSTERECTOMY      MUSCLE BIOPSY      OOPHORECTOMY      pt has had one ovary removed, unsure side    TUBAL LIGATION  1999    [facility]       Family History   Problem Relation Name Age of Onset    Asthma Mother      Coronary artery disease Mother      Diabetes Father      Heart disease Father      Cancer Sister          Breast    Breast cancer Sister         Social History     Socioeconomic History    Marital status: Legally    Tobacco Use    Smoking status: Never     Passive exposure: Never    Smokeless tobacco: Current     Types: Snuff   Substance and Sexual Activity    Alcohol use: Never    Drug use: Never    Sexual activity: Yes     Social Determinants of Health     Financial Resource Strain: Low Risk  (1/4/2024)    Overall Financial Resource Strain (CARDIA)     Difficulty of Paying Living Expenses: Not very hard   Food Insecurity: Food Insecurity Present (1/4/2024)    Hunger Vital Sign      "Worried About Running Out of Food in the Last Year: Sometimes true     Ran Out of Food in the Last Year: Never true   Transportation Needs: No Transportation Needs (1/4/2024)    PRAPARE - Transportation     Lack of Transportation (Medical): No     Lack of Transportation (Non-Medical): No   Physical Activity: Sufficiently Active (1/4/2024)    Exercise Vital Sign     Days of Exercise per Week: 7 days     Minutes of Exercise per Session: 30 min   Stress: Stress Concern Present (1/4/2024)    [employer] - Occupational Stress Questionnaire     Feeling of Stress : Very much   Social Connections: Moderately Isolated (1/4/2024)    Social Connection and Isolation Panel [NHANES]     Frequency of Communication with Friends and Family: More than three times a week     Frequency of Social Gatherings with Friends and Family: Twice a week     Attends Temple Services: More than 4 times per year     Active Member of Clubs or Organizations: No     Attends Club or Organization Meetings: Never     Marital Status:    Housing Stability: High Risk (1/4/2024)    Housing Stability Vital Sign     Unable to Pay for Housing in the Last Year: Yes     Number of Places Lived in the Last Year: 1     Unstable Housing in the Last Year: No       Current Outpatient Medications   Medication Sig Dispense Refill    albuterol (PROVENTIL/VENTOLIN HFA) 90 mcg/actuation inhaler Inhale 2 puffs into the lungs every 6 (six) hours as needed for Wheezing. Rescue 18 g 2    EASY TOUCH 31 gauge x 3/16" Ndle USE one needle TO INJECT insulin ONCE DAILY as prescribed      fluticasone propionate (FLONASE) 50 mcg/actuation nasal spray 1 spray (50 mcg total) by Each Nostril route once daily. 16 g 2    folic acid (FOLVITE) 1 MG tablet Take 1 tablet by mouth once daily.      insulin (BASAGLAR KWIKPEN U-100 INSULIN) glargine 100 units/mL SubQ pen Inject 8 Units into the skin once daily. 7.2 mL 0    lancets (E-Z JECT LANCETS) 32 gauge " "Misc 100 lancets by Misc.(Non-Drug; Combo Route) route. USE TO CHECK GLUCOSE ONCE DAILY      lovastatin (MEVACOR) 20 MG tablet Take 1 tablet (20 mg total) by mouth once daily. 90 tablet 0    methotrexate 5 MG tablet Take 12.5mg on Saturday and 12.5mg on Sunday max 25mg weekly      PANZYGA 10 % Soln Inject into the vein.      pen needle, diabetic 32 gauge x 1/4" Ndle Use one needle to inject insulin daily as prescribed 100 each 5    pregabalin (LYRICA) 75 MG capsule Take 75 mg by mouth 3 (three) times daily.      traZODone (DESYREL) 50 MG tablet Take 1/2 to one tablet PO HS prn sleeep 30 tablet 2    TRUEPLUS PEN NEEDLE 32 gauge x 5/32" Ndle SMARTSIG:injection Daily       No current facility-administered medications for this encounter.       Review of patient's allergies indicates:   Allergen Reactions    Aspirin Hives       Objective:  There were no vitals filed for this visit.     GEN: normal appearing, NAD, AAO x3  HENT: NCAT, anicteric, OP benign  CV: normal rate, regular rhythm  RESP: NABS, symmetric rise, unlabored  ABD: soft, ND, no guarding or TTP  SKIN: warm and dry  NEURO: grossly afocal    Assessment and Plan:    Proceed with:    Colonoscopy for screening for colon cancer    Jevon Dutton MD  Gastroenterology  "

## 2024-04-22 LAB
ESTROGEN SERPL-MCNC: NORMAL PG/ML
INSULIN SERPL-ACNC: NORMAL U[IU]/ML
LAB AP GROSS DESCRIPTION: NORMAL
LAB AP LABORATORY NOTES: NORMAL
T3RU NFR SERPL: NORMAL %

## 2024-04-22 NOTE — PROGRESS NOTES
Mrs. Schmidt, thank you for referring this patient to me. I recommend repeat colonoscopy in 3 years. Please let me know if you have any questions regarding this patient.

## 2024-06-18 DIAGNOSIS — I10 HYPERTENSION, UNSPECIFIED TYPE: Chronic | ICD-10-CM

## 2024-06-18 RX ORDER — LOVASTATIN 20 MG/1
20 TABLET ORAL DAILY
Qty: 30 TABLET | Refills: 0 | Status: SHIPPED | OUTPATIENT
Start: 2024-06-18

## 2024-07-11 ENCOUNTER — OFFICE VISIT (OUTPATIENT)
Dept: FAMILY MEDICINE | Facility: CLINIC | Age: 49
End: 2024-07-11
Payer: COMMERCIAL

## 2024-07-11 VITALS
HEART RATE: 79 BPM | HEIGHT: 65 IN | SYSTOLIC BLOOD PRESSURE: 113 MMHG | DIASTOLIC BLOOD PRESSURE: 79 MMHG | BODY MASS INDEX: 33.72 KG/M2 | WEIGHT: 202.38 LBS | OXYGEN SATURATION: 98 %

## 2024-07-11 DIAGNOSIS — E11.9 TYPE 2 DIABETES MELLITUS WITHOUT COMPLICATION, WITHOUT LONG-TERM CURRENT USE OF INSULIN: Primary | Chronic | ICD-10-CM

## 2024-07-11 DIAGNOSIS — J30.2 SEASONAL ALLERGIES: Chronic | ICD-10-CM

## 2024-07-11 DIAGNOSIS — M32.9 SYSTEMIC LUPUS ERYTHEMATOSUS, UNSPECIFIED SLE TYPE, UNSPECIFIED ORGAN INVOLVEMENT STATUS: Chronic | ICD-10-CM

## 2024-07-11 DIAGNOSIS — Z72.0 SMOKELESS TOBACCO USE: Chronic | ICD-10-CM

## 2024-07-11 DIAGNOSIS — G47.00 INSOMNIA, UNSPECIFIED TYPE: Chronic | ICD-10-CM

## 2024-07-11 DIAGNOSIS — E78.5 HYPERLIPIDEMIA, UNSPECIFIED HYPERLIPIDEMIA TYPE: Chronic | ICD-10-CM

## 2024-07-11 DIAGNOSIS — I10 HYPERTENSION, UNSPECIFIED TYPE: Chronic | ICD-10-CM

## 2024-07-11 DIAGNOSIS — J45.909 ASTHMA, UNSPECIFIED ASTHMA SEVERITY, UNSPECIFIED WHETHER COMPLICATED, UNSPECIFIED WHETHER PERSISTENT: Chronic | ICD-10-CM

## 2024-07-11 PROCEDURE — 3078F DIAST BP <80 MM HG: CPT | Mod: ,,, | Performed by: NURSE PRACTITIONER

## 2024-07-11 PROCEDURE — 3074F SYST BP LT 130 MM HG: CPT | Mod: ,,, | Performed by: NURSE PRACTITIONER

## 2024-07-11 PROCEDURE — 80061 LIPID PANEL: CPT | Mod: ,,, | Performed by: CLINICAL MEDICAL LABORATORY

## 2024-07-11 PROCEDURE — 82570 ASSAY OF URINE CREATININE: CPT | Mod: ,,, | Performed by: CLINICAL MEDICAL LABORATORY

## 2024-07-11 PROCEDURE — 83036 HEMOGLOBIN GLYCOSYLATED A1C: CPT | Mod: ,,, | Performed by: CLINICAL MEDICAL LABORATORY

## 2024-07-11 PROCEDURE — 3008F BODY MASS INDEX DOCD: CPT | Mod: ,,, | Performed by: NURSE PRACTITIONER

## 2024-07-11 PROCEDURE — 3046F HEMOGLOBIN A1C LEVEL >9.0%: CPT | Mod: ,,, | Performed by: NURSE PRACTITIONER

## 2024-07-11 PROCEDURE — 1160F RVW MEDS BY RX/DR IN RCRD: CPT | Mod: ,,, | Performed by: NURSE PRACTITIONER

## 2024-07-11 PROCEDURE — 1159F MED LIST DOCD IN RCRD: CPT | Mod: ,,, | Performed by: NURSE PRACTITIONER

## 2024-07-11 PROCEDURE — 82043 UR ALBUMIN QUANTITATIVE: CPT | Mod: ,,, | Performed by: CLINICAL MEDICAL LABORATORY

## 2024-07-11 PROCEDURE — 99214 OFFICE O/P EST MOD 30 MIN: CPT | Mod: ,,, | Performed by: NURSE PRACTITIONER

## 2024-07-11 RX ORDER — INSULIN GLARGINE 100 [IU]/ML
8 INJECTION, SOLUTION SUBCUTANEOUS DAILY
Qty: 7.2 ML | Refills: 0 | Status: SHIPPED | OUTPATIENT
Start: 2024-07-11 | End: 2024-10-09

## 2024-07-11 RX ORDER — FLUTICASONE PROPIONATE 50 MCG
1 SPRAY, SUSPENSION (ML) NASAL DAILY
Qty: 16 G | Refills: 2 | Status: SHIPPED | OUTPATIENT
Start: 2024-07-11

## 2024-07-11 RX ORDER — ALBUTEROL SULFATE 90 UG/1
2 AEROSOL, METERED RESPIRATORY (INHALATION) EVERY 6 HOURS PRN
Qty: 18 G | Refills: 2 | Status: SHIPPED | OUTPATIENT
Start: 2024-07-11

## 2024-07-11 RX ORDER — TRAZODONE HYDROCHLORIDE 50 MG/1
TABLET ORAL
Qty: 30 TABLET | Refills: 2 | Status: SHIPPED | OUTPATIENT
Start: 2024-07-11

## 2024-07-11 RX ORDER — LOVASTATIN 20 MG/1
20 TABLET ORAL DAILY
Qty: 30 TABLET | Refills: 0 | Status: SHIPPED | OUTPATIENT
Start: 2024-07-11

## 2024-07-11 RX ORDER — AMITRIPTYLINE HYDROCHLORIDE 25 MG/1
25 TABLET, FILM COATED ORAL NIGHTLY
COMMUNITY
Start: 2024-06-28

## 2024-07-11 NOTE — PATIENT INSTRUCTIONS
Lab obtained in clinic today, we will notify you of results and any necessary changes to plan of care   Refills on routine medications   Follow up on 10/03/2024 and as needed; routine medication will be due 10/09/2024    Pt is advised to monitor and document glucose fasting when you wake up before you eat and 2 hours after meal and bring bg log in to all clinic visits, this allows for medications to be adjusted if needed and it will also allow insurance to continue to cover your testing supplies     Ensure to take medications as directed.    Follow diabetic diet as directed.    Work to achieve normal body weight.   Ensure to exercise 4-5 times per week for 20 minutes.

## 2024-07-11 NOTE — PROGRESS NOTES
Clinic note     Patient name: Tequila Vázquez is a 49 y.o. female   Chief compliant   Chief Complaint   Patient presents with    Medication Refill     Here for med refills.  No problems at this time. Accuc check the highest is 224, lowest 110. Currently in PT for her neck.        Subjective     History of present illness   In clinic for routine follow up and medication refills     Past Medical History: SLE, asthma, DM type 2, latent syphilis, myasthenia gravis, hyperlipidemia     Currently taking basaglar insulin 8 units daily, Lantus and tresiba were both declined by insurance  Last A1c 12.3, will be rechecked today   Checking blood glucose BID and prn  Blood glucose log : not in clinic, reports lowest fasting bg 110, highest bg reading 224  Glucometer: does not have in clinic   Seeing Dr Camacho for pain management, had cervical MRI yesterday, currently participating in physical therapy for cervicalgia      Hx of right eyelid ptosis and weakness of RUE and RLE, myasthenia gravis, SLE,  recently seen by [facility] neurology,  Dr Desir and ANDREA Rogers NP-C at  neurology [facility]  She has been receiving  IVIG infusions    Eye exam due: encouraged to schedule diabetic eye exam with provider of her choice                 Social History     Tobacco Use    Smoking status: Never     Passive exposure: Never    Smokeless tobacco: Current     Types: Snuff   Substance Use Topics    Alcohol use: Never    Drug use: Never       Review of patient's allergies indicates:   Allergen Reactions    Aspirin Hives       Past Medical History:   Diagnosis Date    Asthma     Diabetes mellitus     Late syphilis, 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 Added automatically from request for surgery [id number]    Systemic lupus erythematosus, organ or system involvement unspecified        Past Surgical History:   Procedure Laterality Date    DILATION  "AND CURETTAGE OF UTERUS  2000    preformed at [facility]    HYSTERECTOMY      MUSCLE BIOPSY      OOPHORECTOMY      pt has had one ovary removed, unsure side    TUBAL LIGATION  1999    [facility]        Family History   Problem Relation Name Age of Onset    Asthma Mother      Coronary artery disease Mother      Diabetes Father      Heart disease Father      Cancer Sister          Breast    Breast cancer Sister           Current Outpatient Medications:     amitriptyline (ELAVIL) 25 MG tablet, Take 25 mg by mouth every evening., Disp: , Rfl:     folic acid (FOLVITE) 1 MG tablet, Take 1 tablet by mouth once daily., Disp: , Rfl:     methotrexate 5 MG tablet, Take 12.5mg on Saturday and 12.5mg on Sunday max 25mg weekly, Disp: , Rfl:     PANZYGA 10 % Soln, Inject into the vein once a week., Disp: , Rfl:     pregabalin (LYRICA) 75 MG capsule, Take 75 mg by mouth 3 (three) times daily., Disp: , Rfl:     albuterol (PROVENTIL/VENTOLIN HFA) 90 mcg/actuation inhaler, Inhale 2 puffs into the lungs every 6 (six) hours as needed for Wheezing. Rescue, Disp: 18 g, Rfl: 2    EASY TOUCH 31 gauge x 3/16" Ndle, USE one needle TO INJECT insulin ONCE DAILY as prescribed, Disp: , Rfl:     fluticasone propionate (FLONASE) 50 mcg/actuation nasal spray, 1 spray (50 mcg total) by Each Nostril route once daily., Disp: 16 g, Rfl: 2    insulin glargine U-100, Lantus, (BASAGLAR KWIKPEN U-100 INSULIN) 100 unit/mL (3 mL) InPn pen, Inject 8 Units into the skin once daily., Disp: 7.2 mL, Rfl: 0    lancets (E-Z JECT LANCETS) 32 gauge Misc, 100 lancets by Misc.(Non-Drug; Combo Route) route. USE TO CHECK GLUCOSE ONCE DAILY, Disp: , Rfl:     lovastatin (MEVACOR) 20 MG tablet, Take 1 tablet (20 mg total) by mouth once daily., Disp: 30 tablet, Rfl: 0    pen needle, diabetic 32 gauge x 1/4" Ndle, Use one needle to inject insulin daily as prescribed, Disp: 100 each, Rfl: 5    traZODone (DESYREL) 50 MG tablet, Take 1/2 to one tablet PO HS prn sleeep, Disp: " "30 tablet, Rfl: 2    TRUEPLUS PEN NEEDLE 32 gauge x 5/32" Ndle, SMARTSIG:injection Daily, Disp: , Rfl:     Review of Systems   Constitutional:  Negative for appetite change, chills, fatigue, fever and unexpected weight change.   Respiratory:  Negative for cough and shortness of breath.    Cardiovascular:  Negative for chest pain, palpitations and leg swelling.   Gastrointestinal:  Negative for abdominal pain, change in bowel habit, constipation, diarrhea, nausea and vomiting.   Genitourinary:  Negative for dysuria and frequency.   Musculoskeletal:  Positive for arthralgias and neck pain. Negative for gait problem and myalgias.   Neurological:  Negative for dizziness, syncope, light-headedness and headaches.   Psychiatric/Behavioral:  Negative for dysphoric mood and sleep disturbance. The patient is not nervous/anxious.        Objective     /79   Pulse 79   Ht 5' 5" (1.651 m)   Wt 91.8 kg (202 lb 6.4 oz)   SpO2 98%   BMI 33.68 kg/m²     Physical Exam   Constitutional: She is oriented to person, place, and time. No distress.   HENT:   Head: Atraumatic.   Mouth/Throat: Mucous membranes are moist.   Eyes:   Right eye ptosis       Cardiovascular: Normal rate and regular rhythm. Pulmonary:      Effort: Pulmonary effort is normal. No respiratory distress.      Breath sounds: Normal breath sounds. No wheezing, rhonchi or rales.     Abdominal: Soft. Bowel sounds are normal. She exhibits no distension. There is no abdominal tenderness.   Musculoskeletal:         General: Normal range of motion.      Cervical back: Neck supple.      Right lower leg: No edema.      Left lower leg: No edema.   Neurological: She is alert and oriented to person, place, and time. Gait normal.   Skin: Skin is warm and dry.   Psychiatric: Her behavior is normal. Mood normal.       Lab Results   Component Value Date    WBC 5.17 02/16/2024    HGB 13.8 02/16/2024    HCT 40.9 02/16/2024    MCV 93.8 02/16/2024     02/16/2024 "       CMP  Sodium   Date Value Ref Range Status   02/16/2024 136 136 - 145 mmol/L Final     Potassium   Date Value Ref Range Status   02/16/2024 3.9 3.5 - 5.1 mmol/L Final     Chloride   Date Value Ref Range Status   02/16/2024 103 98 - 107 mmol/L Final     CO2   Date Value Ref Range Status   02/16/2024 24 21 - 32 mmol/L Final     Glucose   Date Value Ref Range Status   02/16/2024 197 (H) 74 - 106 mg/dL Final     BUN   Date Value Ref Range Status   02/16/2024 14 7 - 18 mg/dL Final     Creatinine   Date Value Ref Range Status   02/16/2024 0.93 0.55 - 1.02 mg/dL Final     Calcium   Date Value Ref Range Status   02/16/2024 8.7 8.5 - 10.1 mg/dL Final     Total Protein   Date Value Ref Range Status   02/16/2024 7.7 6.4 - 8.2 g/dL Final     Albumin   Date Value Ref Range Status   02/16/2024 3.2 (L) 3.5 - 5.0 g/dL Final     Bilirubin, Total   Date Value Ref Range Status   02/16/2024 0.4 >0.0 - 1.2 mg/dL Final     Alk Phos   Date Value Ref Range Status   02/16/2024 79 41 - 108 U/L Final     AST   Date Value Ref Range Status   02/16/2024 10 (L) 15 - 37 U/L Final     ALT   Date Value Ref Range Status   02/16/2024 18 13 - 56 U/L Final     Anion Gap   Date Value Ref Range Status   02/16/2024 13 7 - 16 mmol/L Final     eGFR    Date Value Ref Range Status   08/13/2021 67 >=60 mL/min/1.73m² Final     eGFR   Date Value Ref Range Status   04/13/2022 61 >=60 mL/min/1.73m² Final     Lab Results   Component Value Date    TSH 0.766 08/13/2021     Lab Results   Component Value Date    CHOL 232 (H) 06/12/2023    CHOL 216 (H) 04/13/2022    CHOL 250 (H) 04/19/2021     Lab Results   Component Value Date    HDL 67 (H) 06/12/2023    HDL 58 04/13/2022    HDL 54 04/19/2021     Lab Results   Component Value Date    LDLCALC 143 06/12/2023    LDLCALC 137 04/13/2022    LDLCALC 166 04/19/2021     Lab Results   Component Value Date    TRIG 112 06/12/2023    TRIG 106 04/13/2022    TRIG 150 04/19/2021     Lab Results   Component Value  Date    CHOLHDL 3.5 06/12/2023    CHOLHDL 3.7 04/13/2022    CHOLHDL 4.6 04/19/2021     Lab Results   Component Value Date    HGBA1C 12.3 (H) 03/25/2024         Assessment and Plan   Type 2 diabetes mellitus without complication, without long-term current use of insulin  -     Hemoglobin A1C; Future; Expected date: 07/11/2024  -     Microalbumin/Creatinine Ratio, Urine; Future; Expected date: 07/11/2024  -     insulin glargine U-100, Lantus, (BASAGLAR KWIKPEN U-100 INSULIN) 100 unit/mL (3 mL) InPn pen; Inject 8 Units into the skin once daily.  Dispense: 7.2 mL; Refill: 0    Hypertension, unspecified type  -     lovastatin (MEVACOR) 20 MG tablet; Take 1 tablet (20 mg total) by mouth once daily.  Dispense: 30 tablet; Refill: 0    Hyperlipidemia, unspecified hyperlipidemia type  -     Lipid Panel; Future; Expected date: 07/11/2024    Asthma, unspecified asthma severity, unspecified whether complicated, unspecified whether persistent  -     albuterol (PROVENTIL/VENTOLIN HFA) 90 mcg/actuation inhaler; Inhale 2 puffs into the lungs every 6 (six) hours as needed for Wheezing. Rescue  Dispense: 18 g; Refill: 2    Insomnia, unspecified type  -     traZODone (DESYREL) 50 MG tablet; Take 1/2 to one tablet PO HS prn sleeep  Dispense: 30 tablet; Refill: 2    Seasonal allergies  -     fluticasone propionate (FLONASE) 50 mcg/actuation nasal spray; 1 spray (50 mcg total) by Each Nostril route once daily.  Dispense: 16 g; Refill: 2    Systemic lupus erythematosus, unspecified SLE type, unspecified organ involvement status  Comments:  Dr Desir [facility]    Smokeless tobacco use            Patient Instructions  Patient Instructions   Lab obtained in clinic today, we will notify you of results and any necessary changes to plan of care   Refills on routine medications   Follow up on 10/03/2024 and as needed; routine medication will be due 10/09/2024    Pt is advised to monitor and document glucose fasting when you wake up before you eat and  2 hours after meal and bring bg log in to all clinic visits, this allows for medications to be adjusted if needed and it will also allow insurance to continue to cover your testing supplies     Ensure to take medications as directed.    Follow diabetic diet as directed.    Work to achieve normal body weight.   Ensure to exercise 4-5 times per week for 20 minutes.

## 2024-07-12 LAB
CHOLEST SERPL-MCNC: 216 MG/DL (ref 0–200)
CHOLEST/HDLC SERPL: 4.2 {RATIO}
CREAT UR-MCNC: 95 MG/DL (ref 28–219)
EST. AVERAGE GLUCOSE BLD GHB EST-MCNC: 232 MG/DL
HBA1C MFR BLD HPLC: 9.7 % (ref 4.5–6.6)
HDLC SERPL-MCNC: 51 MG/DL (ref 40–60)
LDLC SERPL CALC-MCNC: 133 MG/DL
LDLC/HDLC SERPL: 2.6 {RATIO}
MICROALBUMIN UR-MCNC: <0.5 MG/DL (ref 0–2.8)
MICROALBUMIN/CREAT RATIO PNL UR: <5.3 MG/G (ref 0–30)
NONHDLC SERPL-MCNC: 165 MG/DL
TRIGL SERPL-MCNC: 162 MG/DL (ref 35–150)
VLDLC SERPL-MCNC: 32 MG/DL

## 2024-08-21 ENCOUNTER — TELEPHONE (OUTPATIENT)
Dept: FAMILY MEDICINE | Facility: CLINIC | Age: 49
End: 2024-08-21
Payer: COMMERCIAL

## 2024-08-21 NOTE — TELEPHONE ENCOUNTER
Returned pt call. States she feels like her Vit D is low. Feels fatigued, headaches, spots on tongue. Would like lab to be checked.

## 2024-08-22 NOTE — TELEPHONE ENCOUNTER
She will need follow up visit, A1c was elevated in July; need her to check bg bid and keep written log; bring in to follow up with her and we can check vit d at that time if needed

## 2024-08-26 ENCOUNTER — LAB VISIT (OUTPATIENT)
Dept: LAB | Facility: HOSPITAL | Age: 49
End: 2024-08-26
Attending: NURSE PRACTITIONER
Payer: COMMERCIAL

## 2024-08-26 ENCOUNTER — OFFICE VISIT (OUTPATIENT)
Dept: FAMILY MEDICINE | Facility: CLINIC | Age: 49
End: 2024-08-26
Payer: COMMERCIAL

## 2024-08-26 VITALS
OXYGEN SATURATION: 100 % | HEART RATE: 92 BPM | DIASTOLIC BLOOD PRESSURE: 75 MMHG | SYSTOLIC BLOOD PRESSURE: 105 MMHG | HEIGHT: 65 IN | WEIGHT: 200.38 LBS | BODY MASS INDEX: 33.38 KG/M2

## 2024-08-26 DIAGNOSIS — R53.83 FATIGUE, UNSPECIFIED TYPE: ICD-10-CM

## 2024-08-26 DIAGNOSIS — E66.09 CLASS 1 OBESITY DUE TO EXCESS CALORIES WITH SERIOUS COMORBIDITY AND BODY MASS INDEX (BMI) OF 33.0 TO 33.9 IN ADULT: Chronic | ICD-10-CM

## 2024-08-26 DIAGNOSIS — E55.9 VITAMIN D DEFICIENCY: ICD-10-CM

## 2024-08-26 DIAGNOSIS — F41.9 ANXIETY: ICD-10-CM

## 2024-08-26 DIAGNOSIS — M32.9 SYSTEMIC LUPUS ERYTHEMATOSUS, UNSPECIFIED SLE TYPE, UNSPECIFIED ORGAN INVOLVEMENT STATUS: Chronic | ICD-10-CM

## 2024-08-26 DIAGNOSIS — F32.A DEPRESSION, UNSPECIFIED DEPRESSION TYPE: ICD-10-CM

## 2024-08-26 DIAGNOSIS — I10 HYPERTENSION, UNSPECIFIED TYPE: Chronic | ICD-10-CM

## 2024-08-26 DIAGNOSIS — E11.9 TYPE 2 DIABETES MELLITUS WITHOUT COMPLICATION, WITHOUT LONG-TERM CURRENT USE OF INSULIN: Chronic | ICD-10-CM

## 2024-08-26 DIAGNOSIS — Z79.899 HIGH RISK MEDICATION USE: ICD-10-CM

## 2024-08-26 DIAGNOSIS — E11.9 TYPE 2 DIABETES MELLITUS WITHOUT COMPLICATION, WITHOUT LONG-TERM CURRENT USE OF INSULIN: Primary | Chronic | ICD-10-CM

## 2024-08-26 PROBLEM — E66.811 CLASS 1 OBESITY DUE TO EXCESS CALORIES WITH SERIOUS COMORBIDITY AND BODY MASS INDEX (BMI) OF 33.0 TO 33.9 IN ADULT: Status: ACTIVE | Noted: 2024-08-26

## 2024-08-26 LAB
25(OH)D3 SERPL-MCNC: 19.8 NG/ML
VIT B12 SERPL-MCNC: 569 PG/ML (ref 193–986)

## 2024-08-26 PROCEDURE — 3074F SYST BP LT 130 MM HG: CPT | Mod: ,,, | Performed by: NURSE PRACTITIONER

## 2024-08-26 PROCEDURE — 3046F HEMOGLOBIN A1C LEVEL >9.0%: CPT | Mod: ,,, | Performed by: NURSE PRACTITIONER

## 2024-08-26 PROCEDURE — 82607 VITAMIN B-12: CPT

## 2024-08-26 PROCEDURE — 3078F DIAST BP <80 MM HG: CPT | Mod: ,,, | Performed by: NURSE PRACTITIONER

## 2024-08-26 PROCEDURE — 1159F MED LIST DOCD IN RCRD: CPT | Mod: ,,, | Performed by: NURSE PRACTITIONER

## 2024-08-26 PROCEDURE — 99213 OFFICE O/P EST LOW 20 MIN: CPT | Mod: ,,, | Performed by: NURSE PRACTITIONER

## 2024-08-26 PROCEDURE — 3066F NEPHROPATHY DOC TX: CPT | Mod: ,,, | Performed by: NURSE PRACTITIONER

## 2024-08-26 PROCEDURE — 36415 COLL VENOUS BLD VENIPUNCTURE: CPT

## 2024-08-26 PROCEDURE — 1160F RVW MEDS BY RX/DR IN RCRD: CPT | Mod: ,,, | Performed by: NURSE PRACTITIONER

## 2024-08-26 PROCEDURE — 3061F NEG MICROALBUMINURIA REV: CPT | Mod: ,,, | Performed by: NURSE PRACTITIONER

## 2024-08-26 PROCEDURE — 3008F BODY MASS INDEX DOCD: CPT | Mod: ,,, | Performed by: NURSE PRACTITIONER

## 2024-08-26 PROCEDURE — 82306 VITAMIN D 25 HYDROXY: CPT

## 2024-08-26 NOTE — PATIENT INSTRUCTIONS
Lab ordered in clinic today, we will notify you of results and any necessary changes to plan of care, no lab available in clinic today, you can go over to hospital for these to be obtained     Increase basaglar insulin to 12 units daily     Pt is advised to monitor and document glucose fasting when you wake up before you eat and 2 hours after meal and bring bg log in to follow up visit in two weeks     Ensure to take medications as directed.    Follow diabetic diet as directed.    Work to achieve normal body weight.   Ensure to exercise 4-5 times per week for 20 minutes.     Referral place to psychiatry: the [facility] in [place]

## 2024-08-26 NOTE — PROGRESS NOTES
Clinic note     Patient name: Tequila Vázquez is a 49 y.o. female   Chief compliant   Chief Complaint   Patient presents with    Follow-up     Here for follow up on DM and has some concerns about VIT D level. Has Bg log with her from the last few days.  Would like a [facility] referral.        Subjective     History of present illness   In clinic for follow up on blood glucose readings   She also has concerns r/t vit d level and would like this checked today, hx of vit d def  She reports ongoing anxiety and depression r/t the recent murder of her son and the upcoming trial; she agrees to referral to psychiatry today, will place referral   She denies any SI/HI     Past Medical History: SLE, asthma, DM type 2, latent syphilis, myasthenia gravis, hyperlipidemia     Currently taking basaglar insulin 8 units daily, Lantus and tresiba were both declined by insurance  Last A1c 9.7, will be rechecked today   Checking blood glucose BID and prn  Blood glucose log : reviewed and will be scanned into records, three day am average 204; three day pm average 197  Glucometer: does not have in clinic   Will increase basaglar to 12 units daily     Seeing Dr Camacho for pain management     Hx of right eyelid ptosis and weakness of RUE and RLE, myasthenia gravis, SLE,  recently seen by [facility] neurology,  Dr Desir and ANDREA Rogers NP-C at  neurology [facility]  She has been receiving  IVIG infusions               Social History     Tobacco Use    Smoking status: Never     Passive exposure: Never    Smokeless tobacco: Current     Types: Snuff   Substance Use Topics    Alcohol use: Never    Drug use: Never       Review of patient's allergies indicates:   Allergen Reactions    Aspirin Hives       Past Medical History:   Diagnosis Date    Asthma     Diabetes mellitus     Late syphilis, 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 Added automatically  "from request for surgery [id number]    Systemic lupus erythematosus, organ or system involvement unspecified        Past Surgical History:   Procedure Laterality Date    DILATION AND CURETTAGE OF UTERUS  2000    preformed at [facility]    HYSTERECTOMY      MUSCLE BIOPSY      OOPHORECTOMY      pt has had one ovary removed, unsure side    TUBAL LIGATION  1999    [facility]        Family History   Problem Relation Name Age of Onset    Asthma Mother      Coronary artery disease Mother      Diabetes Father      Heart disease Father      Cancer Sister          Breast    Breast cancer Sister           Current Outpatient Medications:     albuterol (PROVENTIL/VENTOLIN HFA) 90 mcg/actuation inhaler, Inhale 2 puffs into the lungs every 6 (six) hours as needed for Wheezing. Rescue, Disp: 18 g, Rfl: 2    amitriptyline (ELAVIL) 25 MG tablet, Take 25 mg by mouth every evening., Disp: , Rfl:     fluticasone propionate (FLONASE) 50 mcg/actuation nasal spray, 1 spray (50 mcg total) by Each Nostril route once daily., Disp: 16 g, Rfl: 2    folic acid (FOLVITE) 1 MG tablet, Take 1 tablet by mouth once daily., Disp: , Rfl:     insulin glargine U-100, Lantus, (BASAGLAR KWIKPEN U-100 INSULIN) 100 unit/mL (3 mL) InPn pen, Inject 8 Units into the skin once daily., Disp: 7.2 mL, Rfl: 0    lovastatin (MEVACOR) 20 MG tablet, Take 1 tablet (20 mg total) by mouth once daily., Disp: 30 tablet, Rfl: 0    methotrexate 5 MG tablet, Take 12.5mg on Saturday and 12.5mg on Sunday max 25mg weekly, Disp: , Rfl:     PANZYGA 10 % Soln, Inject into the vein once a week., Disp: , Rfl:     pregabalin (LYRICA) 75 MG capsule, Take 75 mg by mouth 3 (three) times daily., Disp: , Rfl:     traZODone (DESYREL) 50 MG tablet, Take 1/2 to one tablet PO HS prn sleeep, Disp: 30 tablet, Rfl: 2    EASY TOUCH 31 gauge x 3/16" Ndle, USE one needle TO INJECT insulin ONCE DAILY as prescribed, Disp: , Rfl:     lancets (E-Z JECT LANCETS) 32 gauge Misc, 100 lancets by " "Misc.(Non-Drug; Combo Route) route. USE TO CHECK GLUCOSE ONCE DAILY, Disp: , Rfl:     pen needle, diabetic 32 gauge x 1/4" Ndle, Use one needle to inject insulin daily as prescribed, Disp: 100 each, Rfl: 5    TRUEPLUS PEN NEEDLE 32 gauge x 5/32" Ndle, SMARTSIG:injection Daily, Disp: , Rfl:     Review of Systems   Constitutional:  Positive for fatigue. Negative for appetite change, chills, fever and unexpected weight change.   Respiratory:  Negative for cough and shortness of breath.    Cardiovascular:  Negative for chest pain, palpitations and leg swelling.   Gastrointestinal:  Negative for abdominal pain, change in bowel habit, constipation, diarrhea, nausea and vomiting.   Genitourinary:  Negative for dysuria and frequency.   Musculoskeletal:  Positive for arthralgias and neck pain. Negative for gait problem and myalgias.   Neurological:  Negative for dizziness, syncope, light-headedness and headaches.   Psychiatric/Behavioral:  Positive for depressed mood and sleep disturbance. Negative for dysphoric mood, hallucinations and suicidal ideas. The patient is nervous/anxious.        Objective     /75   Pulse 92   Ht 5' 5" (1.651 m)   Wt 90.9 kg (200 lb 6.4 oz)   SpO2 100%   BMI 33.35 kg/m²     Physical Exam   Constitutional: She is oriented to person, place, and time. She is cooperative. No distress.   HENT:   Head: Atraumatic.   Mouth/Throat: Mucous membranes are moist.   Eyes: Conjunctivae are normal.   Right eye ptosis   Cardiovascular: Normal rate and regular rhythm. Pulmonary:      Effort: Pulmonary effort is normal. No respiratory distress.      Breath sounds: Normal breath sounds. No wheezing, rhonchi or rales.     Abdominal: Soft. Bowel sounds are normal. She exhibits no distension. There is no abdominal tenderness.   Musculoskeletal:         General: Normal range of motion.      Cervical back: Neck supple.      Right lower leg: No edema.      Left lower leg: No edema.   Neurological: She is alert " and oriented to person, place, and time. Gait normal.   Skin: Skin is warm and dry.   Psychiatric: Her speech is normal and behavior is normal. Thought content normal. Her mood appears anxious. Thought content is not paranoid. She expresses no homicidal and no suicidal ideation. She expresses no suicidal plans and no homicidal plans.       Lab Results   Component Value Date    WBC 5.17 02/16/2024    HGB 13.8 02/16/2024    HCT 40.9 02/16/2024    MCV 93.8 02/16/2024     02/16/2024       CMP  Sodium   Date Value Ref Range Status   02/16/2024 136 136 - 145 mmol/L Final     Potassium   Date Value Ref Range Status   02/16/2024 3.9 3.5 - 5.1 mmol/L Final     Chloride   Date Value Ref Range Status   02/16/2024 103 98 - 107 mmol/L Final     CO2   Date Value Ref Range Status   02/16/2024 24 21 - 32 mmol/L Final     Glucose   Date Value Ref Range Status   02/16/2024 197 (H) 74 - 106 mg/dL Final     BUN   Date Value Ref Range Status   02/16/2024 14 7 - 18 mg/dL Final     Creatinine   Date Value Ref Range Status   02/16/2024 0.93 0.55 - 1.02 mg/dL Final     Calcium   Date Value Ref Range Status   02/16/2024 8.7 8.5 - 10.1 mg/dL Final     Total Protein   Date Value Ref Range Status   02/16/2024 7.7 6.4 - 8.2 g/dL Final     Albumin   Date Value Ref Range Status   02/16/2024 3.2 (L) 3.5 - 5.0 g/dL Final     Bilirubin, Total   Date Value Ref Range Status   02/16/2024 0.4 >0.0 - 1.2 mg/dL Final     Alk Phos   Date Value Ref Range Status   02/16/2024 79 41 - 108 U/L Final     AST   Date Value Ref Range Status   02/16/2024 10 (L) 15 - 37 U/L Final     ALT   Date Value Ref Range Status   02/16/2024 18 13 - 56 U/L Final     Anion Gap   Date Value Ref Range Status   02/16/2024 13 7 - 16 mmol/L Final     eGFR    Date Value Ref Range Status   08/13/2021 67 >=60 mL/min/1.73m² Final     eGFR   Date Value Ref Range Status   04/13/2022 61 >=60 mL/min/1.73m² Final     Lab Results   Component Value Date    TSH 0.766  08/13/2021     Lab Results   Component Value Date    CHOL 216 (H) 07/11/2024    CHOL 232 (H) 06/12/2023    CHOL 216 (H) 04/13/2022     Lab Results   Component Value Date    HDL 51 07/11/2024    HDL 67 (H) 06/12/2023    HDL 58 04/13/2022     Lab Results   Component Value Date    LDLCALC 133 07/11/2024    LDLCALC 143 06/12/2023    LDLCALC 137 04/13/2022     Lab Results   Component Value Date    TRIG 162 (H) 07/11/2024    TRIG 112 06/12/2023    TRIG 106 04/13/2022     Lab Results   Component Value Date    CHOLHDL 4.2 07/11/2024    CHOLHDL 3.5 06/12/2023    CHOLHDL 3.7 04/13/2022     Lab Results   Component Value Date    HGBA1C 9.7 (H) 07/11/2024         Assessment and Plan   Type 2 diabetes mellitus without complication, without long-term current use of insulin  -     Vitamin B12; Future; Expected date: 08/26/2024    Depression, unspecified depression type  -     Ambulatory referral/consult to Psychiatry; Future; Expected date: 09/02/2024    Anxiety  -     Ambulatory referral/consult to Psychiatry; Future; Expected date: 09/02/2024    Hypertension, unspecified type    Systemic lupus erythematosus, unspecified SLE type, unspecified organ involvement status    Vitamin D deficiency  -     Vitamin D; Future; Expected date: 08/26/2024    High risk medication use  -     Vitamin B12; Future; Expected date: 08/26/2024    Fatigue, unspecified type  -     Vitamin D; Future; Expected date: 08/26/2024  -     Vitamin B12; Future; Expected date: 08/26/2024    Class 1 obesity due to excess calories with serious comorbidity and body mass index (BMI) of 33.0 to 33.9 in adult          Patient Instructions  Patient Instructions   Lab ordered in clinic today, we will notify you of results and any necessary changes to plan of care, no lab available in clinic today, you can go over to hospital for these to be obtained     Increase basaglar insulin to 12 units daily     Pt is advised to monitor and document glucose fasting when you wake up  before you eat and 2 hours after meal and bring bg log in to follow up visit in two weeks     Ensure to take medications as directed.    Follow diabetic diet as directed.    Work to achieve normal body weight.   Ensure to exercise 4-5 times per week for 20 minutes.     Referral place to psychiatry: [facility] in [place]

## 2024-08-27 DIAGNOSIS — E55.9 VITAMIN D DEFICIENCY: Primary | ICD-10-CM

## 2024-08-27 RX ORDER — ERGOCALCIFEROL 1.25 MG/1
50000 CAPSULE ORAL
Qty: 8 CAPSULE | Refills: 0 | Status: SHIPPED | OUTPATIENT
Start: 2024-08-27

## 2024-08-28 ENCOUNTER — HOSPITAL ENCOUNTER (OUTPATIENT)
Facility: HOSPITAL | Age: 49
Discharge: HOME | End: 2024-08-28
Attending: ANESTHESIOLOGY | Admitting: ANESTHESIOLOGY
Payer: COMMERCIAL

## 2024-08-28 VITALS
HEIGHT: 69 IN | SYSTOLIC BLOOD PRESSURE: 131 MMHG | RESPIRATION RATE: 20 BRPM | DIASTOLIC BLOOD PRESSURE: 83 MMHG | OXYGEN SATURATION: 100 % | BODY MASS INDEX: 29.71 KG/M2 | TEMPERATURE: 99 F | WEIGHT: 200.63 LBS | HEART RATE: 103 BPM

## 2024-08-28 DIAGNOSIS — M54.16 LUMBAR RADICULOPATHY, CHRONIC: ICD-10-CM

## 2024-08-28 LAB
GLUCOSE SERPL-MCNC: 293 MG/DL (ref 70–105)
GLUCOSE SERPL-MCNC: 299 MG/DL (ref 70–105)

## 2024-08-28 PROCEDURE — 82962 GLUCOSE BLOOD TEST: CPT

## 2024-08-28 PROCEDURE — 63600175 PHARM REV CODE 636 W HCPCS: Performed by: ANESTHESIOLOGY

## 2024-08-28 RX ORDER — SODIUM CHLORIDE 9 MG/ML
500 INJECTION, SOLUTION INTRAVENOUS CONTINUOUS
Status: DISCONTINUED | OUTPATIENT
Start: 2024-08-28 | End: 2024-08-28 | Stop reason: HOSPADM

## 2024-08-28 RX ADMIN — HUMAN INSULIN 8 UNITS: 100 INJECTION, SOLUTION SUBCUTANEOUS at 11:08

## 2024-08-28 NOTE — DISCHARGE SUMMARY
Patient did not have procedure. Elevated bloodsugar of 299 and was 293 after 8 units of Insulin. The pt will follow up in clinic. Discharged home. Discharge Dx: Cervical radiculopathy.

## 2024-08-28 NOTE — DISCHARGE INSTRUCTIONS
Patient BS taken and was 299. Per Deb Elam R 8 units given per NAHID Lynch RN. Recheck blood sugar was 293. Procedure canceled. AMBERLY Leblanc RN notified patient that there office will call her with a reschedule. Patient to follow up with PCP.
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
## 2024-09-10 DIAGNOSIS — I10 HYPERTENSION, UNSPECIFIED TYPE: Chronic | ICD-10-CM

## 2024-09-10 RX ORDER — LOVASTATIN 20 MG/1
20 TABLET ORAL DAILY
Qty: 90 TABLET | Refills: 0 | Status: SHIPPED | OUTPATIENT
Start: 2024-09-10

## 2024-09-20 ENCOUNTER — HOSPITAL ENCOUNTER (EMERGENCY)
Facility: HOSPITAL | Age: 49
Discharge: HOME | End: 2024-09-20
Payer: COMMERCIAL

## 2024-09-20 VITALS
HEIGHT: 69 IN | TEMPERATURE: 98 F | OXYGEN SATURATION: 100 % | SYSTOLIC BLOOD PRESSURE: 109 MMHG | BODY MASS INDEX: 29.62 KG/M2 | DIASTOLIC BLOOD PRESSURE: 71 MMHG | WEIGHT: 200 LBS | HEART RATE: 65 BPM | RESPIRATION RATE: 18 BRPM

## 2024-09-20 DIAGNOSIS — E11.65 TYPE 2 DIABETES MELLITUS WITH HYPERGLYCEMIA, WITH LONG-TERM CURRENT USE OF INSULIN: ICD-10-CM

## 2024-09-20 DIAGNOSIS — R73.9 HYPERGLYCEMIA: Primary | ICD-10-CM

## 2024-09-20 DIAGNOSIS — Z79.4 TYPE 2 DIABETES MELLITUS WITH HYPERGLYCEMIA, WITH LONG-TERM CURRENT USE OF INSULIN: ICD-10-CM

## 2024-09-20 LAB
BILIRUB UR QL STRIP: NEGATIVE
CLARITY UR: CLEAR
COLOR UR: ABNORMAL
GLUCOSE SERPL-MCNC: 309 MG/DL (ref 70–105)
GLUCOSE UR STRIP-MCNC: 500 MG/DL
KETONES UR STRIP-SCNC: NEGATIVE MG/DL
LEUKOCYTE ESTERASE UR QL STRIP: NEGATIVE
NITRITE UR QL STRIP: NEGATIVE
PH UR STRIP: 7 PH UNITS
PROT UR QL STRIP: NEGATIVE
RBC # UR STRIP: NEGATIVE /UL
SP GR UR STRIP: 1.02
UROBILINOGEN UR STRIP-ACNC: 0.2 MG/DL

## 2024-09-20 PROCEDURE — 81003 URINALYSIS AUTO W/O SCOPE: CPT

## 2024-09-20 PROCEDURE — 82962 GLUCOSE BLOOD TEST: CPT

## 2024-09-20 PROCEDURE — 99283 EMERGENCY DEPT VISIT LOW MDM: CPT | Mod: GF

## 2024-09-20 PROCEDURE — 99282 EMERGENCY DEPT VISIT SF MDM: CPT

## 2024-09-21 NOTE — DISCHARGE INSTRUCTIONS
Continue home medications as previously prescribed.  Attempt to follow a strict diabetic diet.  Continue hydration with ample water intake as directed.  Continue to monitor your blood sugars as recommended by your primary care provider.  Follow up with the primary care provider next week as scheduled.  Return to the emergency department for chest pain, shortness of breath, weakness, vomiting, severe abdominal pain, or any other new or worrisome symptoms.

## 2024-09-21 NOTE — ED PROVIDER NOTES
Encounter Date: 9/20/2024       History     Chief Complaint   Patient presents with    Hyperglycemia     Patient is a 49-year-old female who presents to the emergency department with concerns for hyperglycemia.  She reports that her blood sugar has been fluctuating all day and it was in the 300s tonight which prompted her to present for evaluation due to the concern of hyperglycemia.  She reports that she does have establish primary care who has recently been modifying her medication to attempt to gain better control of her blood sugar and she reports compliance with this medication.  She also reports that she was continuing to check her sugar twice daily and keep a log as directed.  Unfortunately, she does admit to falling the advice of a family member and recently eating a significant amount of fruit which could be a factor in her recent hyperglycemia.  She reports that the majority of the fruit ingestion was yesterday.  She denies any chest pain, shortness of breath, weakness, numbness, nausea, vomiting, abdominal pain, or any complaints at this time.  Her blood pressure is 109/71, temperature 98.2°, heart rate 65, respirations 18, and oxygen saturation 100% on room air.  She appears in no immediate distress.    The history is provided by the patient.     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 Added automatically from request for surgery [id number]    Systemic lupus erythematosus, organ or system involvement unspecified      Past Surgical History:   Procedure Laterality Date    DILATION AND CURETTAGE OF UTERUS  2000    preformed at [facility]    HYSTERECTOMY      MUSCLE BIOPSY      OOPHORECTOMY      pt has had one ovary removed, unsure side     TUBAL LIGATION  1999    [facility]     Family History   Problem Relation Name Age of Onset    Asthma Mother      Coronary artery disease Mother      Diabetes Father      Heart disease Father      Cancer Sister          Breast    Breast cancer Sister       Social History     Tobacco Use    Smoking status: Never     Passive exposure: Never    Smokeless tobacco: Current     Types: Snuff   Substance Use Topics    Alcohol use: Never    Drug use: Never     Review of Systems   Constitutional:  Negative for activity change, appetite change, chills and fever.   HENT:  Negative for congestion and sore throat.    Eyes: Negative.    Respiratory:  Negative for cough and shortness of breath.    Cardiovascular:  Negative for chest pain and palpitations.   Gastrointestinal:  Negative for abdominal pain, diarrhea, nausea and vomiting.   Endocrine: Positive for polyuria. Negative for polyphagia.   Genitourinary:  Negative for difficulty urinating and dysuria.   Musculoskeletal:  Negative for arthralgias, back pain, neck pain and neck stiffness.   Skin:  Negative for color change, pallor and rash.   Allergic/Immunologic: Negative.    Neurological:  Negative for dizziness, weakness, light-headedness and headaches.   Hematological:  Does not bruise/bleed easily.   Psychiatric/Behavioral:  Negative for confusion. The patient is not nervous/anxious.    All other systems reviewed and are negative.      Physical Exam     Initial Vitals [09/20/24 2058]   BP Pulse Resp Temp SpO2   (!) 142/93 74 18 98.2 °F (36.8 °C) 100 %      MAP       --         Physical Exam    Nursing note and vitals reviewed.  Constitutional: Vital signs are normal. She appears well-developed and well-nourished. She is not diaphoretic. She is Obese . She is active and cooperative.  Non-toxic appearance. No distress.   HENT:   Head: Normocephalic and atraumatic.   Right Ear: External ear normal.   Left Ear: External ear normal.   Nose: Nose normal.   Mouth/Throat:  Oropharynx is clear and moist.   Eyes: Conjunctivae and EOM are normal. Pupils are equal, round, and reactive to light.   Neck: Neck supple.   Normal range of motion.   Full passive range of motion without pain.     Cardiovascular:  Normal rate, regular rhythm, S1 normal, S2 normal, normal heart sounds and normal pulses.           Pulmonary/Chest: Effort normal and breath sounds normal. No tachypnea. No respiratory distress. She has no wheezes. She has no rhonchi. She has no rales. She exhibits no tenderness.   Abdominal: Abdomen is soft. Bowel sounds are normal. She exhibits no distension. There is no abdominal tenderness. There is no rebound and no guarding.   Musculoskeletal:         General: Normal range of motion.      Cervical back: Full passive range of motion without pain, normal range of motion and neck supple.     Neurological: She is alert and oriented to person, place, and time. She has normal strength. GCS score is 15. GCS eye subscore is 4. GCS verbal subscore is 5. GCS motor subscore is 6.   Skin: Skin is warm and dry. Capillary refill takes less than 2 seconds.   Psychiatric: She has a normal mood and affect. Her behavior is normal. Judgment and thought content normal.         Medical Screening Exam   See Full Note    ED Course   Procedures  Labs Reviewed   URINALYSIS, REFLEX TO URINE CULTURE - Abnormal       Result Value    Color, UA Light Yellow      Clarity, UA Clear      pH, UA 7.0      Leukocytes, UA Negative      Nitrites, UA Negative      Protein, UA Negative      Glucose,  (*)     Ketones, UA Negative      Urobilinogen, UA 0.2      Bilirubin, UA Negative      Blood, UA Negative      Specific Gravity, UA 1.020     POCT GLUCOSE MONITORING CONTINUOUS - Abnormal    POC Glucose 309 (*)           Imaging Results    None          Medications - No data to display  Medical Decision Making  Presents with concerns for hyperglycemia.  She was alert and oriented x4.  GCS 15.  Vital signs stable.   Currently afebrile with a temperature 98.2°.  Nontoxic in appearance.  She denies any complaints at this time.  Expresses a concern for her recently higher than normal blood sugar at home.  We did have an in-depth discussion regarding her diabetes, recent meal intake, blood sugar monitoring, and compliance with her medications.  She reports that a family member told her that she needed to eat more fruit and that she indulged and copious amounts of fruit over the past 1-2 days.  This has likely the culprit of her recent hyperglycemia.  She was nontoxic in appearance at current.  She has no complaints.  We did perform a urinalysis and a point of care glucose which was noted to be 309.  Urinalysis showed 500 glucose but otherwise unremarkable.  She has been compliant with her medications and we spent roughly 15 minutes discussing diabetes in-depth as well as the importance of meal planning and following a strict diabetic diet.  We do not feel that further labs or workup would be necessary at this time given her lack of complaint and unremarkable physical exam.  We did discuss the importance of maintaining close contact with her primary care until her sugar is regulated and she reports that she does have a follow up already scheduled for this coming week.  We will provide her with this many resources as we can regarding diabetes management, meal planning, blood pressure log, etc..  We also provided strict ED return precautions.  We will provide written instructions as well.  All questions were answered.  She verbalizes understanding and is in agreement with this plan.    Amount and/or Complexity of Data Reviewed  Independent Historian:      Details: Patient is a 49-year-old female who presents to the emergency department with concerns for hyperglycemia.  She reports that her blood sugar has been fluctuating all day and it was in the 300s tonight which prompted her to present for evaluation due to the concern of  hyperglycemia.  She reports that she does have establish primary care who has recently been modifying her medication to attempt to gain better control of her blood sugar and she reports compliance with this medication.  She also reports that she was continuing to check her sugar twice daily and keep a log as directed.  Unfortunately, she does admit to falling the advice of a family member and recently eating a significant amount of fruit which could be a factor in her recent hyperglycemia.  She reports that the majority of the fruit ingestion was yesterday.  She denies any chest pain, shortness of breath, weakness, numbness, nausea, vomiting, abdominal pain, or any complaints at this time.  Her blood pressure is 109/71, temperature 98.2°, heart rate 65, respirations 18, and oxygen saturation 100% on room air.  She appears in no immediate distress.  Labs: ordered.     Details: Urinalysis shows 500 glucose but otherwise unremarkable.  Point of care glucose 309.     Risk  Risk Details: Patient presents for emergent evaluation of acute hyperglycemia that poses a threat to life and/or bodily function.    Final diagnoses:  [R73.9] Hyperglycemia (Primary)  [E11.65, Z79.4] Type 2 diabetes mellitus with hyperglycemia, with long-term current use of insulin  I ordered labs and personally reviewed them.  Labs significant for glucose of 309 and urinalysis with 500 of glucose noted.  Diagnosis, home care, follow up, and strict ED return precautions were explained in detail to the patient and her family.  All questions were answered.  They verbalized understanding and agreement with this plan.  Patient was managed in the ED with physical exam, diabetic education, home care recommendations, and strict ED return precautions.    The response to treatment was improved.    Patient was discharged in stable condition.  Detailed return precautions discussed.                                       Clinical Impression:   Final  diagnoses:  [R73.9] Hyperglycemia (Primary)  [E11.65, Z79.4] Type 2 diabetes mellitus with hyperglycemia, with long-term current use of insulin        ED Disposition Condition    Discharge Stable          ED Prescriptions    None       Follow-up Information       Follow up With Specialties Details Why Contact Info    Elizabeth Schmidt, MEENA Family Medicine Go to  Follow up next week as scheduled. [address] [place] [address]  [facility] [place] [address]  [phone]               Micheal Salmon, MEENA  09/20/24 2314       Micheal Salmon, MEENA  09/20/24 2312

## 2024-09-28 ENCOUNTER — HOSPITAL ENCOUNTER (EMERGENCY)
Facility: HOSPITAL | Age: 49
Discharge: HOME | End: 2024-09-28
Attending: EMERGENCY MEDICINE
Payer: COMMERCIAL

## 2024-09-28 VITALS
SYSTOLIC BLOOD PRESSURE: 106 MMHG | BODY MASS INDEX: 29.62 KG/M2 | TEMPERATURE: 98 F | RESPIRATION RATE: 18 BRPM | DIASTOLIC BLOOD PRESSURE: 64 MMHG | HEART RATE: 70 BPM | HEIGHT: 69 IN | OXYGEN SATURATION: 98 % | WEIGHT: 200 LBS

## 2024-09-28 DIAGNOSIS — J45.909 ASTHMA, UNSPECIFIED ASTHMA SEVERITY, UNSPECIFIED WHETHER COMPLICATED, UNSPECIFIED WHETHER PERSISTENT: Chronic | ICD-10-CM

## 2024-09-28 DIAGNOSIS — N76.0 ACUTE VAGINITIS: Primary | ICD-10-CM

## 2024-09-28 LAB
ALBUMIN SERPL BCP-MCNC: 3 G/DL (ref 3.5–5)
ALBUMIN/GLOB SERPL: 0.7 {RATIO}
ALP SERPL-CCNC: 99 U/L (ref 41–108)
ALT SERPL W P-5'-P-CCNC: 14 U/L (ref 13–56)
ANION GAP SERPL CALCULATED.3IONS-SCNC: 10 MMOL/L (ref 7–16)
AST SERPL W P-5'-P-CCNC: 11 U/L (ref 15–37)
BACTERIA #/AREA URNS HPF: ABNORMAL /HPF
BASOPHILS # BLD AUTO: 0.03 K/UL (ref 0–0.2)
BASOPHILS NFR BLD AUTO: 0.7 % (ref 0–1)
BILIRUB SERPL-MCNC: 0.3 MG/DL (ref ?–1.2)
BILIRUB UR QL STRIP: NEGATIVE
BUN SERPL-MCNC: 11 MG/DL (ref 7–18)
BUN/CREAT SERPL: 11 (ref 6–20)
CALCIUM SERPL-MCNC: 8.8 MG/DL (ref 8.5–10.1)
CHLORIDE SERPL-SCNC: 104 MMOL/L (ref 98–107)
CLARITY UR: CLEAR
CO2 SERPL-SCNC: 25 MMOL/L (ref 21–32)
COLOR UR: COLORLESS
CREAT SERPL-MCNC: 0.97 MG/DL (ref 0.55–1.02)
DIFFERENTIAL METHOD BLD: ABNORMAL
EGFR (NO RACE VARIABLE) (RUSH/TITUS): 71 ML/MIN/1.73M2
EOSINOPHIL # BLD AUTO: 0.07 K/UL (ref 0–0.5)
EOSINOPHIL NFR BLD AUTO: 1.5 % (ref 1–4)
ERYTHROCYTE [DISTWIDTH] IN BLOOD BY AUTOMATED COUNT: 11.9 % (ref 11.5–14.5)
GLOBULIN SER-MCNC: 4.5 G/DL (ref 2–4)
GLUCOSE SERPL-MCNC: 323 MG/DL (ref 74–106)
GLUCOSE UR STRIP-MCNC: >1000 MG/DL
HCG SERPL-ACNC: <1 MIU/ML
HCT VFR BLD AUTO: 38.6 % (ref 38–47)
HGB BLD-MCNC: 12.8 G/DL (ref 12–16)
IMM GRANULOCYTES # BLD AUTO: 0.02 K/UL (ref 0–0.04)
IMM GRANULOCYTES NFR BLD: 0.4 % (ref 0–0.4)
KETONES UR STRIP-SCNC: NEGATIVE MG/DL
LEUKOCYTE ESTERASE UR QL STRIP: ABNORMAL
LYMPHOCYTES # BLD AUTO: 1.51 K/UL (ref 1–4.8)
LYMPHOCYTES NFR BLD AUTO: 32.8 % (ref 27–41)
MCH RBC QN AUTO: 31 PG (ref 27–31)
MCHC RBC AUTO-ENTMCNC: 33.2 G/DL (ref 32–36)
MCV RBC AUTO: 93.5 FL (ref 80–96)
MONOCYTES # BLD AUTO: 0.54 K/UL (ref 0–0.8)
MONOCYTES NFR BLD AUTO: 11.7 % (ref 2–6)
MPC BLD CALC-MCNC: 11.1 FL (ref 9.4–12.4)
MUCOUS, UA: ABNORMAL /LPF
NEUTROPHILS # BLD AUTO: 2.43 K/UL (ref 1.8–7.7)
NEUTROPHILS NFR BLD AUTO: 52.9 % (ref 53–65)
NITRITE UR QL STRIP: NEGATIVE
NRBC # BLD AUTO: 0 X10E3/UL
NRBC, AUTO (.00): 0 %
PH UR STRIP: 6 PH UNITS
PLATELET # BLD AUTO: 235 K/UL (ref 150–400)
POTASSIUM SERPL-SCNC: 3.8 MMOL/L (ref 3.5–5.1)
PROT SERPL-MCNC: 7.5 G/DL (ref 6.4–8.2)
PROT UR QL STRIP: 10
RBC # BLD AUTO: 4.13 M/UL (ref 4.2–5.4)
RBC # UR STRIP: ABNORMAL /UL
RBC #/AREA URNS HPF: 6 /HPF
RH BLD: NORMAL
SODIUM SERPL-SCNC: 135 MMOL/L (ref 136–145)
SP GR UR STRIP: 1.03
SQUAMOUS #/AREA URNS LPF: ABNORMAL /HPF
UROBILINOGEN UR STRIP-ACNC: 2 MG/DL
WBC # BLD AUTO: 4.6 K/UL (ref 4.5–11)
WBC #/AREA URNS HPF: 13 /HPF

## 2024-09-28 PROCEDURE — 87086 URINE CULTURE/COLONY COUNT: CPT | Performed by: EMERGENCY MEDICINE

## 2024-09-28 PROCEDURE — 87077 CULTURE AEROBIC IDENTIFY: CPT | Performed by: EMERGENCY MEDICINE

## 2024-09-28 PROCEDURE — 99285 EMERGENCY DEPT VISIT HI MDM: CPT | Mod: 25

## 2024-09-28 PROCEDURE — 85025 COMPLETE CBC W/AUTO DIFF WBC: CPT | Performed by: EMERGENCY MEDICINE

## 2024-09-28 PROCEDURE — 84702 CHORIONIC GONADOTROPIN TEST: CPT | Performed by: EMERGENCY MEDICINE

## 2024-09-28 PROCEDURE — 81003 URINALYSIS AUTO W/O SCOPE: CPT | Performed by: EMERGENCY MEDICINE

## 2024-09-28 PROCEDURE — 86900 BLOOD TYPING SEROLOGIC ABO: CPT | Performed by: EMERGENCY MEDICINE

## 2024-09-28 PROCEDURE — 96360 HYDRATION IV INFUSION INIT: CPT

## 2024-09-28 PROCEDURE — 80053 COMPREHEN METABOLIC PANEL: CPT | Performed by: EMERGENCY MEDICINE

## 2024-09-28 PROCEDURE — 36415 COLL VENOUS BLD VENIPUNCTURE: CPT | Performed by: EMERGENCY MEDICINE

## 2024-09-28 PROCEDURE — 25500020 PHARM REV CODE 255: Performed by: FAMILY MEDICINE

## 2024-09-28 PROCEDURE — 86901 BLOOD TYPING SEROLOGIC RH(D): CPT | Performed by: EMERGENCY MEDICINE

## 2024-09-28 PROCEDURE — 25000003 PHARM REV CODE 250: Performed by: EMERGENCY MEDICINE

## 2024-09-28 RX ORDER — ALBUTEROL SULFATE 90 UG/1
2 INHALANT RESPIRATORY (INHALATION) EVERY 6 HOURS PRN
Qty: 18 G | Refills: 2 | Status: SHIPPED | OUTPATIENT
Start: 2024-09-28

## 2024-09-28 RX ORDER — METRONIDAZOLE 500 MG/1
500 TABLET ORAL 3 TIMES DAILY
Qty: 21 TABLET | Refills: 0 | Status: SHIPPED | OUTPATIENT
Start: 2024-09-28 | End: 2024-10-05

## 2024-09-28 RX ORDER — IOPAMIDOL 755 MG/ML
100 INJECTION, SOLUTION INTRAVASCULAR
Status: COMPLETED | OUTPATIENT
Start: 2024-09-28 | End: 2024-09-28

## 2024-09-28 RX ORDER — TIZANIDINE 4 MG/1
4 TABLET ORAL EVERY 6 HOURS PRN
Qty: 30 TABLET | Refills: 0 | Status: SHIPPED | OUTPATIENT
Start: 2024-09-28 | End: 2024-10-28

## 2024-09-28 RX ORDER — DOXYCYCLINE 100 MG/1
100 CAPSULE ORAL 2 TIMES DAILY
Qty: 20 CAPSULE | Refills: 0 | Status: SHIPPED | OUTPATIENT
Start: 2024-09-28 | End: 2024-10-08

## 2024-09-28 RX ORDER — FLUCONAZOLE 200 MG/1
200 TABLET ORAL DAILY
Qty: 2 TABLET | Refills: 0 | Status: SHIPPED | OUTPATIENT
Start: 2024-09-28 | End: 2024-09-30

## 2024-09-28 RX ADMIN — IOPAMIDOL 100 ML: 755 INJECTION, SOLUTION INTRAVENOUS at 08:09

## 2024-09-28 RX ADMIN — SODIUM CHLORIDE 1000 ML: 9 INJECTION, SOLUTION INTRAVENOUS at 04:09

## 2024-09-28 NOTE — ED TRIAGE NOTES
States  modest amount of vaginal bleeding starting 6 hours piror to arrival with left lower quadrant flank pain. Pmh of partial hysterectomy.

## 2024-09-28 NOTE — ED PROVIDER NOTES
Encounter Date: 9/28/2024       History     Chief Complaint   Patient presents with    Vaginal Bleeding     A 49-year-old female patient with history of hysterectomy; came complaining of vaginal bleeding.  The patient states initially she wiped and she sews some bleeding after going to the bathroom.  The bleeding continues without any pain.  There is no back pain.  There is no low abdominal pain.  The patient denies fever or chills.  There is no nausea or vomiting.    The history is provided by the patient. No  was used.     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 Added automatically from request for surgery [id number]    Systemic lupus erythematosus, organ or system involvement unspecified      Past Surgical History:   Procedure Laterality Date    DILATION AND CURETTAGE OF UTERUS  2000    preformed at [facility]    HYSTERECTOMY      MUSCLE BIOPSY      OOPHORECTOMY      pt has had one ovary removed, unsure side    TUBAL LIGATION  1999    [facility]     Family History   Problem Relation Name Age of Onset    Asthma Mother      Coronary artery disease Mother      Diabetes Father      Heart disease Father      Cancer Sister          Breast    Breast cancer Sister       Social History     Tobacco Use    Smoking status: Never     Passive exposure: Never    Smokeless tobacco: Current     Types: Snuff   Substance Use Topics    Alcohol use: Never    Drug use: Never     Review of Systems   Constitutional:  Negative for fever.   HENT:  Negative for sore throat.    Respiratory:  Negative for shortness of breath.    Cardiovascular:  Negative for chest pain.   Gastrointestinal:  Negative for nausea.   Genitourinary:  Positive for vaginal  bleeding. Negative for dysuria.   Musculoskeletal:  Negative for back pain.   Skin:  Negative for rash.   Neurological:  Negative for weakness.   Hematological:  Does not bruise/bleed easily.       Physical Exam     Initial Vitals [09/28/24 0341]   BP Pulse Resp Temp SpO2   (!) 137/90 78 18 98.1 °F (36.7 °C) 98 %      MAP       --         Physical Exam    Nursing note and vitals reviewed.  Constitutional: She appears well-developed and well-nourished.   HENT:   Head: Normocephalic and atraumatic.   Eyes: EOM are normal. Pupils are equal, round, and reactive to light.   Neck: Neck supple.   Normal range of motion.  Cardiovascular:  Normal rate, regular rhythm, normal heart sounds and intact distal pulses.           No murmur heard.  Pulmonary/Chest: Breath sounds normal. She has no wheezes.   Abdominal: Abdomen is soft. Bowel sounds are normal. There is no abdominal tenderness.   Musculoskeletal:         General: No tenderness or edema. Normal range of motion.      Cervical back: Normal range of motion and neck supple.     Neurological: She is alert and oriented to person, place, and time. She has normal strength and normal reflexes. GCS score is 15. GCS eye subscore is 4. GCS verbal subscore is 5. GCS motor subscore is 6.   Skin: Skin is warm and dry. Capillary refill takes less than 2 seconds.   Psychiatric: She has a normal mood and affect.         Medical Screening Exam   See Full Note    ED Course   Procedures  Labs Reviewed   URINALYSIS, REFLEX TO URINE CULTURE - Abnormal       Result Value    Color, UA Colorless      Clarity, UA Clear      pH, UA 6.0      Leukocytes, UA Moderate (*)     Nitrites, UA Negative      Protein, UA 10 (*)     Glucose, UA >1000 (*)     Ketones, UA Negative      Urobilinogen, UA 2 (*)     Bilirubin, UA Negative      Blood, UA Large (*)     Specific Gravity, UA 1.035 (*)    URINALYSIS, MICROSCOPIC - Abnormal    WBC, UA 13 (*)     RBC, UA 6 (*)     Bacteria, UA Occasional (*)      Squamous Epithelial Cells, UA Occasional (*)     Mucous Occasional (*)    COMPREHENSIVE METABOLIC PANEL - Abnormal    Sodium 135 (*)     Potassium 3.8      Chloride 104      CO2 25      Anion Gap 10      Glucose 323 (*)     BUN 11      Creatinine 0.97      BUN/Creatinine Ratio 11      Calcium 8.8      Total Protein 7.5      Albumin 3.0 (*)     Globulin 4.5 (*)     A/G Ratio 0.7      Bilirubin, Total 0.3      Alk Phos 99      ALT 14      AST 11 (*)     eGFR 71     CBC WITH DIFFERENTIAL - Abnormal    WBC 4.60      RBC 4.13 (*)     Hemoglobin 12.8      Hematocrit 38.6      MCV 93.5      MCH 31.0      MCHC 33.2      RDW 11.9      Platelet Count 235      MPV 11.1      Neutrophils % 52.9 (*)     Lymphocytes % 32.8      Monocytes % 11.7 (*)     Eosinophils % 1.5      Basophils % 0.7      Immature Granulocytes % 0.4      nRBC, Auto 0.0      Neutrophils, Abs 2.43      Lymphocytes, Absolute 1.51      Monocytes, Absolute 0.54      Eosinophils, Absolute 0.07      Basophils, Absolute 0.03      Immature Granulocytes, Absolute 0.02      nRBC, Absolute 0.00      Diff Type Auto     CULTURE, URINE    Culture, Urine Culture In Progress     CBC W/ AUTO DIFFERENTIAL    Narrative:     The following orders were created for panel order CBC W/ AUTO DIFFERENTIAL.  Procedure                               Abnormality         Status                     ---------                               -----------         ------                     CBC with Differential[[id number]]       Abnormal            Final result                 Please view results for these tests on the individual orders.   HCG, TOTAL, QUANTITATIVE    HCG Quantitative <1     GROUP & RH    Group & Rh O POS            Imaging Results              CT Abdomen Pelvis With IV Contrast NO Oral Contrast (Final result)  Result time 09/28/24 08:40:28      Final result by Collette Dubose MD (09/28/24 08:40:28)                   Impression:      There are no CT findings to explain this  patient's acute abdominal pain.      Electronically signed by: Collette Dubose MD  Date:    09/28/2024  Time:    08:40               Narrative:    EXAMINATION:  CT ABDOMEN PELVIS WITH IV CONTRAST    CLINICAL HISTORY:  Abdominal pain, acute, nonlocalized;    TECHNIQUE:  Low dose axial images, sagittal and coronal reformations were obtained from the lung bases to the pubic symphysis following the IV administration of 100 mL of Omnipaque 350 and the oral administration of 30 mL of Omnipaque 350.    COMPARISON:  05/25/2019    FINDINGS:  There is atelectasis at bilateral lung bases.    The liver, spleen, adrenal glands, pancreas and right kidney are unremarkable.  There is a subcentimeter low-attenuation lesion within the anterior aspect of the left kidney likely representing cysts but technically too small to characterize.  There is no evidence hydronephrosis.    There is no evidence bowel obstruction.  The gallbladder is unremarkable.  Stool is seen throughout the colon.  The appendix is within normal limits.  There is no evidence of abdominal nor pelvic lymphadenopathy.  The osseous structures are unremarkable.    Delayed images are without filling defect in opacified portions of the collecting systems.  The ureters are not completely opacified.                                       Medications   sodium chloride 0.9% bolus 1,000 mL 1,000 mL (0 mLs Intravenous Stopped 9/28/24 0542)   iopamidoL (ISOVUE-370) injection 100 mL (100 mLs Intravenous Given 9/28/24 0808)     Medical Decision Making                        Medical Decision Making:   Initial Assessment:   A 49-year-old female patient with history of hysterectomy; came complaining of vaginal bleeding.  The patient states initially she wiped and she sews some bleeding after going to the bathroom.  The bleeding continues without any pain.  There is no back pain.  There is no low abdominal pain.  The patient denies fever or chills.  There is no nausea or  vomiting.    The history is provided by the patient. No  was used.     Differential Diagnosis:   On vaginal evaluation there is no bleeding noted.  And there is no masses or lesions within the vaginal vault.  Using a speculum.  There was some yellowish discharge.  Will place the patient on azithromycin and Flagyl patient will also be placed on doxycycline and Diflucan and Naprosyn             Clinical Impression:   Final diagnoses:  [N76.0] Acute vaginitis (Primary)        ED Disposition Condition    Discharge           ED Prescriptions       Medication Sig Dispense Start Date End Date Auth. Provider    albuterol (PROVENTIL/VENTOLIN HFA) 90 mcg/actuation inhaler Inhale 2 puffs into the lungs every 6 (six) hours as needed for Wheezing. Rescue 18 g 9/28/2024 -- Chris Recinos,     tiZANidine (ZANAFLEX) 4 MG tablet Take 1 tablet (4 mg total) by mouth every 6 (six) hours as needed. 30 tablet 9/28/2024 10/28/2024 Chris Recinos, DO    doxycycline (VIBRAMYCIN) 100 MG Cap Take 1 capsule (100 mg total) by mouth 2 (two) times daily. for 10 days 20 capsule 9/28/2024 10/8/2024 Chris Recinos, DO    metroNIDAZOLE (FLAGYL) 500 MG tablet Take 1 tablet (500 mg total) by mouth 3 (three) times daily. for 7 days 21 tablet 9/28/2024 10/5/2024 Chris Recinos, DO    fluconazole (DIFLUCAN) 200 MG Tab (Expires today) Take 1 tablet (200 mg total) by mouth once daily. for 2 days 2 tablet 9/28/2024 9/30/2024 Chris Recinos DO          Follow-up Information    None          Shahab Cadet MD  09/30/24 5843

## 2024-10-04 LAB — UA COMPLETE W REFLEX CULTURE PNL UR: ABNORMAL

## 2024-10-15 ENCOUNTER — PATIENT OUTREACH (OUTPATIENT)
Facility: HOSPITAL | Age: 49
End: 2024-10-15
Payer: COMMERCIAL

## 2024-10-15 NOTE — PROGRESS NOTES
Population Health Chart Review & Patient Outreach Details    Updates Requested / Reviewed:  [x]  Care Team Updated  [x]  Care Everywhere Updated & Reviewed  [x]  [facility] Reviewed  []   Reviewed  []  [facility] Reviewed    Health Maintenance Topics Addressed and Outreach Outcomes / Actions Taken:           HbA1c & Other Labs []  Overdue Lab(s) Ordered    []  Overdue Lab(s) Scheduled    []  External Records Uploaded & Care Team Updated if Applicable    [x]  Primary Care Follow Up Visit Scheduled     []  Reminded Patient to Complete A1c Home Test    []  Patient Declined Scheduling Labs or Will Call Back to Schedule    []  Patient Will Schedule with External Provider / Order Routed, & Care Team Updated if Applicable             Primary Care Appointment [x]  Primary Care Appt Scheduled    []  Patient Declined Scheduling or Will Call Back to Schedule    []  Pt Established with External Provider, Updated Care Team, & Informed Pt to Notify [employer] if Applicable   **Telephone outreach, pt scheduled for 10/21/2024 at 0815 for follow up with PCP**

## 2024-10-21 ENCOUNTER — OFFICE VISIT (OUTPATIENT)
Dept: FAMILY MEDICINE | Facility: CLINIC | Age: 49
End: 2024-10-21
Payer: COMMERCIAL

## 2024-10-21 VITALS
SYSTOLIC BLOOD PRESSURE: 131 MMHG | DIASTOLIC BLOOD PRESSURE: 81 MMHG | HEIGHT: 69 IN | WEIGHT: 195.38 LBS | OXYGEN SATURATION: 99 % | BODY MASS INDEX: 28.94 KG/M2 | TEMPERATURE: 98 F | HEART RATE: 72 BPM

## 2024-10-21 DIAGNOSIS — E11.9 TYPE 2 DIABETES MELLITUS WITHOUT COMPLICATION, WITHOUT LONG-TERM CURRENT USE OF INSULIN: Primary | Chronic | ICD-10-CM

## 2024-10-21 DIAGNOSIS — F41.9 ANXIETY: ICD-10-CM

## 2024-10-21 DIAGNOSIS — I10 HYPERTENSION, UNSPECIFIED TYPE: Chronic | ICD-10-CM

## 2024-10-21 DIAGNOSIS — E11.9 TYPE 2 DIABETES MELLITUS WITHOUT COMPLICATION, WITHOUT LONG-TERM CURRENT USE OF INSULIN: Chronic | ICD-10-CM

## 2024-10-21 DIAGNOSIS — E66.3 OVERWEIGHT (BMI 25.0-29.9): ICD-10-CM

## 2024-10-21 DIAGNOSIS — F43.21 GRIEVING: ICD-10-CM

## 2024-10-21 DIAGNOSIS — F32.A DEPRESSION, UNSPECIFIED DEPRESSION TYPE: ICD-10-CM

## 2024-10-21 DIAGNOSIS — M32.9 SYSTEMIC LUPUS ERYTHEMATOSUS, UNSPECIFIED SLE TYPE, UNSPECIFIED ORGAN INVOLVEMENT STATUS: Chronic | ICD-10-CM

## 2024-10-21 LAB
EST. AVERAGE GLUCOSE BLD GHB EST-MCNC: 332 MG/DL
HBA1C MFR BLD HPLC: 13.2 % (ref 4.5–6.6)

## 2024-10-21 PROCEDURE — 3075F SYST BP GE 130 - 139MM HG: CPT | Mod: ,,, | Performed by: NURSE PRACTITIONER

## 2024-10-21 PROCEDURE — 99212 OFFICE O/P EST SF 10 MIN: CPT | Mod: ,,, | Performed by: NURSE PRACTITIONER

## 2024-10-21 PROCEDURE — 3061F NEG MICROALBUMINURIA REV: CPT | Mod: ,,, | Performed by: NURSE PRACTITIONER

## 2024-10-21 PROCEDURE — 3066F NEPHROPATHY DOC TX: CPT | Mod: ,,, | Performed by: NURSE PRACTITIONER

## 2024-10-21 PROCEDURE — 83036 HEMOGLOBIN GLYCOSYLATED A1C: CPT | Mod: ,,, | Performed by: CLINICAL MEDICAL LABORATORY

## 2024-10-21 PROCEDURE — 1160F RVW MEDS BY RX/DR IN RCRD: CPT | Mod: ,,, | Performed by: NURSE PRACTITIONER

## 2024-10-21 PROCEDURE — 3079F DIAST BP 80-89 MM HG: CPT | Mod: ,,, | Performed by: NURSE PRACTITIONER

## 2024-10-21 PROCEDURE — 3008F BODY MASS INDEX DOCD: CPT | Mod: ,,, | Performed by: NURSE PRACTITIONER

## 2024-10-21 PROCEDURE — 3046F HEMOGLOBIN A1C LEVEL >9.0%: CPT | Mod: ,,, | Performed by: NURSE PRACTITIONER

## 2024-10-21 PROCEDURE — 1159F MED LIST DOCD IN RCRD: CPT | Mod: ,,, | Performed by: NURSE PRACTITIONER

## 2024-10-21 RX ORDER — VENLAFAXINE HYDROCHLORIDE 37.5 MG/1
75 TABLET, EXTENDED RELEASE ORAL DAILY
COMMUNITY
Start: 2024-09-29

## 2024-10-21 NOTE — PATIENT INSTRUCTIONS
Lab obtained in clinic today, we will notify you of results and any necessary changes to plan of care     Continue basaglar insulin 12 units daily for now, we will let you know if this needs to be changed when we call about your A1c results     Pt is advised to monitor and document glucose fasting when you wake up before you eat and 2 hours after meal and bring bg log in to all clinic visits, this allows for medications to be adjusted if needed and it will also allow insurance to continue to cover your testing supplies     Ensure to take medications as directed.    Follow diabetic diet as directed.    Work to achieve normal body weight.   Ensure to exercise 4-5 times per week for 20 minutes.     Schedule routine follow up on 12/3/2024, medication will be due again on 12/9/24

## 2024-10-21 NOTE — PROGRESS NOTES
Clinic note     Patient name: Tequila Vázquez is a 49 y.o. female   Chief compliant   Chief Complaint   Patient presents with    Follow-up     Here for follow up with DM. States she is feeling better and seeing her therapist. Accu checks avg [phone]       Subjective     History of present illness   In clinic for follow up on DM    Past Medical History: SLE, asthma, DM type 2, latent syphilis, myasthenia gravis, hyperlipidemia     Last seen in clinic on 8/26/24, basaglar insulin was increased to 12 units daily, she was instructed to keep bg log and follow up in clinic in two weeks, she did not keep follow up as recommended;   Referral was placed during that visit to psychiatry, appointment was scheduled for 9/24/24 with Neetu GEORGESKYRIE; Ms Vázquez reports she is seeing Ms Arriaza routinely and is beginning to feel some improvement; denies any SI/HI       Last A1c 9.7, will be rechecked today   Checking blood glucose BID and prn  Blood glucose log : not in clinic, reports fasting readings [phone]  Glucometer: does not have in clinic      Seeing Dr Camacho for pain management     Hx of right eyelid ptosis and weakness of RUE and RLE, myasthenia gravis, SLE,  recently seen by [facility] neurology,  Dr Desir and ANDREA Rogers NP-C at  neurology [facility]  She has been receiving  IVIG infusions  Follow up at [facility] scheduled for December              Social History     Tobacco Use    Smoking status: Never     Passive exposure: Never    Smokeless tobacco: Current     Types: Snuff   Substance Use Topics    Alcohol use: Never    Drug use: Never       Review of patient's allergies indicates:   Allergen Reactions    Aspirin Hives       Past Medical History:   Diagnosis Date    Asthma     Diabetes mellitus     Late syphilis, 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 Added automatically from request for surgery [id number]    Systemic lupus  "erythematosus, organ or system involvement unspecified        Past Surgical History:   Procedure Laterality Date    DILATION AND CURETTAGE OF UTERUS  2000    preformed at [facility]    HYSTERECTOMY      MUSCLE BIOPSY      OOPHORECTOMY      pt has had one ovary removed, unsure side    TUBAL LIGATION  1999    [facility]        Family History   Problem Relation Name Age of Onset    Asthma Mother      Coronary artery disease Mother      Diabetes Father      Heart disease Father      Cancer Sister          Breast    Breast cancer Sister           Current Outpatient Medications:     albuterol (PROVENTIL/VENTOLIN HFA) 90 mcg/actuation inhaler, Inhale 2 puffs into the lungs every 6 (six) hours as needed for Wheezing. Rescue, Disp: 18 g, Rfl: 2    fluticasone propionate (FLONASE) 50 mcg/actuation nasal spray, 1 spray (50 mcg total) by Each Nostril route once daily., Disp: 16 g, Rfl: 2    lovastatin (MEVACOR) 20 MG tablet, Take 1 tablet (20 mg total) by mouth once daily., Disp: 90 tablet, Rfl: 0    methotrexate 5 MG tablet, Take 12.5mg on Saturday and 12.5mg on Sunday max 25mg weekly, Disp: , Rfl:     pregabalin (LYRICA) 75 MG capsule, Take 50 mg by mouth 3 (three) times daily., Disp: , Rfl:     tiZANidine (ZANAFLEX) 4 MG tablet, Take 1 tablet (4 mg total) by mouth every 6 (six) hours as needed., Disp: 30 tablet, Rfl: 0    traZODone (DESYREL) 50 MG tablet, Take 1/2 to one tablet PO HS prn sleeep, Disp: 30 tablet, Rfl: 2    venlafaxine 37.5 mg TR24, Take 75 mg by mouth once daily., Disp: , Rfl:     EASY TOUCH 31 gauge x 3/16" Ndle, USE one needle TO INJECT insulin ONCE DAILY as prescribed, Disp: , Rfl:     ergocalciferol (ERGOCALCIFEROL) 50,000 unit Cap, Take 1 capsule (50,000 Units total) by mouth every 7 days., Disp: 8 capsule, Rfl: 0    folic acid (FOLVITE) 1 MG tablet, Take 1 tablet by mouth once daily. (Patient not taking: Reported on 10/21/2024), Disp: , Rfl:     insulin glargine U-100, Lantus, (BASAGLAR KWIKPEN U-100 " "INSULIN) 100 unit/mL (3 mL) InPn pen, Inject 8 Units into the skin once daily. (Patient not taking: Reported on 9/20/2024), Disp: 7.2 mL, Rfl: 0    lancets (E-Z JECT LANCETS) 32 gauge Misc, 100 lancets by Misc.(Non-Drug; Combo Route) route. USE TO CHECK GLUCOSE ONCE DAILY, Disp: , Rfl:     PANZYGA 10 % Soln, Inject into the vein once a week., Disp: , Rfl:     pen needle, diabetic 32 gauge x 1/4" Ndle, Use one needle to inject insulin daily as prescribed, Disp: 100 each, Rfl: 5    TRUEPLUS PEN NEEDLE 32 gauge x 5/32" Ndle, SMARTSIG:injection Daily, Disp: , Rfl:     Review of Systems   Constitutional:  Negative for appetite change, chills, fatigue, fever and unexpected weight change.   Respiratory:  Negative for cough and shortness of breath.    Cardiovascular:  Negative for chest pain, palpitations and leg swelling.   Gastrointestinal:  Negative for abdominal pain, change in bowel habit, constipation, diarrhea, nausea and vomiting.   Genitourinary:  Negative for dysuria and frequency.   Musculoskeletal:  Negative for arthralgias, gait problem and myalgias.   Neurological:  Negative for dizziness, syncope, light-headedness and headaches.   Psychiatric/Behavioral:  Positive for depressed mood and sleep disturbance. Negative for dysphoric mood, hallucinations and suicidal ideas. The patient is nervous/anxious.        Objective     /81 (BP Location: Left arm, Patient Position: Sitting)   Pulse 72   Temp 97.7 °F (36.5 °C)   Ht 5' 9" (1.753 m)   Wt 88.6 kg (195 lb 6.4 oz)   SpO2 99%   BMI 28.86 kg/m²     Physical Exam   Constitutional: She is oriented to person, place, and time. She appears obese. She is cooperative. No distress.   HENT:   Head: Atraumatic.   Mouth/Throat: Mucous membranes are moist.   Eyes:   Right eye ptosis    Cardiovascular: Normal rate and regular rhythm. Pulmonary:      Effort: Pulmonary effort is normal. No respiratory distress.      Breath sounds: Normal breath sounds. No wheezing, " rhonchi or rales.     Abdominal: Soft. Bowel sounds are normal. She exhibits no distension. There is no abdominal tenderness.   Musculoskeletal:         General: Normal range of motion.      Cervical back: Neck supple.      Right lower leg: No edema.      Left lower leg: No edema.   Neurological: She is alert and oriented to person, place, and time. Gait normal.   Skin: Skin is warm and dry.   Psychiatric: Her speech is normal and behavior is normal. Affect and thought content normal. Her mood appears anxious. Thought content is not paranoid. She expresses no homicidal and no suicidal ideation. She expresses no suicidal plans and no homicidal plans.       Lab Results   Component Value Date    WBC 4.60 09/28/2024    HGB 12.8 09/28/2024    HCT 38.6 09/28/2024    MCV 93.5 09/28/2024     09/28/2024       CMP  Sodium   Date Value Ref Range Status   09/28/2024 135 (L) 136 - 145 mmol/L Final     Potassium   Date Value Ref Range Status   09/28/2024 3.8 3.5 - 5.1 mmol/L Final     Chloride   Date Value Ref Range Status   09/28/2024 104 98 - 107 mmol/L Final     CO2   Date Value Ref Range Status   09/28/2024 25 21 - 32 mmol/L Final     Glucose   Date Value Ref Range Status   09/28/2024 323 (H) 74 - 106 mg/dL Final     BUN   Date Value Ref Range Status   09/28/2024 11 7 - 18 mg/dL Final     Creatinine   Date Value Ref Range Status   09/28/2024 0.97 0.55 - 1.02 mg/dL Final     Calcium   Date Value Ref Range Status   09/28/2024 8.8 8.5 - 10.1 mg/dL Final     Total Protein   Date Value Ref Range Status   09/28/2024 7.5 6.4 - 8.2 g/dL Final     Albumin   Date Value Ref Range Status   09/28/2024 3.0 (L) 3.5 - 5.0 g/dL Final     Bilirubin, Total   Date Value Ref Range Status   09/28/2024 0.3 >0.0 - 1.2 mg/dL Final     Alk Phos   Date Value Ref Range Status   09/28/2024 99 41 - 108 U/L Final     AST   Date Value Ref Range Status   09/28/2024 11 (L) 15 - 37 U/L Final     ALT   Date Value Ref Range Status   09/28/2024 14 13 - 56  U/L Final     Anion Gap   Date Value Ref Range Status   09/28/2024 10 7 - 16 mmol/L Final     eGFR    Date Value Ref Range Status   08/13/2021 67 >=60 mL/min/1.73m² Final     eGFR   Date Value Ref Range Status   04/13/2022 61 >=60 mL/min/1.73m² Final     Lab Results   Component Value Date    TSH 0.766 08/13/2021     Lab Results   Component Value Date    CHOL 216 (H) 07/11/2024    CHOL 232 (H) 06/12/2023    CHOL 216 (H) 04/13/2022     Lab Results   Component Value Date    HDL 51 07/11/2024    HDL 67 (H) 06/12/2023    HDL 58 04/13/2022     Lab Results   Component Value Date    LDLCALC 133 07/11/2024    LDLCALC 143 06/12/2023    LDLCALC 137 04/13/2022     Lab Results   Component Value Date    TRIG 162 (H) 07/11/2024    TRIG 112 06/12/2023    TRIG 106 04/13/2022     Lab Results   Component Value Date    CHOLHDL 4.2 07/11/2024    CHOLHDL 3.5 06/12/2023    CHOLHDL 3.7 04/13/2022     Lab Results   Component Value Date    HGBA1C 9.7 (H) 07/11/2024         Assessment and Plan   Type 2 diabetes mellitus without complication, without long-term current use of insulin  -     Hemoglobin A1C; Future; Expected date: 10/21/2024    Hypertension, unspecified type    Systemic lupus erythematosus, unspecified SLE type, unspecified organ involvement status  Comments:  Dr Desir [facility]    Anxiety  Comments:  Psychiatry : Neetu MANN    Depression, unspecified depression type  Comments:  Psychiatry : Neetu MANN    Grieving    Overweight (BMI 25.0-29.9)          Patient Instructions  Patient Instructions   Lab obtained in clinic today, we will notify you of results and any necessary changes to plan of care     Continue basaglar insulin 12 units daily for now, we will let you know if this needs to be changed when we call about your A1c results     Pt is advised to monitor and document glucose fasting when you wake up before you eat and 2 hours after meal and bring bg log in to all clinic visits, this allows for  medications to be adjusted if needed and it will also allow insurance to continue to cover your testing supplies     Ensure to take medications as directed.    Follow diabetic diet as directed.    Work to achieve normal body weight.   Ensure to exercise 4-5 times per week for 20 minutes.     Schedule routine follow up on 12/3/2024, medication will be due again on 12/9/24

## 2024-10-22 DIAGNOSIS — E11.9 TYPE 2 DIABETES MELLITUS WITHOUT COMPLICATION, WITHOUT LONG-TERM CURRENT USE OF INSULIN: Chronic | ICD-10-CM

## 2024-10-22 RX ORDER — INSULIN GLARGINE 100 [IU]/ML
16 INJECTION, SOLUTION SUBCUTANEOUS DAILY
Qty: 14.4 ML | Refills: 0 | Status: SHIPPED | OUTPATIENT
Start: 2024-10-22 | End: 2025-01-20

## 2024-10-23 ENCOUNTER — HOSPITAL ENCOUNTER (OUTPATIENT)
Dept: RADIOLOGY | Facility: HOSPITAL | Age: 49
Discharge: HOME | End: 2024-10-23
Attending: NURSE PRACTITIONER
Payer: COMMERCIAL

## 2024-10-23 DIAGNOSIS — Z12.31 BREAST CANCER SCREENING BY MAMMOGRAM: ICD-10-CM

## 2024-10-23 PROCEDURE — 77067 SCR MAMMO BI INCL CAD: CPT | Mod: 26,,, | Performed by: RADIOLOGY

## 2024-10-23 PROCEDURE — 77063 BREAST TOMOSYNTHESIS BI: CPT | Mod: TC

## 2024-10-23 PROCEDURE — 77067 SCR MAMMO BI INCL CAD: CPT | Mod: TC

## 2024-10-23 PROCEDURE — 77063 BREAST TOMOSYNTHESIS BI: CPT | Mod: 26,,, | Performed by: RADIOLOGY

## 2024-10-28 ENCOUNTER — OFFICE VISIT (OUTPATIENT)
Dept: OBSTETRICS AND GYNECOLOGY | Facility: CLINIC | Age: 49
End: 2024-10-28
Payer: COMMERCIAL

## 2024-10-28 ENCOUNTER — CLINICAL SUPPORT (OUTPATIENT)
Dept: FAMILY MEDICINE | Facility: CLINIC | Age: 49
End: 2024-10-28
Payer: COMMERCIAL

## 2024-10-28 VITALS
HEIGHT: 69 IN | SYSTOLIC BLOOD PRESSURE: 113 MMHG | DIASTOLIC BLOOD PRESSURE: 68 MMHG | WEIGHT: 200.31 LBS | HEART RATE: 87 BPM | BODY MASS INDEX: 29.67 KG/M2

## 2024-10-28 DIAGNOSIS — Z72.51 HIGH RISK HETEROSEXUAL BEHAVIOR: ICD-10-CM

## 2024-10-28 DIAGNOSIS — Z23 FLU VACCINE NEED: Primary | ICD-10-CM

## 2024-10-28 DIAGNOSIS — Z11.3 ENCOUNTER FOR SCREENING FOR INFECTIONS WITH PREDOMINANTLY SEXUAL MODE OF TRANSMISSION: ICD-10-CM

## 2024-10-28 DIAGNOSIS — N89.8 VAGINAL DISCHARGE: ICD-10-CM

## 2024-10-28 DIAGNOSIS — N95.2 VAGINAL ATROPHY: ICD-10-CM

## 2024-10-28 DIAGNOSIS — Z01.411 ENCOUNTER FOR GYNECOLOGICAL EXAMINATION (GENERAL) (ROUTINE) WITH ABNORMAL FINDINGS: Primary | ICD-10-CM

## 2024-10-28 LAB
CANDIDA SPECIES: NEGATIVE
GARDNERELLA: POSITIVE
TRICHOMONAS: NEGATIVE

## 2024-10-28 PROCEDURE — 87480 CANDIDA DNA DIR PROBE: CPT | Mod: ,,, | Performed by: CLINICAL MEDICAL LABORATORY

## 2024-10-28 PROCEDURE — 3074F SYST BP LT 130 MM HG: CPT | Mod: ,,, | Performed by: ADVANCED PRACTICE MIDWIFE

## 2024-10-28 PROCEDURE — G0008 ADMIN INFLUENZA VIRUS VAC: HCPCS | Mod: ,,, | Performed by: NURSE PRACTITIONER

## 2024-10-28 PROCEDURE — 87491 CHLMYD TRACH DNA AMP PROBE: CPT | Mod: ,,, | Performed by: CLINICAL MEDICAL LABORATORY

## 2024-10-28 PROCEDURE — 87510 GARDNER VAG DNA DIR PROBE: CPT | Mod: ,,, | Performed by: CLINICAL MEDICAL LABORATORY

## 2024-10-28 PROCEDURE — 3066F NEPHROPATHY DOC TX: CPT | Mod: ,,, | Performed by: ADVANCED PRACTICE MIDWIFE

## 2024-10-28 PROCEDURE — 87591 N.GONORRHOEAE DNA AMP PROB: CPT | Mod: ,,, | Performed by: CLINICAL MEDICAL LABORATORY

## 2024-10-28 PROCEDURE — 99396 PREV VISIT EST AGE 40-64: CPT | Mod: 25,,, | Performed by: ADVANCED PRACTICE MIDWIFE

## 2024-10-28 PROCEDURE — 3061F NEG MICROALBUMINURIA REV: CPT | Mod: ,,, | Performed by: ADVANCED PRACTICE MIDWIFE

## 2024-10-28 PROCEDURE — 3008F BODY MASS INDEX DOCD: CPT | Mod: ,,, | Performed by: ADVANCED PRACTICE MIDWIFE

## 2024-10-28 PROCEDURE — 3046F HEMOGLOBIN A1C LEVEL >9.0%: CPT | Mod: ,,, | Performed by: ADVANCED PRACTICE MIDWIFE

## 2024-10-28 PROCEDURE — 3078F DIAST BP <80 MM HG: CPT | Mod: ,,, | Performed by: ADVANCED PRACTICE MIDWIFE

## 2024-10-28 PROCEDURE — 1159F MED LIST DOCD IN RCRD: CPT | Mod: ,,, | Performed by: ADVANCED PRACTICE MIDWIFE

## 2024-10-28 PROCEDURE — 87660 TRICHOMONAS VAGIN DIR PROBE: CPT | Mod: ,,, | Performed by: CLINICAL MEDICAL LABORATORY

## 2024-10-28 PROCEDURE — 90656 IIV3 VACC NO PRSV 0.5 ML IM: CPT | Mod: ,,, | Performed by: NURSE PRACTITIONER

## 2024-10-28 RX ORDER — ESTRADIOL 0.1 MG/G
1 CREAM VAGINAL
Qty: 42.5 G | Refills: 1 | Status: SHIPPED | OUTPATIENT
Start: 2024-10-28 | End: 2025-10-28

## 2024-10-29 ENCOUNTER — TELEPHONE (OUTPATIENT)
Dept: OBSTETRICS AND GYNECOLOGY | Facility: CLINIC | Age: 49
End: 2024-10-29
Payer: COMMERCIAL

## 2024-10-29 DIAGNOSIS — B96.89 BACTERIAL VAGINOSIS: Primary | ICD-10-CM

## 2024-10-29 DIAGNOSIS — N76.0 BACTERIAL VAGINOSIS: Primary | ICD-10-CM

## 2024-10-29 LAB
CHLAMYDIA BY PCR: NEGATIVE
N. GONORRHOEAE (GC) BY PCR: NEGATIVE

## 2024-10-29 RX ORDER — METRONIDAZOLE 500 MG/1
500 TABLET ORAL EVERY 12 HOURS
Qty: 14 TABLET | Refills: 0 | Status: SHIPPED | OUTPATIENT
Start: 2024-10-29

## 2024-12-17 DIAGNOSIS — E11.9 TYPE 2 DIABETES MELLITUS WITHOUT COMPLICATION, WITHOUT LONG-TERM CURRENT USE OF INSULIN: Chronic | ICD-10-CM

## 2024-12-17 DIAGNOSIS — I10 HYPERTENSION, UNSPECIFIED TYPE: Chronic | ICD-10-CM

## 2024-12-17 RX ORDER — LOVASTATIN 20 MG/1
20 TABLET ORAL DAILY
Qty: 30 TABLET | Refills: 0 | Status: SHIPPED | OUTPATIENT
Start: 2024-12-17 | End: 2025-01-16

## 2024-12-17 RX ORDER — INSULIN GLARGINE 100 [IU]/ML
16 INJECTION, SOLUTION SUBCUTANEOUS DAILY
Qty: 6 ML | Refills: 0 | Status: SHIPPED | OUTPATIENT
Start: 2024-12-17

## 2024-12-17 NOTE — TELEPHONE ENCOUNTER
Pt called needing refills on medications. Was suppose to follow up around 12/6/2024 for refills. Pt made an appt for 01/06/2025. Will send 30 days of meds to the pharmacy of [facility] to get her to the appt.

## 2025-01-23 ENCOUNTER — HOSPITAL ENCOUNTER (EMERGENCY)
Facility: HOSPITAL | Age: 50
Discharge: HOME | End: 2025-01-23
Payer: COMMERCIAL

## 2025-01-23 VITALS
SYSTOLIC BLOOD PRESSURE: 111 MMHG | OXYGEN SATURATION: 99 % | HEART RATE: 83 BPM | RESPIRATION RATE: 18 BRPM | WEIGHT: 196 LBS | HEIGHT: 65 IN | DIASTOLIC BLOOD PRESSURE: 87 MMHG | TEMPERATURE: 98 F | BODY MASS INDEX: 32.65 KG/M2

## 2025-01-23 DIAGNOSIS — M54.9 UPPER BACK PAIN ON LEFT SIDE: Primary | ICD-10-CM

## 2025-01-23 DIAGNOSIS — T14.8XXA MUSCLE STRAIN: ICD-10-CM

## 2025-01-23 DIAGNOSIS — R07.9 CHEST PAIN: ICD-10-CM

## 2025-01-23 DIAGNOSIS — Z79.4 TYPE 2 DIABETES MELLITUS WITH HYPERGLYCEMIA, WITH LONG-TERM CURRENT USE OF INSULIN: ICD-10-CM

## 2025-01-23 DIAGNOSIS — F41.9 ANXIETY: ICD-10-CM

## 2025-01-23 DIAGNOSIS — E11.65 TYPE 2 DIABETES MELLITUS WITH HYPERGLYCEMIA, WITH LONG-TERM CURRENT USE OF INSULIN: ICD-10-CM

## 2025-01-23 LAB
ALBUMIN SERPL BCP-MCNC: 3.4 G/DL (ref 3.5–5)
ALBUMIN/GLOB SERPL: 0.8 {RATIO}
ALP SERPL-CCNC: 113 U/L (ref 40–150)
ALT SERPL W P-5'-P-CCNC: 16 U/L
ANION GAP SERPL CALCULATED.3IONS-SCNC: 14 MMOL/L (ref 7–16)
AST SERPL W P-5'-P-CCNC: 20 U/L (ref 5–34)
BASOPHILS # BLD AUTO: 0.03 K/UL (ref 0–0.2)
BASOPHILS NFR BLD AUTO: 0.8 % (ref 0–1)
BILIRUB SERPL-MCNC: 0.4 MG/DL
BUN SERPL-MCNC: 11 MG/DL (ref 10–20)
BUN/CREAT SERPL: 10 (ref 6–20)
CALCIUM SERPL-MCNC: 9.3 MG/DL (ref 8.4–10.2)
CHLORIDE SERPL-SCNC: 101 MMOL/L (ref 98–107)
CO2 SERPL-SCNC: 23 MMOL/L (ref 22–29)
CREAT SERPL-MCNC: 1.06 MG/DL (ref 0.55–1.02)
DIFFERENTIAL METHOD BLD: ABNORMAL
EGFR (NO RACE VARIABLE) (RUSH/TITUS): 64 ML/MIN/1.73M2
EOSINOPHIL # BLD AUTO: 0.05 K/UL (ref 0–0.5)
EOSINOPHIL NFR BLD AUTO: 1.3 % (ref 1–4)
ERYTHROCYTE [DISTWIDTH] IN BLOOD BY AUTOMATED COUNT: 11.9 % (ref 11.5–14.5)
GLOBULIN SER-MCNC: 4.2 G/DL (ref 2–4)
GLUCOSE SERPL-MCNC: 311 MG/DL (ref 70–105)
GLUCOSE SERPL-MCNC: 426 MG/DL (ref 74–100)
HCT VFR BLD AUTO: 39.3 % (ref 38–47)
HGB BLD-MCNC: 13.5 G/DL (ref 12–16)
IMM GRANULOCYTES # BLD AUTO: 0.01 K/UL (ref 0–0.04)
IMM GRANULOCYTES NFR BLD: 0.3 % (ref 0–0.4)
INFLUENZA A MOLECULAR (OHS): NEGATIVE
INFLUENZA B MOLECULAR (OHS): NEGATIVE
LYMPHOCYTES # BLD AUTO: 1.39 K/UL (ref 1–4.8)
LYMPHOCYTES NFR BLD AUTO: 35.5 % (ref 27–41)
MAGNESIUM SERPL-MCNC: 1.9 MG/DL (ref 1.6–2.6)
MCH RBC QN AUTO: 30.8 PG (ref 27–31)
MCHC RBC AUTO-ENTMCNC: 34.4 G/DL (ref 32–36)
MCV RBC AUTO: 89.5 FL (ref 80–96)
MONOCYTES # BLD AUTO: 0.4 K/UL (ref 0–0.8)
MONOCYTES NFR BLD AUTO: 10.2 % (ref 2–6)
MPC BLD CALC-MCNC: 10.2 FL (ref 9.4–12.4)
NEUTROPHILS # BLD AUTO: 2.04 K/UL (ref 1.8–7.7)
NEUTROPHILS NFR BLD AUTO: 51.9 % (ref 53–65)
NRBC # BLD AUTO: 0 X10E3/UL
NRBC, AUTO (.00): 0 %
NT-PROBNP SERPL-MCNC: 18 PG/ML (ref 1–125)
OHS QRS DURATION: 74 MS
OHS QTC CALCULATION: 425 MS
PLATELET # BLD AUTO: 280 K/UL (ref 150–400)
POTASSIUM SERPL-SCNC: 4.6 MMOL/L (ref 3.5–5.1)
PROT SERPL-MCNC: 7.6 G/DL (ref 6.4–8.3)
RBC # BLD AUTO: 4.39 M/UL (ref 4.2–5.4)
SARS-COV-2 RDRP RESP QL NAA+PROBE: NEGATIVE
SODIUM SERPL-SCNC: 133 MMOL/L (ref 136–145)
TROPONIN I SERPL HS-MCNC: <2.7 NG/L
TROPONIN I SERPL HS-MCNC: <2.7 NG/L
WBC # BLD AUTO: 3.92 K/UL (ref 4.5–11)

## 2025-01-23 PROCEDURE — 36415 COLL VENOUS BLD VENIPUNCTURE: CPT

## 2025-01-23 PROCEDURE — 84484 ASSAY OF TROPONIN QUANT: CPT

## 2025-01-23 PROCEDURE — 87502 INFLUENZA DNA AMP PROBE: CPT

## 2025-01-23 PROCEDURE — 93010 ELECTROCARDIOGRAM REPORT: CPT | Performed by: HOSPITALIST

## 2025-01-23 PROCEDURE — 99285 EMERGENCY DEPT VISIT HI MDM: CPT | Mod: 25

## 2025-01-23 PROCEDURE — 80053 COMPREHEN METABOLIC PANEL: CPT

## 2025-01-23 PROCEDURE — 87635 SARS-COV-2 COVID-19 AMP PRB: CPT

## 2025-01-23 PROCEDURE — 99284 EMERGENCY DEPT VISIT MOD MDM: CPT | Mod: EDII,,,

## 2025-01-23 PROCEDURE — 82962 GLUCOSE BLOOD TEST: CPT

## 2025-01-23 PROCEDURE — 96360 HYDRATION IV INFUSION INIT: CPT

## 2025-01-23 PROCEDURE — 83880 ASSAY OF NATRIURETIC PEPTIDE: CPT

## 2025-01-23 PROCEDURE — 25000003 PHARM REV CODE 250

## 2025-01-23 PROCEDURE — 83735 ASSAY OF MAGNESIUM: CPT

## 2025-01-23 PROCEDURE — 85025 COMPLETE CBC W/AUTO DIFF WBC: CPT

## 2025-01-23 PROCEDURE — 93005 ELECTROCARDIOGRAM TRACING: CPT

## 2025-01-23 RX ORDER — FOLIC ACID 1 MG/1
1 TABLET ORAL DAILY
COMMUNITY
Start: 2025-01-16 | End: 2026-01-16

## 2025-01-23 RX ORDER — METHOCARBAMOL 500 MG/1
500 TABLET, FILM COATED ORAL 3 TIMES DAILY
Qty: 15 TABLET | Refills: 0 | Status: SHIPPED | OUTPATIENT
Start: 2025-01-23 | End: 2025-01-28

## 2025-01-23 RX ORDER — METHOCARBAMOL 500 MG/1
500 TABLET, FILM COATED ORAL
Status: COMPLETED | OUTPATIENT
Start: 2025-01-23 | End: 2025-01-23

## 2025-01-23 RX ADMIN — SODIUM CHLORIDE 1000 ML: 9 INJECTION, SOLUTION INTRAVENOUS at 11:01

## 2025-01-23 RX ADMIN — METHOCARBAMOL 500 MG: 500 TABLET ORAL at 11:01

## 2025-01-23 NOTE — Clinical Note
"Tequila Freireghazala Vázquez was seen and treated in our emergency department on 1/23/2025.  She may return to work on 01/26/2025.       If you have any questions or concerns, please don't hesitate to call.      Micheal Salmon, KOFIP"

## 2025-01-23 NOTE — DISCHARGE INSTRUCTIONS
Take Robaxin as prescribed.  Over-the-counter Tylenol as directed for additional pain control.  Moist heat as tolerated.  No lifting.  Follow a strict diabetic diet and continue your usual diabetic and other medications as prescribed.  Follow up with the primary care provider in 1-2 days for a recheck.  Return to the emergency department for uncontrolled pain, shortness of breath, difficulty breathing, uncontrolled vomiting, palpitations, or any other new or worrisome symptoms.

## 2025-01-23 NOTE — ED TRIAGE NOTES
Sharp chest to middle of the chest to her back for 3 hours, started while she was still in bed this am, has had a cough for about an hour prior to chest pain starting, has Lupus and is a diabetic

## 2025-01-23 NOTE — ED PROVIDER NOTES
Encounter Date: 1/23/2025       History     Chief Complaint   Patient presents with    Chest Pain     49-year-old female who presents to the emergency department with complaints of sternal chest pain that began 3 hours prior to arrival and a cough.  Reports a known allergy to aspirin which she states causes her hives but she did treat with 2 Tylenol at the onset of discomfort.  Only other complaint at this time is anxiety.  She reports that the pain does appear to move to her back.  She has a known past medical history of asthma, diabetes, latent syphilis, myasthenia gravis, and lupus.  Blood pressure 113/94, temperature 98.2°, heart rate 92, respirations 20, and oxygen saturation 99% on room air.  She is noted to be speaking in full sentences able to provide an adequate history.  She denies any known fever, shortness of breath, difficulty breathing, vomiting, abdominal pain, weakness, syncope, or any other complaints at this time.  She appears acutely anxious but in no immediate distress.    The history is provided by the patient.     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 Added automatically from request for surgery [id number]    Systemic lupus erythematosus, organ or system involvement unspecified      Past Surgical History:   Procedure Laterality Date    DILATION AND CURETTAGE OF UTERUS  2000    preformed at [facility]    HYSTERECTOMY      MUSCLE BIOPSY      OOPHORECTOMY      pt has had one ovary removed, unsure side    TUBAL LIGATION  1999    [facility]     Family History   Problem Relation Name Age of Onset    Asthma Mother      Coronary artery disease Mother      Diabetes Father      Heart disease  Father      Diabetes Sister      Cancer Sister          Breast    Breast cancer Sister      Diabetes Brother       Social History     Tobacco Use    Smoking status: Never     Passive exposure: Never    Smokeless tobacco: Current     Types: Snuff   Substance Use Topics    Alcohol use: Never    Drug use: Never     Review of Systems   Constitutional:  Negative for appetite change, chills and fever.   HENT:  Positive for congestion. Negative for sore throat and trouble swallowing.    Eyes: Negative.    Respiratory:  Positive for cough. Negative for shortness of breath, wheezing and stridor.    Cardiovascular:  Positive for chest pain. Negative for palpitations and leg swelling.   Gastrointestinal:  Negative for abdominal pain, diarrhea, nausea and vomiting.   Endocrine: Negative.    Genitourinary:  Negative for dysuria and flank pain.   Musculoskeletal:  Positive for back pain. Negative for neck pain and neck stiffness.   Skin:  Negative for color change, pallor and rash.   Allergic/Immunologic: Negative.    Neurological:  Negative for dizziness, syncope, speech difficulty, weakness and headaches.   Hematological:  Does not bruise/bleed easily.   Psychiatric/Behavioral:  Negative for confusion. The patient is nervous/anxious.    All other systems reviewed and are negative.      Physical Exam     Initial Vitals [01/23/25 1022]   BP Pulse Resp Temp SpO2   (!) 113/94 92 20 98.2 °F (36.8 °C) 99 %      MAP       --         Physical Exam    Nursing note and vitals reviewed.  Constitutional: She appears well-developed and well-nourished. She is not diaphoretic. She is Obese . She is active and cooperative. She appears ill. No distress.   HENT:   Head: Normocephalic and atraumatic. Mouth/Throat: Oropharynx is clear and moist.   Eyes: Conjunctivae and EOM are normal. Pupils are equal, round, and reactive to light.   Neck: Neck supple.   Normal range of motion.  Cardiovascular:  Normal rate, regular rhythm, S1 normal, S2 normal,  normal heart sounds and normal pulses.           Pulmonary/Chest: Effort normal and breath sounds normal. No tachypnea. No respiratory distress. She has no decreased breath sounds. She has no wheezes. She has no rhonchi. She has no rales. She exhibits tenderness.     Abdominal: Abdomen is soft. Bowel sounds are normal. She exhibits no distension. There is no abdominal tenderness. There is no rebound and no guarding.   Musculoskeletal:         General: Normal range of motion.      Cervical back: Normal range of motion and neck supple.        Back:      Neurological: She is alert and oriented to person, place, and time. She has normal strength. GCS score is 15. GCS eye subscore is 4. GCS verbal subscore is 5. GCS motor subscore is 6.   Skin: Skin is warm and dry. Capillary refill takes less than 2 seconds.   Psychiatric: Her speech is normal and behavior is normal. Judgment and thought content normal. Her mood appears anxious. She is not actively hallucinating. Cognition and memory are normal. She is attentive.         Medical Screening Exam   See Full Note    ED Course   Procedures  Labs Reviewed   COMPREHENSIVE METABOLIC PANEL - Abnormal       Result Value    Sodium 133 (*)     Potassium 4.6      Chloride 101      CO2 23      Anion Gap 14      Glucose 426 (*)     BUN 11      Creatinine 1.06 (*)     BUN/Creatinine Ratio 10      Calcium 9.3      Total Protein 7.6      Albumin 3.4 (*)     Globulin 4.2 (*)     A/G Ratio 0.8      Bilirubin, Total 0.4      Alk Phos 113      ALT 16      AST 20      eGFR 64     CBC WITH DIFFERENTIAL - Abnormal    WBC 3.92 (*)     RBC 4.39      Hemoglobin 13.5      Hematocrit 39.3      MCV 89.5      MCH 30.8      MCHC 34.4      RDW 11.9      Platelet Count 280      MPV 10.2      Neutrophils % 51.9 (*)     Lymphocytes % 35.5      Monocytes % 10.2 (*)     Eosinophils % 1.3      Basophils % 0.8      Immature Granulocytes % 0.3      nRBC, Auto 0.0      Neutrophils, Abs 2.04      Lymphocytes,  Absolute 1.39      Monocytes, Absolute 0.40      Eosinophils, Absolute 0.05      Basophils, Absolute 0.03      Immature Granulocytes, Absolute 0.01      nRBC, Absolute 0.00      Diff Type Auto     INFLUENZA A & B BY MOLECULAR - Normal    INFLUENZA A MOLECULAR Negative      INFLUENZA B MOLECULAR  Negative     MAGNESIUM - Normal    Magnesium 1.9     TROPONIN I - Normal    Troponin I High Sensitivity <2.7     SARS-COV-2 RNA AMPLIFICATION, QUAL - Normal    SARS COV-2 Molecular Negative      Narrative:     Negative SARS-CoV results should not be used as the sole basis for treatment or patient management decisions; negative results should be considered in the context of a patient's recent exposures, history and the presene of clinical signs and symptoms consistent with COVID-19.  Negative results should be treated as presumptive and confirmed by molecular assay, if necessary for patient management.   NT-PRO NATRIURETIC PEPTIDE - Normal    ProBNP 18     TROPONIN I - Normal    Troponin I High Sensitivity <2.7     CBC W/ AUTO DIFFERENTIAL    Narrative:     The following orders were created for panel order CBC auto differential.  Procedure                               Abnormality         Status                     ---------                               -----------         ------                     CBC with Differential[[id number]]       Abnormal            Final result                 Please view results for these tests on the individual orders.          Imaging Results              X-Ray Chest AP Portable (Final result)  Result time 01/23/25 11:39:08      Final result by Woody Kerr Jr., MD (01/23/25 11:39:08)                   Impression:      No acute abnormality.      Electronically signed by: Woody Kerr MD  Date:    01/23/2025  Time:    11:39               Narrative:    EXAMINATION:  XR CHEST AP PORTABLE    CLINICAL HISTORY:  Chest Pain;    TECHNIQUE:  Single frontal view of the chest was  performed.    COMPARISON:  Chest x-ray of September 3, 2020    FINDINGS:  The lungs are clear, with normal appearance of pulmonary vasculature and no pleural effusion or pneumothorax.    The cardiac silhouette is normal in size. The hilar and mediastinal contours are unremarkable.    Bones are intact.                                       Medications   sodium chloride 0.9% bolus 1,000 mL 1,000 mL (1,000 mLs Intravenous New Bag 1/23/25 1154)   methocarbamoL tablet 500 mg (500 mg Oral Given 1/23/25 1154)     Medical Decision Making  Presents for evaluation of chest pain and cough that began 3 hours prior to arrival.  Alert and oriented x4.  GCS 15.  Vital signs stable.  Reports anxiety and does appear anxious at the time of my evaluation.  Treated with Tylenol PTA.  No significant improvement at this time.  Allergic to aspirin.  Breath sounds equal and clear bilaterally to auscultation.  Abdomen is soft and nontender to palpation.  Pain does radiate to her back.  Denies any respiratory complaints outside of the cough.  We will initiate a cardiac workup with the addition of flu and COVID swabs.  No aspirin given her history of hive reaction.  EKG shows no STEMI.    11:45 patient re-evaluated at this time.  Results discussed in detail.  Troponin negative.  She is no longer anxious and has family at the bedside.  She is now reporting that the discomfort today is primarily in the muscles of her left upper back and radiates to her chest when the discomfort intensifies in her back.  States she has had this intermittently for a long while and believes she flared it up by reaching down to pick something up yesterday.  Area reexamined and remains tender to palpation and worse with ROM.  Palpable spasm noted.  We suspect muscle strain/spasm of the upper back and we will provide Robaxin 500 mg p.o. here in the ED for treatment.  Heart score 2.  Given her initial complaints of chest pain we will perform a repeat troponin and  anticipate discharge if negative on repeat.  Also noted to be hyperglycemic with hyponatremia so we will provide 1 L of normal saline while further workup is pending.  We did discuss Rx for low-dose Robaxin as well as over-the-counter Tylenol for additional pain control.  Heating pad as tolerated.  No lifting.  Following up with PCP in 1-2 days for a recheck and have discussed strict ED return precautions as well.  She verbalizes understanding and is agreement with this plan.    12:25 patient re-evaluated at this time.  Delta troponin remains negative.  Glucose 311 after IVF. She reports that she is feeling better after the Robaxin.  We did recap her disposition plan, medication information, and follow up recommendations.  Strict ED return precautions explained in detail.  All questions answered.  She verbalizes understanding and is agreement with this plan.    Amount and/or Complexity of Data Reviewed  Independent Historian:      Details: 49-year-old female who presents to the emergency department with complaints of sternal chest pain that began 3 hours prior to arrival and a cough.  Reports a known allergy to aspirin which she states causes her hives but she did treat with 2 Tylenol at the onset of discomfort.  Only other complaint at this time is anxiety.  She reports that the pain does appear to move to her back.  She has a known past medical history of asthma, diabetes, latent syphilis, myasthenia gravis, and lupus.  Blood pressure 113/94, temperature 98.2°, heart rate 92, respirations 20, and oxygen saturation 99% on room air.  She is noted to be speaking in full sentences able to provide an adequate history.  She denies any known fever, shortness of breath, difficulty breathing, vomiting, abdominal pain, weakness, syncope, or any other complaints at this time.  She appears acutely anxious but in no immediate distress.  Labs: ordered.     Details: CBC shows a WBC of 3.92, hemoglobin of 13.5, hematocrit of  39.3, platelet count 280.  Pro BNP negative at 18.  Magnesium normal at 1.9.  Initial troponin negative at less than 2.7.  Flu negative.  COVID negative.  CMP shows a sodium of 133, glucose of 426, creatinine of 1.06, albumin of 3.4, globulin of 4.2, and otherwise unremarkable.  Repeat troponin remains negative at less than 2.7.  Radiology: ordered.     Details: Chest x-ray shows no acute abnormality.  ECG/medicine tests: ordered.     Details: EKG shows normal sinus rhythm at a rate of 89 beats per minute.  No STEMI.    Risk  Prescription drug management.  Risk Details: Patient presents for emergent evaluation of acute chest and back pain that poses a threat to life and/or bodily function.    Final diagnoses:  [R07.9] Chest pain  [M54.9] Upper back pain on left side (Primary)  [T14.8XXA] Muscle strain  [F41.9] Anxiety  [E11.65, Z79.4] Type 2 diabetes mellitus with hyperglycemia, with long-term current use of insulin  I ordered labs and personally reviewed them.  Labs significant for negative troponin x2  I ordered X-rays and personally reviewed them and reviewed the radiologist interpretation.  Xray significant for no acute process.    I ordered EKG and personally reviewed it.  EKG significant for no STEMI.    Diagnosis, home care, follow up, medication formation, and strict ED return precautions explained in detail.  All questions answered.  She verbalizes understanding and is agreement with this plan.  Patient was managed in the ED with Robaxin 500 mg p.o..  The response to treatment was improved.    Patient was discharged in stable condition.  Detailed return precautions discussed.       Additional MDM:   Heart Score:    History:          Slightly suspicious.  ECG:             Normal  Age:               45-65 years  Risk factors: 1-2 risk factors  Troponin:       Less than or equal to normal limit  Heart Score = 2                                       Clinical Impression:   Final diagnoses:  [R07.9] Chest  pain  [M54.9] Upper back pain on left side (Primary)  [T14.8XXA] Muscle strain  [F41.9] Anxiety  [E11.65, Z79.4] Type 2 diabetes mellitus with hyperglycemia, with long-term current use of insulin        ED Disposition Condition    Discharge Stable          ED Prescriptions       Medication Sig Dispense Start Date End Date Auth. Provider    methocarbamoL (ROBAXIN) 500 MG Tab Take 1 tablet (500 mg total) by mouth 3 (three) times daily. for 5 days 15 tablet 1/23/2025 1/28/2025 Micheal Salmon FNP          Follow-up Information       Follow up With Specialties Details Why Contact Info    Elizabeth Schmidt FNP Family Medicine Schedule an appointment as soon as possible for a visit in 2 days  [address] [place] [address]  [facility] [place] [address]  [phone]               Micheal Salmon FNP  01/23/25 4400       Micheal Salmon FNP  01/23/25 1231

## 2025-01-27 PROBLEM — D84.9 IMMUNOSUPPRESSION: Status: ACTIVE | Noted: 2025-01-27

## 2025-01-27 NOTE — PROGRESS NOTES
Clinic note     Patient name: Tequila Vázquez is a 49 y.o. female   Chief compliant   Chief Complaint   Patient presents with    Diabetes     Pt here to follow up on elevated glucose. Pt states her glucose is running in the 200 range fasting.       Subjective     History of present illness   In clinic for follow up on DM    ED visit on 1/23/25, record reviewed     Last A1c 13.2 on 10/21/2024, was instructed to increase basaglar insulin to 16 units, keep bg log and follow up in clinic in two weeks, she did not keep follow up as recommended   Checking blood glucose BID  Blood glucose log : not in clinic, reports fasting bg 200-300  Glucometer: does not have in clinic      Seeing Dr Camacho for pain management  Psychiatry: JOHNATHAN De Anda follow up scheduled today      Hx of right eyelid ptosis and weakness of RUE and RLE, myasthenia gravis, SLE,  recently seen by [facility] neurology,  Dr Desir and ANDREA Rogers, NP-C at  neurology [facility]  She has been receiving  IVIG infusions twice monthly                       Social History     Tobacco Use    Smoking status: Never     Passive exposure: Never    Smokeless tobacco: Current     Types: Snuff   Substance Use Topics    Alcohol use: Never    Drug use: Never       Review of patient's allergies indicates:   Allergen Reactions    Aspirin Hives       Past Medical History:   Diagnosis Date    Asthma     Diabetes mellitus     History of colonic polyps 04/16/2024    Late syphilis, 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 Added automatically from request for surgery [id number]    Systemic lupus erythematosus, organ or system involvement unspecified        Past Surgical History:   Procedure Laterality Date    DILATION AND CURETTAGE OF UTERUS  2000    preformed at [facility]    HYSTERECTOMY      MUSCLE BIOPSY      OOPHORECTOMY      pt has had one ovary removed, unsure side    TUBAL  "LIGATION  1999    [facility]        Family History   Problem Relation Name Age of Onset    Asthma Mother      Coronary artery disease Mother      Diabetes Father      Heart disease Father      Diabetes Sister      Cancer Sister          Breast    Breast cancer Sister      Diabetes Brother           Current Outpatient Medications:     ARIPiprazole (ABILIFY) 5 MG Tab, Take 5 mg by mouth once daily., Disp: , Rfl:     albuterol (PROVENTIL/VENTOLIN HFA) 90 mcg/actuation inhaler, Inhale 2 puffs into the lungs every 6 (six) hours as needed for Wheezing. Rescue, Disp: 18 g, Rfl: 2    EASY TOUCH 31 gauge x 3/16" Ndle, USE one needle TO INJECT insulin ONCE DAILY as prescribed, Disp: , Rfl:     fluticasone propionate (FLONASE) 50 mcg/actuation nasal spray, 1 spray (50 mcg total) by Each Nostril route once daily., Disp: 16 g, Rfl: 2    folic acid (FOLVITE) 1 MG tablet, Take 1 tablet by mouth once daily., Disp: , Rfl:     insulin glargine U-100, Lantus, (BASAGLAR KWIKPEN U-100 INSULIN) 100 unit/mL (3 mL) InPn pen, Inject 20 Units into the skin once daily., Disp: 6 mL, Rfl: 0    lancets (E-Z JECT LANCETS) 32 gauge Misc, 100 lancets by Misc.(Non-Drug; Combo Route) route. USE TO CHECK GLUCOSE ONCE DAILY, Disp: , Rfl:     lovastatin (MEVACOR) 20 MG tablet, Take 1 tablet (20 mg total) by mouth once daily., Disp: 90 tablet, Rfl: 0    methocarbamoL (ROBAXIN) 500 MG Tab, Take 1 tablet (500 mg total) by mouth 3 (three) times daily. for 5 days, Disp: 15 tablet, Rfl: 0    methotrexate 5 MG tablet, Take 12.5mg on Saturday and 12.5mg on Sunday max 25mg weekly, Disp: , Rfl:     PANZYGA 10 % Soln, Inject into the vein once a week., Disp: , Rfl:     pen needle, diabetic 32 gauge x 1/4" Ndle, Use one needle to inject insulin daily as prescribed, Disp: 100 each, Rfl: 5    TRUEPLUS PEN NEEDLE 32 gauge x 5/32" Ndle, SMARTSIG:injection Daily, Disp: , Rfl:     Review of Systems   Constitutional:  Negative for appetite change, chills, fatigue, fever " "and unexpected weight change.   Respiratory:  Negative for cough and shortness of breath.    Cardiovascular:  Negative for chest pain, palpitations and leg swelling.   Gastrointestinal:  Negative for abdominal pain, change in bowel habit, constipation, diarrhea, nausea and vomiting.   Genitourinary:  Negative for dysuria and frequency.   Musculoskeletal:  Negative for arthralgias, gait problem and myalgias.   Neurological:  Negative for dizziness, syncope, light-headedness and headaches.   Psychiatric/Behavioral:  Negative for dysphoric mood and sleep disturbance. The patient is not nervous/anxious.        Objective     BP (!) 93/53 (Patient Position: Sitting)   Pulse 88   Resp 13   Ht 5' 5" (1.651 m)   Wt 91.7 kg (202 lb 3.2 oz)   SpO2 98%   BMI 33.65 kg/m²     Physical Exam   Constitutional: She is oriented to person, place, and time. She appears obese. She is cooperative. No distress.   HENT:   Head: Atraumatic.   Mouth/Throat: Mucous membranes are moist.   Eyes:   Right eye ptosis     Cardiovascular: Normal rate and regular rhythm. Pulmonary:      Effort: Pulmonary effort is normal. No respiratory distress.      Breath sounds: Normal breath sounds. No wheezing, rhonchi or rales.     Abdominal: Soft. Bowel sounds are normal. She exhibits no distension. There is no abdominal tenderness.   Musculoskeletal:         General: Normal range of motion.      Cervical back: Neck supple.      Right lower leg: No edema.      Left lower leg: No edema.   Neurological: She is alert and oriented to person, place, and time. Gait normal.   Skin: Skin is warm and dry.   Psychiatric: Her speech is normal and behavior is normal. Mood, affect and thought content normal. Thought content is not paranoid. She expresses no homicidal and no suicidal ideation. She expresses no suicidal plans and no homicidal plans.       Lab Results   Component Value Date    WBC 3.92 (L) 01/23/2025    HGB 13.5 01/23/2025    HCT 39.3 01/23/2025    MCV " 89.5 01/23/2025     01/23/2025       CMP  Sodium   Date Value Ref Range Status   01/23/2025 133 (L) 136 - 145 mmol/L Final     Potassium   Date Value Ref Range Status   01/23/2025 4.6 3.5 - 5.1 mmol/L Final     Chloride   Date Value Ref Range Status   01/23/2025 101 98 - 107 mmol/L Final     CO2   Date Value Ref Range Status   01/23/2025 23 22 - 29 mmol/L Final     Glucose   Date Value Ref Range Status   01/23/2025 426 (H) 74 - 100 mg/dL Final     BUN   Date Value Ref Range Status   01/23/2025 11 10 - 20 mg/dL Final     Creatinine   Date Value Ref Range Status   01/23/2025 1.06 (H) 0.55 - 1.02 mg/dL Final     Calcium   Date Value Ref Range Status   01/23/2025 9.3 8.4 - 10.2 mg/dL Final     Total Protein   Date Value Ref Range Status   01/23/2025 7.6 6.4 - 8.3 g/dL Final     Albumin   Date Value Ref Range Status   01/23/2025 3.4 (L) 3.5 - 5.0 g/dL Final     Bilirubin, Total   Date Value Ref Range Status   01/23/2025 0.4 <=1.5 mg/dL Final     Alk Phos   Date Value Ref Range Status   01/23/2025 113 40 - 150 U/L Final     AST   Date Value Ref Range Status   01/23/2025 20 5 - 34 U/L Final     ALT   Date Value Ref Range Status   01/23/2025 16 <=55 U/L Final     Anion Gap   Date Value Ref Range Status   01/23/2025 14 7 - 16 mmol/L Final     eGFR    Date Value Ref Range Status   08/13/2021 67 >=60 mL/min/1.73m² Final     eGFR   Date Value Ref Range Status   04/13/2022 61 >=60 mL/min/1.73m² Final     Lab Results   Component Value Date    TSH 0.766 08/13/2021     Lab Results   Component Value Date    CHOL 216 (H) 07/11/2024    CHOL 232 (H) 06/12/2023    CHOL 216 (H) 04/13/2022     Lab Results   Component Value Date    HDL 51 07/11/2024    HDL 67 (H) 06/12/2023    HDL 58 04/13/2022     Lab Results   Component Value Date    LDLCALC 133 07/11/2024    LDLCALC 143 06/12/2023    LDLCALC 137 04/13/2022     Lab Results   Component Value Date    TRIG 162 (H) 07/11/2024    TRIG 112 06/12/2023    TRIG 106  04/13/2022     Lab Results   Component Value Date    CHOLHDL 4.2 07/11/2024    CHOLHDL 3.5 06/12/2023    CHOLHDL 3.7 04/13/2022     Lab Results   Component Value Date    HGBA1C 13.2 (H) 10/21/2024         Assessment and Plan   Type 2 diabetes mellitus without complication, without long-term current use of insulin  -     Hemoglobin A1C; Future; Expected date: 01/28/2025  -     insulin glargine U-100, Lantus, (BASAGLAR KWIKPEN U-100 INSULIN) 100 unit/mL (3 mL) InPn pen; Inject 20 Units into the skin once daily.  Dispense: 6 mL; Refill: 0  -     Ambulatory referral/consult to Diabetes Education; Future; Expected date: 02/04/2025    Hypertension, unspecified type  -     lovastatin (MEVACOR) 20 MG tablet; Take 1 tablet (20 mg total) by mouth once daily.  Dispense: 90 tablet; Refill: 0    Hyperlipidemia, unspecified hyperlipidemia type    Systemic lupus erythematosus, unspecified SLE type, unspecified organ involvement status    Class 1 obesity due to excess calories with serious comorbidity and body mass index (BMI) of 33.0 to 33.9 in adult          Patient Instructions  Patient Instructions   Increase basaglar insulin to 20 units daily   Pt is advised to monitor and document glucose fasting when you wake up before you eat and 2 hours after meal and bring bg log in to all clinic visits, this allows for medications to be adjusted if needed and it will also allow insurance to continue to cover your testing supplies   Ensure to take medications as directed.    Follow diabetic diet as directed.    Work to achieve normal body weight.   Ensure to exercise 4-5 times per week for 20 minutes.   Referral to diabetic education Sushant ZEPEDA    Follow up in clinic in two weeks with bg log to make additional medication changes

## 2025-01-28 ENCOUNTER — OFFICE VISIT (OUTPATIENT)
Dept: FAMILY MEDICINE | Facility: CLINIC | Age: 50
End: 2025-01-28
Payer: COMMERCIAL

## 2025-01-28 VITALS
BODY MASS INDEX: 33.69 KG/M2 | HEIGHT: 65 IN | SYSTOLIC BLOOD PRESSURE: 93 MMHG | RESPIRATION RATE: 13 BRPM | DIASTOLIC BLOOD PRESSURE: 53 MMHG | HEART RATE: 88 BPM | OXYGEN SATURATION: 98 % | WEIGHT: 202.19 LBS

## 2025-01-28 DIAGNOSIS — E66.09 CLASS 1 OBESITY DUE TO EXCESS CALORIES WITH SERIOUS COMORBIDITY AND BODY MASS INDEX (BMI) OF 33.0 TO 33.9 IN ADULT: ICD-10-CM

## 2025-01-28 DIAGNOSIS — I10 HYPERTENSION, UNSPECIFIED TYPE: Chronic | ICD-10-CM

## 2025-01-28 DIAGNOSIS — E66.811 CLASS 1 OBESITY DUE TO EXCESS CALORIES WITH SERIOUS COMORBIDITY AND BODY MASS INDEX (BMI) OF 33.0 TO 33.9 IN ADULT: ICD-10-CM

## 2025-01-28 DIAGNOSIS — E11.9 TYPE 2 DIABETES MELLITUS WITHOUT COMPLICATION, WITHOUT LONG-TERM CURRENT USE OF INSULIN: Primary | Chronic | ICD-10-CM

## 2025-01-28 DIAGNOSIS — M32.9 SYSTEMIC LUPUS ERYTHEMATOSUS, UNSPECIFIED SLE TYPE, UNSPECIFIED ORGAN INVOLVEMENT STATUS: Chronic | ICD-10-CM

## 2025-01-28 DIAGNOSIS — E78.5 HYPERLIPIDEMIA, UNSPECIFIED HYPERLIPIDEMIA TYPE: Chronic | ICD-10-CM

## 2025-01-28 LAB
EST. AVERAGE GLUCOSE BLD GHB EST-MCNC: 329 MG/DL
HBA1C MFR BLD HPLC: 13.1 %

## 2025-01-28 PROCEDURE — 99214 OFFICE O/P EST MOD 30 MIN: CPT | Mod: ,,, | Performed by: NURSE PRACTITIONER

## 2025-01-28 PROCEDURE — 3074F SYST BP LT 130 MM HG: CPT | Mod: ,,, | Performed by: NURSE PRACTITIONER

## 2025-01-28 PROCEDURE — 83036 HEMOGLOBIN GLYCOSYLATED A1C: CPT | Mod: ,,, | Performed by: CLINICAL MEDICAL LABORATORY

## 2025-01-28 PROCEDURE — 3008F BODY MASS INDEX DOCD: CPT | Mod: ,,, | Performed by: NURSE PRACTITIONER

## 2025-01-28 PROCEDURE — 1159F MED LIST DOCD IN RCRD: CPT | Mod: ,,, | Performed by: NURSE PRACTITIONER

## 2025-01-28 PROCEDURE — 3078F DIAST BP <80 MM HG: CPT | Mod: ,,, | Performed by: NURSE PRACTITIONER

## 2025-01-28 PROCEDURE — 1160F RVW MEDS BY RX/DR IN RCRD: CPT | Mod: ,,, | Performed by: NURSE PRACTITIONER

## 2025-01-28 RX ORDER — ARIPIPRAZOLE 5 MG/1
5 TABLET ORAL DAILY
COMMUNITY

## 2025-01-28 RX ORDER — LOVASTATIN 20 MG/1
20 TABLET ORAL DAILY
Qty: 90 TABLET | Refills: 0 | Status: SHIPPED | OUTPATIENT
Start: 2025-01-28

## 2025-01-28 RX ORDER — INSULIN GLARGINE 100 [IU]/ML
20 INJECTION, SOLUTION SUBCUTANEOUS DAILY
Qty: 6 ML | Refills: 0 | Status: SHIPPED | OUTPATIENT
Start: 2025-01-28

## 2025-01-28 NOTE — PATIENT INSTRUCTIONS
Increase basaglar insulin to 20 units daily   Pt is advised to monitor and document glucose fasting when you wake up before you eat and 2 hours after meal and bring bg log in to all clinic visits, this allows for medications to be adjusted if needed and it will also allow insurance to continue to cover your testing supplies   Ensure to take medications as directed.    Follow diabetic diet as directed.    Work to achieve normal body weight.   Ensure to exercise 4-5 times per week for 20 minutes.   Referral to diabetic education Sushant ZEPEDA    Follow up in clinic in two weeks with bg log to make additional medication changes

## 2025-01-29 ENCOUNTER — CLINICAL SUPPORT (OUTPATIENT)
Dept: DIABETES | Facility: CLINIC | Age: 50
End: 2025-01-29
Payer: COMMERCIAL

## 2025-01-29 VITALS — WEIGHT: 202 LBS | HEIGHT: 65 IN | BODY MASS INDEX: 33.66 KG/M2

## 2025-01-29 DIAGNOSIS — E11.9 TYPE 2 DIABETES MELLITUS WITHOUT COMPLICATION, WITHOUT LONG-TERM CURRENT USE OF INSULIN: Chronic | ICD-10-CM

## 2025-01-29 PROCEDURE — G0108 DIAB MANAGE TRN  PER INDIV: HCPCS | Mod: PBBFAC | Performed by: INTERNAL MEDICINE

## 2025-01-29 PROCEDURE — 99999 PR PBB SHADOW E&M-EST. PATIENT-LVL III: CPT | Mod: PBBFAC,,,

## 2025-01-29 PROCEDURE — 99213 OFFICE O/P EST LOW 20 MIN: CPT | Mod: PBBFAC

## 2025-01-29 PROCEDURE — 99999PBSHW PR PBB SHADOW TECHNICAL ONLY FILED TO HB: Mod: PBBFAC,,,

## 2025-01-29 NOTE — PROGRESS NOTES
"Diabetes Care Specialist Progress Note  Author: DAVIS NAVA RN  Date: 1/29/2025    Intake    Program Intake  Reason for Diabetes Program Visit:: Initial Diabetes Assessment  Current diabetes risk level: moderate (1.5)  In the last month, have you used the ER or been admitted to the hospital: No  Permission to speak with others about care:: no    Current Diabetes Treatment: Insulin  Method of insulin delivery?: Injections  Injection Type: Pens  Pen Type/Dose: insulin glargine U-100, Lantus, (BASAGLAR KWIKPEN U-100 INSULIN) 100 unit/mL (3 mL) InPn pen: Inject 20 Units into the skin once daily. - Subcutaneous    Continuous Glucose Monitoring  Patient has CGM: No    Lab Results   Component Value Date    HGBA1C 13.1 (H) 01/28/2025       Referred to Diabetes Care Specialist  by MEENA Worley for uncontrolled blood sugar.  Patient was attentive and asked questions about food choices, blood sugar guidelines. .       Weight: 91.6 kg (202 lb)   Height: 5' 5" (165.1 cm)   Body mass index is 33.61 kg/m².    Lifestyle Coping Support & Clinical    Lifestyle/Coping/Support  Compared to other people your age, how would you rate your health?: Poor  Does anyone in your family have diabetes or does anyone in your family support you in your diabetes care?: Mother  How do you deal with stress/distress?: Pedro Luis worthy, has 2 close friends she talks with, See's a councelor, spirtual support, listens to Christianity music.  (Son got murdered in September 2024 and  griving)  Learning Barriers:: None  Culture or Christianity beliefs that may impact ability to access healthcare: No  Psychosocial/Coping Skills Assessment Completed: : Yes  Assessment indicates:: Instruction Needed  Area of need?: Yes    Problem Review  Active Comorbidities: Other (comment), Hyperlipidemia/Dyslipidemia, Obesity (Myasthenia gravis, systemic lupus erythematosus)    Diabetes Self-Management Skills Assessment    Medication Skills Assessment  Patient is able to identify " current diabetes medications, dosages, and appropriate timing of medications.: yes  Patient reports problems or concerns with current medication regimen.: yes  Medication regimen problems/concerns:: does not feel like regimen is working  Patient is  aware that some diabetes medications can cause low blood sugar?: Yes (No history of low blood sugar)  Medication Skills Assessment Completed:: Yes  Assessment indicates:: Instruction Needed  Area of need?: Yes    Diabetes Disease Process/Treatment Options  Diabetes Type?: Type II  When were you diagnosed?: 10 years ago - about 2015  If previous diabetes education, when/where:: Yes  What are your goals for this education session?: Get blood sugar down  Is patient aware of what causes diabetes?: No  Does patient understand the pathophysiology of diabetes?: No  Diabetes Disease Process/Treatment Options: Skills Assessment Completed: Yes  Assessment indicates:: Knowledge deficit, Instruction Needed  Area of need?: Yes    Nutrition/Healthy Eating  Meal Plan 24 Hour Recall - Breakfast: eats at 7-8: oakmeal (cooks it herself) uses water, egg, green tea and monisha,  No artificial sweeter or sugar  Meal Plan 24 Hour Recall - Lunch: 2:30 - 3:00: Eats soul food: redbeans and rice, (Can eat sting beans 7 days week) fish fried in olive oil, sweet potatoes, turkey sandwichs on wheat, loves fruit  Meal Plan 24 Hour Recall - Dinner: mom boiled some gizzards with hotsauce,water  Meal Plan 24 Hour Recall - Snack: don't snack  Meal Plan 24 Hour Recall - Beverage: unsweetened everything.  Who shops/cooks?: self  Patient can identify foods that impact blood sugar.: no (see comments)  Challenges to healthy eating:: other (see comments) (skipping lunch and going 6-7 hours inbetween breakfast and dinner.  States doens't snack)  Nutrition/Healthy Eating Skills Assessment Completed:: Yes  Assessment indicates:: Knowledge deficit, Instruction Needed  Area of need?: Yes    Physical  "Activity/Exercise  Patient's daily activity level:: sedentary  Patient can identify forms of physical activity.: no  Physical Activity/Exercise Skills Assessment Completed: : Yes  Assessment indicates:: Knowledge deficit, Instruction Needed  Area of need?: Yes    Home Blood Glucose Monitoring  Patient states that blood sugar is checked at home daily.: yes  Monitoring Method:: home glucometer  Fasting BG range history:: 230's to 250's  Postmeal BG range history:: afternoon: 300's  What are your blood glucose targets?: Doesn't know  How often do you check your blood sugar?: 2 x day  What is your A1c Target?: Doesn't know  Home Blood Glucose Monitoring Skills Assessment Completed: : Yes  Area of need?: Yes    Acute Complications  Area of need?: Deferred (Time)    Chronic Complications  Area of need?: Deferred (time)      Assessment Summary and Plan    Based on today's diabetes care assessment, the following areas of need were identified:      Identified Areas of Need      Medication/Current Diabetes Treatment: Yes -  Provided review of current diabetes medication action, dosage, frequency, side effects, and storage guidelines. Discussed guidelines for preventing, detecting, and treating hypoglycemia and hyperglycemia. Reviewed the importance of meal and medication timing with diabetes mediations for prevention of acute complications and maximum drug benefit.  Discussed calling HCP if not tolerating medication or if blood sugar is uncontrolled with current regimen. Handout provided on current diabetes medication and safety with medications.     Lifestyle Coping/Support: Yes  - Discussed behavioral strategies for improving social and environmental support of lifestyle changes. Discussed community resources for long term diabetes self-management support and progress. Discussed role of stress on diabetes management. A handout provided on "Diabetes Distress" and "Diabetes and Mental Health" from the ADA.       Diabetes " Disease Process/Treatment Options: Yes - Reviewed diabetes pathophysiology, different types of diabetes, signs and symptoms, risk factors and treatment guidelines.  Handout Provided on Understanding Type 2 Diabetes and Management of diabetes.        Nutrition/Healthy Eating: Yes - See Care Plan     Physical Activity/Exercise: Yes - See Care Plan     Home Blood Glucose Monitoring: Yes - See Care Plan     Acute Complications: Deferred (Time)      Chronic Complications: Deferred (time)       Today's interventions were provided through individual discussion, instruction, and written materials were provided.      Patient verbalized understanding of instruction and written materials.  Pt was able to return back demonstration of instructions today. Patient understood key points, needs reinforcement and further instruction.     Diabetes Self-Management Care Plan:    Today's Diabetes Self-Management Care Plan was developed with Tequila's input. Tequila has agreed to work toward the following goal(s) to improve his/her overall diabetes control.      Care Plan: Diabetes Management        Problem: Healthy Eating         Goal: Eat 3 meals daily with 30-45g (2-3) servings of Carbohydrate per meal.    Start Date: 1/29/2025   Expected End Date: 2/26/2025   Barriers: No Barriers Identified   Note:    Reviewed the sources and role of Carbohydrate, Protein, and Fat and how each nutrient impact blood sugar. Provided meal-planning instruction via food lists, plate method, food models, food labels. Reviewed micro/macronutrient effect on health management. Discussed carbohydrate counting and spacing. Reviewed principles of energy metabolism to assist weight and health management. Discussed strategies for healthier dining out and replacing sugary beverages with water and other noncaloric, sugar free options.   (Several handouts provided:  Planning a healthy meal, Building a Balanced Meal, Heart-Healthy Consistent Carbohydrate Nutrition  Th       Task: Review the importance of balancing carbohydrates with each meal using portion control techniques to count servings of carbohydrate and label reading to identify serving size and amount of total carbs per serving. Completed 1/29/2025        Task: Provided Sample plate method and reviewed the use of the plate to estimate amounts of carbohydrate per meal. Completed 1/29/2025        Problem: Physical Activity and Exercise         Goal: Patient agrees to increase physical activity to a goal of 5 times per week for 30 minutes.    Start Date: 1/29/2025   Expected End Date: 2/26/2025   Barriers: No Barriers Identified   Note:    Discussed importance of daily physical activity with review of benefits, methods, guidelines, and precautions.  A handout on Diabetes and Physical Activity provided.      Task: Discussed role of physical activity on reducing insulin resistance and improvement in overall glycemic control. Completed 1/29/2025        Task: Discussed role of physical activity as it relates to weight loss Completed 1/29/2025        Task: Offered suggestions on how patient could increase their regular physical activity Completed 1/29/2025          Problem: Blood Glucose Self-Monitoring         Goal: Patient agrees to check and record blood sugars randomly 1-2 times per day. (AC and 2 hours PP tracking /trending)    Start Date: 1/29/2025   Expected End Date: 2/26/2025   Barriers: No Barriers Identified   Note:    Reviewed benefits, techniques of self-monitoring blood glucose. Discussed appropriate timing and frequency to SMBG, education on parameters on when to notify provider. Discussed monitoring blood sugar before and 2 hours after the start of a meal helps to see how food and other factors affects blood sugar and advised patient to bring logs to all appts with PCP/Endocrinologist/Diabetes Care Specialist.  Handouts provided on Factors Affecting Blood sugar, Checking Your Blood Sugar, All About Blood  Glucose and glucose log to record and bring on next follow up visit.     Task: Reviewed the importance of self-monitoring blood glucose and keeping logs. Completed 1/29/2025              Follow Up Plan     Follow up in about 4 weeks (around 2/26/2025).    Today's care plan and follow up schedule was discussed with patient.  Tequila verbalized understanding of the care plan, goals, and agrees to follow up plan.        The patient was encouraged to communicate with his/her health care provider/physician and care team regarding his/her condition(s) and treatment.  I provided the patient with my contact information today and encouraged to contact me via phone or [facility] Patient Portal as needed.     Length of Visit   Total Time: 60 Minutes

## 2025-01-29 NOTE — LETTER
January 29, 2025      Elizabeth Schmidt, [facility]  [address] [facility] [place]  [facility] [place] [address]         Patient: Tequila Vázquez   MR Number: [id number]   YOB: 1975   Date of Visit: 1/29/2025       Dear Elizabeth Schmidt:    Thank you for referring Tequila for diabetes self-management education and support services. She was seen today for an initial visit.  She is scheduled for a follow up on 2/26/2025. She set the goals below to manage her diabetes better.  My complete note is in Epic.     Patient Outcomes:    A1c Status:   Lab Results   Component Value Date    HGBA1C 13.1 (H) 01/28/2025    HGBA1C 13.2 (H) 10/21/2024       Care Plan: Diabetes Management        Problem: Healthy Eating         Goal: Eat 3 meals daily with 30-45g (2-3) servings of Carbohydrate per meal.    Start Date: 1/29/2025   Expected End Date: 2/26/2025   Barriers: No Barriers Identified   Note:    Reviewed the sources and role of Carbohydrate, Protein, and Fat and how each nutrient impact blood sugar. Provided meal-planning instruction via food lists, plate method, food models, food labels. Reviewed micro/macronutrient effect on health management. Discussed carbohydrate counting and spacing. Reviewed principles of energy metabolism to assist weight and health management. Discussed strategies for healthier dining out and replacing sugary beverages with water and other noncaloric, sugar free options.   (Several handouts provided:  Planning a healthy meal, Building a Balanced Meal, Heart-Healthy Consistent Carbohydrate Nutrition Therapy, Weight loss)       Task: Review the importance of balancing carbohydrates with each meal using portion control techniques to count servings of carbohydrate and label reading to identify serving size and amount of total carbs per serving. Completed 1/29/2025        Task: Provided Sample plate method and reviewed the use of the plate to estimate amounts of carbohydrate per meal.  Completed 1/29/2025        Problem: Physical Activity and Exercise         Goal: Patient agrees to increase physical activity to a goal of 5 times per week for 30 minutes.    Start Date: 1/29/2025   Expected End Date: 2/26/2025   Barriers: No Barriers Identified   Note:    Discussed importance of daily physical activity with review of benefits, methods, guidelines, and precautions.  A handout on Diabetes and Physical Activity provided.      Task: Discussed role of physical activity on reducing insulin resistance and improvement in overall glycemic control. Completed 1/29/2025        Task: Discussed role of physical activity as it relates to weight loss Completed 1/29/2025        Task: Offered suggestions on how patient could increase their regular physical activity Completed 1/29/2025            Problem: Blood Glucose Self-Monitoring         Goal: Patient agrees to check and record blood sugars randomly 1-2 times per day. (AC and 2 hours PP tracking /trending)    Start Date: 1/29/2025   Expected End Date: 2/26/2025   Barriers: No Barriers Identified   Note:    Reviewed benefits, techniques of self-monitoring blood glucose. Discussed appropriate timing and frequency to SMBG, education on parameters on when to notify provider. Discussed monitoring blood sugar before and 2 hours after the start of a meal helps to see how food and other factors affects blood sugar and advised patient to bring logs to all appts with PCP/Endocrinologist/Diabetes Care Specialist.  Handouts provided on Factors Affecting Blood sugar, Checking Your Blood Sugar, All About Blood Glucose and glucose log to record and bring on next follow up visit.     Task: Reviewed the importance of self-monitoring blood glucose and keeping logs. Completed 1/29/2025          Follow up:   Tequila to attend medical appointments as scheduled  Tequila to update you on her DM education progress as needed      If you have questions, please do not hesitate to  call me. I look forward to providing additional education and support as needed.    Sincerely,  Della Newby RN  Diabetes Care and

## 2025-04-02 ENCOUNTER — OFFICE VISIT (OUTPATIENT)
Dept: FAMILY MEDICINE | Facility: CLINIC | Age: 50
End: 2025-04-02
Payer: COMMERCIAL

## 2025-04-02 VITALS
OXYGEN SATURATION: 99 % | HEART RATE: 80 BPM | SYSTOLIC BLOOD PRESSURE: 106 MMHG | HEIGHT: 65 IN | WEIGHT: 196.69 LBS | DIASTOLIC BLOOD PRESSURE: 79 MMHG | BODY MASS INDEX: 32.77 KG/M2

## 2025-04-02 DIAGNOSIS — E66.811 CLASS 1 OBESITY DUE TO EXCESS CALORIES WITH SERIOUS COMORBIDITY AND BODY MASS INDEX (BMI) OF 32.0 TO 32.9 IN ADULT: Chronic | ICD-10-CM

## 2025-04-02 DIAGNOSIS — E11.9 TYPE 2 DIABETES MELLITUS WITHOUT COMPLICATION, WITHOUT LONG-TERM CURRENT USE OF INSULIN: Primary | Chronic | ICD-10-CM

## 2025-04-02 DIAGNOSIS — I10 HYPERTENSION, UNSPECIFIED TYPE: Chronic | ICD-10-CM

## 2025-04-02 DIAGNOSIS — E66.09 CLASS 1 OBESITY DUE TO EXCESS CALORIES WITH SERIOUS COMORBIDITY AND BODY MASS INDEX (BMI) OF 32.0 TO 32.9 IN ADULT: Chronic | ICD-10-CM

## 2025-04-02 DIAGNOSIS — F41.9 ANXIETY: ICD-10-CM

## 2025-04-02 DIAGNOSIS — M32.9 SYSTEMIC LUPUS ERYTHEMATOSUS, UNSPECIFIED SLE TYPE, UNSPECIFIED ORGAN INVOLVEMENT STATUS: Chronic | ICD-10-CM

## 2025-04-02 DIAGNOSIS — J30.2 SEASONAL ALLERGIES: Chronic | ICD-10-CM

## 2025-04-02 DIAGNOSIS — F43.21 GRIEVING: ICD-10-CM

## 2025-04-02 DIAGNOSIS — E78.5 HYPERLIPIDEMIA, UNSPECIFIED HYPERLIPIDEMIA TYPE: Chronic | ICD-10-CM

## 2025-04-02 DIAGNOSIS — F32.A DEPRESSION, UNSPECIFIED DEPRESSION TYPE: ICD-10-CM

## 2025-04-02 LAB
EST. AVERAGE GLUCOSE BLD GHB EST-MCNC: 280 MG/DL
HBA1C MFR BLD HPLC: 11.4 %

## 2025-04-02 PROCEDURE — 83036 HEMOGLOBIN GLYCOSYLATED A1C: CPT | Mod: ,,, | Performed by: CLINICAL MEDICAL LABORATORY

## 2025-04-02 PROCEDURE — 3046F HEMOGLOBIN A1C LEVEL >9.0%: CPT | Mod: ,,, | Performed by: NURSE PRACTITIONER

## 2025-04-02 PROCEDURE — 3008F BODY MASS INDEX DOCD: CPT | Mod: ,,, | Performed by: NURSE PRACTITIONER

## 2025-04-02 PROCEDURE — 99214 OFFICE O/P EST MOD 30 MIN: CPT | Mod: ,,, | Performed by: NURSE PRACTITIONER

## 2025-04-02 PROCEDURE — 1160F RVW MEDS BY RX/DR IN RCRD: CPT | Mod: ,,, | Performed by: NURSE PRACTITIONER

## 2025-04-02 PROCEDURE — 1159F MED LIST DOCD IN RCRD: CPT | Mod: ,,, | Performed by: NURSE PRACTITIONER

## 2025-04-02 PROCEDURE — 3074F SYST BP LT 130 MM HG: CPT | Mod: ,,, | Performed by: NURSE PRACTITIONER

## 2025-04-02 PROCEDURE — 3078F DIAST BP <80 MM HG: CPT | Mod: ,,, | Performed by: NURSE PRACTITIONER

## 2025-04-02 RX ORDER — LOVASTATIN 20 MG/1
20 TABLET ORAL DAILY
Qty: 90 TABLET | Refills: 0 | Status: SHIPPED | OUTPATIENT
Start: 2025-04-02

## 2025-04-02 RX ORDER — FLUTICASONE PROPIONATE 50 MCG
1 SPRAY, SUSPENSION (ML) NASAL DAILY
Qty: 16 G | Refills: 2 | Status: SHIPPED | OUTPATIENT
Start: 2025-04-02

## 2025-04-02 RX ORDER — INSULIN GLARGINE 100 [IU]/ML
24 INJECTION, SOLUTION SUBCUTANEOUS DAILY
Qty: 7.2 ML | Refills: 2 | Status: SHIPPED | OUTPATIENT
Start: 2025-04-02 | End: 2025-07-01

## 2025-04-02 RX ORDER — BLOOD SUGAR DIAGNOSTIC
STRIP MISCELLANEOUS
Qty: 100 EACH | Refills: 5 | Status: SHIPPED | OUTPATIENT
Start: 2025-04-02

## 2025-04-02 NOTE — PATIENT INSTRUCTIONS
Lab obtained in clinic today, we will notify you of results and any necessary changes to plan of care   Refills on routine medications   Follow up on 5/5/25 and as needed   routine medication will be due again on 7/1/25    Continue use of daina CGM, bring reader in to each visit to allow for review of history       Increase Lantus insulin to 24 units daily     Fasting goal   post prandial goal <180   A1c goal <7  Recommend 45-60 grams of carbohydrates per meal   Follow plate method, avoiding sugar sweetened drinks and fruit juice; avoid foods high in concentrated sugars   Do not skip meals  Exercise 30 minutes daily for at least 5 days per week

## 2025-04-02 NOTE — PROGRESS NOTES
Clinic note     Patient name: Tequila Vázquez is a 50 y.o. female   Chief compliant   Chief Complaint   Patient presents with    Follow-up     Here for follow up. Pt has CGM on and her reader with her.     Health Maintenance     TETANUS VACCINE Never done  Shingles Vaccine(1 of 2) Never done: declined  Diabetic Eye Exam due on 12/01/2021: pt states she will make appt  Foot Exam due on 04/13/2023  COVID-19 Vaccine(4 - 2024-25 season) due on 09/01/2024: declined  Hemoglobin A1c due on 04/28/2025: not due yet         Subjective     History of present illness   In clinic for routine follow up and medication refills   She has had increased stress over the last few weeks, she now has custody of her three year old grandchild    Last A1c 13.1 on 1/28/25, Lantus was increased to 20 units daily, instructed to follow up in clinic two weeks with bg log; she did not keep follow up as recommended   A1c 13.2 on 10/21/2024, was instructed to increase basaglar insulin to 16 units, keep bg log and follow up in clinic in two weeks, she did not keep follow up as recommended   Checking blood glucose: Summer CGM   TIR 24%  TAR 76%  TBR 0%  7 day average 224  14 day average  223  30 day average 241  She has been trying to follow the plate method for her diet     She was seen by Sushant RN CDE for diabetic education on 1/29/2025     Seeing Dr Camacho for pain management  Psychiatry: Neetu, APMHNP      Hx of right eyelid ptosis and weakness of RUE and RLE, myasthenia gravis, SLE,  recently seen by Monique P neurology,  Dr Desir and ANDREA Rogers NP-C at  neurology [facility]  She has been receiving  IVIG infusions twice monthly                 Social History[1]    Review of patient's allergies indicates:   Allergen Reactions    Aspirin Hives       Past Medical History:   Diagnosis Date    Asthma     Diabetes mellitus     History of colonic polyps 04/16/2024    Late syphilis, 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 Added automatically from request for surgery [id number]    Systemic lupus erythematosus, organ or system involvement unspecified        Past Surgical History:   Procedure Laterality Date    DILATION AND CURETTAGE OF UTERUS  2000    preformed at [facility]    HYSTERECTOMY      MUSCLE BIOPSY      OOPHORECTOMY      pt has had one ovary removed, unsure side    TUBAL LIGATION  1999    [facility]        Family History   Problem Relation Name Age of Onset    Asthma Mother      Coronary artery disease Mother      Diabetes Father      Heart disease Father      Diabetes Sister      Cancer Sister          Breast    Breast cancer Sister      Diabetes Brother         Current Medications[2]    Review of Systems   Constitutional:  Negative for appetite change, chills, fatigue, fever and unexpected weight change.   Respiratory:  Negative for cough and shortness of breath.    Cardiovascular:  Negative for chest pain, palpitations and leg swelling.   Gastrointestinal:  Negative for abdominal pain, change in bowel habit, constipation, diarrhea, nausea and vomiting.   Genitourinary:  Negative for dysuria and frequency.   Musculoskeletal:  Negative for arthralgias, gait problem and myalgias.   Neurological:  Negative for dizziness, syncope, light-headedness and headaches.   Psychiatric/Behavioral:  Negative for dysphoric mood and sleep disturbance. The patient is not nervous/anxious.         Increased stress       Objective     /79 (BP Location: Right arm, Patient Position: Sitting)   Pulse 80   Ht 5' 5" (1.651 m)   Wt 89.2 kg (196 lb 11.2 oz)   SpO2 99%   BMI 32.73 kg/m²     Physical Exam   Constitutional: She is oriented to person, place, and time. She appears obese. No distress.   HENT:   Head: Atraumatic.   Mouth/Throat: Mucous membranes are moist.   Eyes:   Right eye ptosis      Cardiovascular: Normal rate and regular rhythm. Pulmonary:      " Effort: Pulmonary effort is normal. No respiratory distress.      Breath sounds: Normal breath sounds. No wheezing, rhonchi or rales.     Abdominal: Soft. Bowel sounds are normal. She exhibits no distension. There is no abdominal tenderness.   Musculoskeletal:         General: Normal range of motion.      Cervical back: Neck supple.      Right lower leg: No edema.      Left lower leg: No edema.   Neurological: She is alert and oriented to person, place, and time. Gait normal.   Skin: Skin is warm and dry.   Psychiatric: Her behavior is normal. Mood normal.       Lab Results   Component Value Date    WBC 3.92 (L) 01/23/2025    HGB 13.5 01/23/2025    HCT 39.3 01/23/2025    MCV 89.5 01/23/2025     01/23/2025       CMP  Sodium   Date Value Ref Range Status   01/23/2025 133 (L) 136 - 145 mmol/L Final     Potassium   Date Value Ref Range Status   01/23/2025 4.6 3.5 - 5.1 mmol/L Final     Chloride   Date Value Ref Range Status   01/23/2025 101 98 - 107 mmol/L Final     CO2   Date Value Ref Range Status   01/23/2025 23 22 - 29 mmol/L Final     Glucose   Date Value Ref Range Status   01/23/2025 426 (H) 74 - 100 mg/dL Final     BUN   Date Value Ref Range Status   01/23/2025 11 10 - 20 mg/dL Final     Creatinine   Date Value Ref Range Status   01/23/2025 1.06 (H) 0.55 - 1.02 mg/dL Final     Calcium   Date Value Ref Range Status   01/23/2025 9.3 8.4 - 10.2 mg/dL Final     Total Protein   Date Value Ref Range Status   01/23/2025 7.6 6.4 - 8.3 g/dL Final     Albumin   Date Value Ref Range Status   01/23/2025 3.4 (L) 3.5 - 5.0 g/dL Final     Bilirubin, Total   Date Value Ref Range Status   01/23/2025 0.4 <=1.5 mg/dL Final     Alk Phos   Date Value Ref Range Status   01/23/2025 113 40 - 150 U/L Final     AST   Date Value Ref Range Status   01/23/2025 20 5 - 34 U/L Final     ALT   Date Value Ref Range Status   01/23/2025 16 <=55 U/L Final     Anion Gap   Date Value Ref Range Status   01/23/2025 14 7 - 16 mmol/L Final     eGFR  "   Date Value Ref Range Status   08/13/2021 67 >=60 mL/min/1.73m² Final     eGFR   Date Value Ref Range Status   04/13/2022 61 >=60 mL/min/1.73m² Final     Lab Results   Component Value Date    TSH 0.766 08/13/2021     Lab Results   Component Value Date    CHOL 216 (H) 07/11/2024    CHOL 232 (H) 06/12/2023    CHOL 216 (H) 04/13/2022     Lab Results   Component Value Date    HDL 51 07/11/2024    HDL 67 (H) 06/12/2023    HDL 58 04/13/2022     Lab Results   Component Value Date    LDLCALC 133 07/11/2024    LDLCALC 143 06/12/2023    LDLCALC 137 04/13/2022     Lab Results   Component Value Date    TRIG 162 (H) 07/11/2024    TRIG 112 06/12/2023    TRIG 106 04/13/2022     Lab Results   Component Value Date    CHOLHDL 4.2 07/11/2024    CHOLHDL 3.5 06/12/2023    CHOLHDL 3.7 04/13/2022     Lab Results   Component Value Date    HGBA1C 13.1 (H) 01/28/2025       Health Maintenance Due   Topic Date Due    TETANUS VACCINE  Never done    Shingles Vaccine (1 of 2) Never done    Diabetic Eye Exam  12/01/2021    Foot Exam  04/13/2023    COVID-19 Vaccine (4 - 2024-25 season) 09/01/2024    Hemoglobin A1c  04/28/2025         Health Maintenance Topics with due status: Not Due       Topic Last Completion Date    Colorectal Cancer Screening 04/16/2024    Diabetes Urine Screening 07/11/2024    Lipid Panel 07/11/2024    Mammogram 10/23/2024    RSV Vaccine (Age 60+ and Pregnant patients) Not Due         Assessment and Plan   Type 2 diabetes mellitus without complication, without long-term current use of insulin  -     insulin glargine U-100, Lantus, (BASAGLAR KWIKPEN U-100 INSULIN) 100 unit/mL (3 mL) InPn pen; Inject 24 Units into the skin once daily.  Dispense: 7.2 mL; Refill: 2  -     pen needle, diabetic 32 gauge x 1/4" Ndle; Use one needle to inject insulin daily as prescribed  Dispense: 100 each; Refill: 5  -     Hemoglobin A1C; Future; Expected date: 04/02/2025    Hypertension, unspecified type  -     lovastatin (MEVACOR) " 20 MG tablet; Take 1 tablet (20 mg total) by mouth once daily.  Dispense: 90 tablet; Refill: 0    Systemic lupus erythematosus, unspecified SLE type, unspecified organ involvement status    Hyperlipidemia, unspecified hyperlipidemia type    Anxiety  Comments:  Psychiatry Neetu MANN    Depression, unspecified depression type  Comments:  Psychiatry Neetu GEORGESKYRIE    Grieving  Comments:  Psychiatry Neetu GEORGESKYRIE    Seasonal allergies  -     fluticasone propionate (FLONASE) 50 mcg/actuation nasal spray; 1 spray (50 mcg total) by Each Nostril route once daily.  Dispense: 16 g; Refill: 2    Class 1 obesity due to excess calories with serious comorbidity and body mass index (BMI) of 32.0 to 32.9 in adult          Patient Instructions  Patient Instructions   Lab obtained in clinic today, we will notify you of results and any necessary changes to plan of care   Refills on routine medications   Follow up on 5/5/25 and as needed   routine medication will be due again on 7/1/25    Continue use of daina CGM, bring reader in to each visit to allow for review of history       Increase Lantus insulin to 24 units daily     Fasting goal   post prandial goal <180   A1c goal <7  Recommend 45-60 grams of carbohydrates per meal   Follow plate method, avoiding sugar sweetened drinks and fruit juice; avoid foods high in concentrated sugars   Do not skip meals  Exercise 30 minutes daily for at least 5 days per week     Diagnosis, risks, benefits, and side effects of medications and treatments were discussed. Pt advised to call or follow up with any new or worsening symptoms.  Go to ED for any urgent complications.  All questions  were answered to the satisfaction of the patient who verbalized understanding and agreement to treatment plan.   Plan of care was determined through shared decision making with the patient.                                 [1]   Social History  Tobacco Use    Smoking status: Never     Passive exposure:  "Never    Smokeless tobacco: Current     Types: Snuff   Substance Use Topics    Alcohol use: Never    Drug use: Never   [2]   Current Outpatient Medications:     albuterol (PROVENTIL/VENTOLIN HFA) 90 mcg/actuation inhaler, Inhale 2 puffs into the lungs every 6 (six) hours as needed for Wheezing. Rescue, Disp: 18 g, Rfl: 2    ARIPiprazole (ABILIFY) 5 MG Tab, Take 5 mg by mouth once daily., Disp: , Rfl:     folic acid (FOLVITE) 1 MG tablet, Take 1 tablet by mouth once daily., Disp: , Rfl:     methotrexate 5 MG tablet, Take 12.5mg on Saturday and 12.5mg on Sunday max 25mg weekly, Disp: , Rfl:     EASY TOUCH 31 gauge x 3/16" Ndle, USE one needle TO INJECT insulin ONCE DAILY as prescribed, Disp: , Rfl:     fluticasone propionate (FLONASE) 50 mcg/actuation nasal spray, 1 spray (50 mcg total) by Each Nostril route once daily., Disp: 16 g, Rfl: 2    insulin glargine U-100, Lantus, (BASAGLAR KWIKPEN U-100 INSULIN) 100 unit/mL (3 mL) InPn pen, Inject 24 Units into the skin once daily., Disp: 7.2 mL, Rfl: 2    lancets (E-Z JECT LANCETS) 32 gauge Misc, 100 lancets by Misc.(Non-Drug; Combo Route) route. USE TO CHECK GLUCOSE ONCE DAILY, Disp: , Rfl:     lovastatin (MEVACOR) 20 MG tablet, Take 1 tablet (20 mg total) by mouth once daily., Disp: 90 tablet, Rfl: 0    PANZYGA 10 % Soln, Inject into the vein once a week., Disp: , Rfl:     pen needle, diabetic 32 gauge x 1/4" Ndle, Use one needle to inject insulin daily as prescribed, Disp: 100 each, Rfl: 5    TRUEPLUS PEN NEEDLE 32 gauge x 5/32" Ndle, SMARTSIG:injection Daily, Disp: , Rfl:     "

## 2025-04-07 ENCOUNTER — RESULTS FOLLOW-UP (OUTPATIENT)
Dept: FAMILY MEDICINE | Facility: CLINIC | Age: 50
End: 2025-04-07

## 2025-04-22 LAB
LEFT EYE DM RETINOPATHY: NEGATIVE
RIGHT EYE DM RETINOPATHY: NEGATIVE

## 2025-05-05 ENCOUNTER — PATIENT OUTREACH (OUTPATIENT)
Facility: HOSPITAL | Age: 50
End: 2025-05-05
Payer: COMMERCIAL

## 2025-05-05 ENCOUNTER — OFFICE VISIT (OUTPATIENT)
Dept: FAMILY MEDICINE | Facility: CLINIC | Age: 50
End: 2025-05-05
Payer: COMMERCIAL

## 2025-05-05 VITALS
HEART RATE: 65 BPM | WEIGHT: 198.19 LBS | BODY MASS INDEX: 33.02 KG/M2 | HEIGHT: 65 IN | SYSTOLIC BLOOD PRESSURE: 122 MMHG | DIASTOLIC BLOOD PRESSURE: 83 MMHG | OXYGEN SATURATION: 100 %

## 2025-05-05 DIAGNOSIS — E11.9 TYPE 2 DIABETES MELLITUS WITHOUT COMPLICATION, WITHOUT LONG-TERM CURRENT USE OF INSULIN: Primary | Chronic | ICD-10-CM

## 2025-05-05 DIAGNOSIS — I10 HYPERTENSION, UNSPECIFIED TYPE: Chronic | ICD-10-CM

## 2025-05-05 PROCEDURE — 3008F BODY MASS INDEX DOCD: CPT | Mod: ,,, | Performed by: NURSE PRACTITIONER

## 2025-05-05 PROCEDURE — 1160F RVW MEDS BY RX/DR IN RCRD: CPT | Mod: ,,, | Performed by: NURSE PRACTITIONER

## 2025-05-05 PROCEDURE — 3046F HEMOGLOBIN A1C LEVEL >9.0%: CPT | Mod: ,,, | Performed by: NURSE PRACTITIONER

## 2025-05-05 PROCEDURE — 99214 OFFICE O/P EST MOD 30 MIN: CPT | Mod: ,,, | Performed by: NURSE PRACTITIONER

## 2025-05-05 PROCEDURE — 3079F DIAST BP 80-89 MM HG: CPT | Mod: ,,, | Performed by: NURSE PRACTITIONER

## 2025-05-05 PROCEDURE — 1159F MED LIST DOCD IN RCRD: CPT | Mod: ,,, | Performed by: NURSE PRACTITIONER

## 2025-05-05 PROCEDURE — 3074F SYST BP LT 130 MM HG: CPT | Mod: ,,, | Performed by: NURSE PRACTITIONER

## 2025-05-05 RX ORDER — INSULIN GLARGINE 100 [IU]/ML
28 INJECTION, SOLUTION SUBCUTANEOUS DAILY
Qty: 8.4 ML | Refills: 2 | Status: SHIPPED | OUTPATIENT
Start: 2025-05-05 | End: 2025-08-03

## 2025-05-05 NOTE — PATIENT INSTRUCTIONS
Continue use of daina CGM, bring reader in to each visit to allow for review of history         Increase Lantus insulin to 28 units daily (new rx sent to pharmacy)   Bring your reader by clinic in one week for me to review and make dose adjustments if needed      Fasting goal   post prandial goal <180   A1c goal <7  Recommend 45-60 grams of carbohydrates per meal   Follow plate method, avoiding sugar sweetened drinks and fruit juice; avoid foods high in concentrated sugars   Do not skip meals  Exercise 30 minutes daily for at least 5 days per week

## 2025-05-05 NOTE — LETTER
AUTHORIZATION FOR RELEASE OF   CONFIDENTIAL INFORMATION    Dear Dr. Li ,    We are seeing Tequila Vázquez, date of birth 1975, in the clinic at [facility]. Elizabeth Schmidt FNP is the patient's PCP. Tequila Vázquez has an outstanding lab/procedure at the time we reviewed her chart. In order to help keep her health information updated, she has authorized us to request the following medical record(s):        (  )  MAMMOGRAM                                      (  )  COLONOSCOPY      (  )  PAP SMEAR                                          (  )  OUTSIDE LAB RESULTS     (  )  DEXA SCAN                                          ( X )  EYE EXAM            (  )  FOOT EXAM                                          (  )  ENTIRE RECORD     (  )  OUTSIDE IMMUNIZATIONS                 (  )  _______________         Please fax records to Lucinda Portillo LPN, Care Coordinator at [phone].      If you have any questions, please contact Lucinda at [phone]           Patient Name: Tequila Vázquez  : 1975  Patient Phone #: [phone]           Tequila Vázquez  MRN: [id number]  : 1975  Age: 49 y.o.  Sex: female         Patient/Legal Guardian Signature  This signature was collected at 2025    sg       _______________________________   Printed Name/Relationship to Patient      Consent for Examination and Treatment: I hereby authorize the providers and employees of [facility] ([facility]) to provide medical treatment/services which includes, but is not limited to, performing and administering tests and diagnostic procedures that are deemed necessary, including, but not limited to, imaging examinations, blood tests and other laboratory procedures as may be required by the hospital, clinic, or may be ordered by my physician(s) or persons working under the general and/or special instructions of my physician(s).      I understand and agree that this consent covers all  authorized persons, including but not limited to physicians, residents, nurse practitioners, physicians' assistants, specialists, consultants, student nurses, and independently contracted physicians, who are called upon by the physician in charge, to carry out the diagnostic procedures and medical or surgical treatment.     I hereby authorize [facility] to retain or dispose of any specimens or tissue, should there be such remaining from any test or procedure.     I hereby authorize and give consent for [facility] providers and employees to take photographs, images or videotapes of such diagnostic, surgical or treatment procedures of Patient as may be required by [facility] or as may be ordered by a physician. I further acknowledge and agree that [facility] may use cameras or other devices for patient monitoring.     I am aware that the practice of medicine is not an exact science, and I acknowledge that no guarantees have been made to me as to the outcome of any tests, procedures or treatment.     Authorization for Release of Information: I understand that my insurance company and/or their agents may need information necessary to make determinations about payment/reimbursement. I hereby provide authorization to release to all insurance companies, their successors, assignees, other parties with whom they may have contracted, or others acting on their behalf, that are involved with payment for any hospital and/or clinic charges incurred by the patient, any information that they request and deem necessary for payment/reimbursement, and/or quality review.  I further authorize the release of my health information to physicians or other health care practitioners on staff who are involved in my health care now and in the future, and to other health care providers, entities, or institutions for the purpose of my continued care and treatment, including referrals.     REGISTRATION AUTHORIZATION

## 2025-05-05 NOTE — PROGRESS NOTES
Clinic note     Patient name: Tequila Vázquez is a 50 y.o. female   Chief compliant   Chief Complaint   Patient presents with    Follow-up     One month follow up. Had Cgm reader with her.     Health Maintenance     TETANUS VACCINE Never done  Shingles Vaccine(1 of 2) Never done  Diabetic Eye Exam due on 12/01/2021: [facility] [place] 20[facility]  Foot Exam due on 04/13/2023  COVID-19 Vaccine(4 - 2024-25 season) due on 09/01/2024         Subjective     History of present illness   In clinic for follow up on DM     Last A1c 11.4   Checking blood glucose: Summer CGM   TIR  34%  TAR  66%  TBR  0%  7 day average 202  14 day average  206  She has been trying to follow the plate method for her diet      She was seen by Sushant RN CDE for diabetic education on 1/29/2025     Seeing Dr Camacho for pain management  Psychiatry: Neetu, JOHNATHAN      Hx of right eyelid ptosis and weakness of RUE and RLE, myasthenia gravis, SLE,  recently seen by [facility] neurology,  Dr Desir and ANDREA Rogers, NP-C at  neurology [facility]  She has been receiving  IVIG infusions twice monthly (infusions are done in her home) follow up in [place] neurology scheduled for July                    Social History[1]    Review of patient's allergies indicates:   Allergen Reactions    Aspirin Hives       Past Medical History:   Diagnosis Date    Asthma     Diabetes mellitus     History of colonic polyps 04/16/2024    Late syphilis, 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 Added automatically from request for surgery [id number]    Systemic lupus erythematosus, organ or system involvement unspecified        Past Surgical History:   Procedure Laterality Date    DILATION AND CURETTAGE OF UTERUS  2000    preformed at [facility]    HYSTERECTOMY      MUSCLE BIOPSY      OOPHORECTOMY      pt has had one ovary removed, unsure side    TUBAL LIGATION  1999    VIVEK Carlos     "    Family History   Problem Relation Name Age of Onset    Asthma Mother      Coronary artery disease Mother      Diabetes Father      Heart disease Father      Diabetes Sister      Cancer Sister          Breast    Breast cancer Sister      Diabetes Brother         Current Medications[2]    Review of Systems   Constitutional:  Negative for appetite change, chills, fatigue, fever and unexpected weight change.   Respiratory:  Negative for cough and shortness of breath.    Cardiovascular:  Negative for chest pain, palpitations and leg swelling.   Gastrointestinal:  Negative for abdominal pain, change in bowel habit, constipation, diarrhea, nausea and vomiting.   Genitourinary:  Negative for dysuria and frequency.   Musculoskeletal:  Negative for arthralgias, gait problem and myalgias.   Neurological:  Negative for dizziness, syncope, light-headedness and headaches.   Psychiatric/Behavioral:  Negative for dysphoric mood and sleep disturbance. The patient is not nervous/anxious.        Objective     /83 (BP Location: Right arm, Patient Position: Sitting)   Pulse 65   Ht 5' 5" (1.651 m)   Wt 89.9 kg (198 lb 3.2 oz)   SpO2 100%   BMI 32.98 kg/m²     Physical Exam   Constitutional: She is oriented to person, place, and time. She appears obese. No distress.   HENT:   Head: Normocephalic.   Eyes:   Right eye ptosis    Cardiovascular: Normal rate and regular rhythm. Pulmonary:      Effort: Pulmonary effort is normal.      Breath sounds: Normal breath sounds.     Abdominal: Soft. There is no abdominal tenderness.   Musculoskeletal:         General: Normal range of motion.      Cervical back: Neck supple.      Right lower leg: No edema.      Left lower leg: No edema.   Neurological: She is alert and oriented to person, place, and time. Gait normal.   Skin: Skin is warm and dry.   Psychiatric: Her behavior is normal. Mood normal.       Lab Results   Component Value Date    WBC 3.92 (L) 01/23/2025    HGB 13.5 01/23/2025 "    HCT 39.3 01/23/2025    MCV 89.5 01/23/2025     01/23/2025       CMP  Sodium   Date Value Ref Range Status   01/23/2025 133 (L) 136 - 145 mmol/L Final     Potassium   Date Value Ref Range Status   01/23/2025 4.6 3.5 - 5.1 mmol/L Final     Chloride   Date Value Ref Range Status   01/23/2025 101 98 - 107 mmol/L Final     CO2   Date Value Ref Range Status   01/23/2025 23 22 - 29 mmol/L Final     Glucose   Date Value Ref Range Status   01/23/2025 426 (H) 74 - 100 mg/dL Final     BUN   Date Value Ref Range Status   01/23/2025 11 10 - 20 mg/dL Final     Creatinine   Date Value Ref Range Status   01/23/2025 1.06 (H) 0.55 - 1.02 mg/dL Final     Calcium   Date Value Ref Range Status   01/23/2025 9.3 8.4 - 10.2 mg/dL Final     Total Protein   Date Value Ref Range Status   01/23/2025 7.6 6.4 - 8.3 g/dL Final     Albumin   Date Value Ref Range Status   01/23/2025 3.4 (L) 3.5 - 5.0 g/dL Final     Bilirubin, Total   Date Value Ref Range Status   01/23/2025 0.4 <=1.5 mg/dL Final     Alk Phos   Date Value Ref Range Status   01/23/2025 113 40 - 150 U/L Final     AST   Date Value Ref Range Status   01/23/2025 20 5 - 34 U/L Final     ALT   Date Value Ref Range Status   01/23/2025 16 <=55 U/L Final     Anion Gap   Date Value Ref Range Status   01/23/2025 14 7 - 16 mmol/L Final     eGFR    Date Value Ref Range Status   08/13/2021 67 >=60 mL/min/1.73m² Final     eGFR   Date Value Ref Range Status   04/13/2022 61 >=60 mL/min/1.73m² Final     Lab Results   Component Value Date    TSH 0.766 08/13/2021     Lab Results   Component Value Date    CHOL 216 (H) 07/11/2024    CHOL 232 (H) 06/12/2023    CHOL 216 (H) 04/13/2022     Lab Results   Component Value Date    HDL 51 07/11/2024    HDL 67 (H) 06/12/2023    HDL 58 04/13/2022     Lab Results   Component Value Date    LDLCALC 133 07/11/2024    LDLCALC 143 06/12/2023    LDLCALC 137 04/13/2022     Lab Results   Component Value Date    TRIG 162 (H) 07/11/2024    TRIG 112  06/12/2023    TRIG 106 04/13/2022     Lab Results   Component Value Date    CHOLHDL 4.2 07/11/2024    CHOLHDL 3.5 06/12/2023    CHOLHDL 3.7 04/13/2022     Lab Results   Component Value Date    HGBA1C 11.4 (H) 04/02/2025       Health Maintenance Due   Topic Date Due    TETANUS VACCINE  Never done    Shingles Vaccine (1 of 2) Never done    Diabetic Eye Exam  12/01/2021    Foot Exam  04/13/2023    COVID-19 Vaccine (4 - 2024-25 season) 09/01/2024         Health Maintenance Topics with due status: Not Due       Topic Last Completion Date    Colorectal Cancer Screening 04/16/2024    Diabetes Urine Screening 07/11/2024    Lipid Panel 07/11/2024    Mammogram 10/23/2024    Hemoglobin A1c 04/02/2025    RSV Vaccine (Age 60+ and Pregnant patients) Not Due         Assessment and Plan   Type 2 diabetes mellitus without complication, without long-term current use of insulin  -     insulin glargine U-100, Lantus, (BASAGLAR KWIKPEN U-100 INSULIN) 100 unit/mL (3 mL) InPn pen; Inject 28 Units into the skin once daily.  Dispense: 8.4 mL; Refill: 2    Hypertension, unspecified type          Patient Instructions  Patient Instructions   Continue use of daina CGM, bring reader in to each visit to allow for review of history         Increase Lantus insulin to 28 units daily (new rx sent to pharmacy)   Bring your reader by clinic in one week for me to review and make dose adjustments if needed      Fasting goal   post prandial goal <180   A1c goal <7  Recommend 45-60 grams of carbohydrates per meal   Follow plate method, avoiding sugar sweetened drinks and fruit juice; avoid foods high in concentrated sugars   Do not skip meals  Exercise 30 minutes daily for at least 5 days per week                                     [1]   Social History  Tobacco Use    Smoking status: Never     Passive exposure: Never    Smokeless tobacco: Current     Types: Snuff   Substance Use Topics    Alcohol use: Never    Drug use: Never   [2]   Current  "Outpatient Medications:     albuterol (PROVENTIL/VENTOLIN HFA) 90 mcg/actuation inhaler, Inhale 2 puffs into the lungs every 6 (six) hours as needed for Wheezing. Rescue, Disp: 18 g, Rfl: 2    ARIPiprazole (ABILIFY) 5 MG Tab, Take 5 mg by mouth once daily., Disp: , Rfl:     EASY TOUCH 31 gauge x 3/16" Ndle, USE one needle TO INJECT insulin ONCE DAILY as prescribed, Disp: , Rfl:     fluticasone propionate (FLONASE) 50 mcg/actuation nasal spray, 1 spray (50 mcg total) by Each Nostril route once daily., Disp: 16 g, Rfl: 2    folic acid (FOLVITE) 1 MG tablet, Take 1 tablet by mouth once daily., Disp: , Rfl:     lovastatin (MEVACOR) 20 MG tablet, Take 1 tablet (20 mg total) by mouth once daily., Disp: 90 tablet, Rfl: 0    methotrexate 5 MG tablet, Take 12.5mg on Saturday and 12.5mg on Sunday max 25mg weekly, Disp: , Rfl:     PANZYGA 10 % Soln, Inject into the vein once a week., Disp: , Rfl:     insulin glargine U-100, Lantus, (BASAGLAR KWIKPEN U-100 INSULIN) 100 unit/mL (3 mL) InPn pen, Inject 28 Units into the skin once daily., Disp: 8.4 mL, Rfl: 2    lancets (E-Z JECT LANCETS) 32 gauge Misc, 100 lancets by Misc.(Non-Drug; Combo Route) route. USE TO CHECK GLUCOSE ONCE DAILY, Disp: , Rfl:     pen needle, diabetic 32 gauge x 1/4" Ndle, Use one needle to inject insulin daily as prescribed, Disp: 100 each, Rfl: 5    TRUEPLUS PEN NEEDLE 32 gauge x 5/32" Ndle, SMARTSIG:injection Daily, Disp: , Rfl:     "

## 2025-05-05 NOTE — PROGRESS NOTES
Population Health Chart Review & Patient Outreach Details      Further Action Needed If Patient Returns Outreach:        Health Maintenance Due   Topic Date Due    TETANUS VACCINE  Never done    Shingles Vaccine (1 of 2) Never done    Diabetic Eye Exam  2021    Foot Exam  2023    COVID-19 Vaccine ( season) 2024          Updates Requested / Reviewed:     [x]  Care Everywhere    [x]     []  External Sources ([facility], [facility], [facility], etc.)    [] [facility]   [] [facility]   [] Other:    [x]  Care Team Updated   []  Removed  or Duplicate Orders   []  Immunization Reconciliation Completed / Queried    [] [place]   [] [place]   [] [place]   [] [place]      Health Maintenance Topics Addressed and Outreach Outcomes / Actions Taken:             Breast Cancer Screening []  Mammogram Order Placed    []  Mammogram Screening Scheduled    []  External Records Requested & Care Team Updated if Applicable    []  External Records Uploaded & Care Team Updated if Applicable    []  Pt Declined Scheduling Mammogram    []  Pt Will Schedule with External Provider / Order Routed & Care Team Updated if Applicable              Cervical Cancer Screening []  Pap Smear Scheduled in Primary Care or OBGYN    []  External Records Requested & Care Team Updated if Applicable       []  External Records Uploaded, Care Team Updated, & History Updated if Applicable    []  Patient Declined Scheduling Pap Smear    []  Patient Will Schedule with External Provider & Care Team Updated if Applicable                  Colorectal Cancer Screening []  Colonoscopy Case Request / Referral / Home Test Order Placed    []  External Records Requested & Care Team Updated if Applicable    []  External Records Uploaded, Care Team Updated, & History Updated if Applicable    []  Patient Declined Completing Colon Cancer Screening    []  Patient Will Schedule with External Provider & Care Team Updated if Applicable    []  Fit Kit Mailed  (add the SmartPhrase under additional notes)    []  Reminded Patient to Complete Home Test                Diabetic Eye Exam []  Eye Exam Screening Order Placed    []  Eye Camera Scheduled or Optometry/Ophthalmology Referral Placed    [x]  External Records Requested & Care Team Updated if Applicable    []  External Records Uploaded, Care Team Updated, & History Updated if Applicable    []  Patient Declined Scheduling Eye Exam    []  Patient Will Schedule with External Provider & Care Team Updated if Applicable             Blood Pressure Control []  Primary Care Follow Up Visit Scheduled     []  Remote Blood Pressure Reading Captured    []  Patient Declined Remote Reading or Scheduling Appt - Escalated to PCP    []  Patient Will Call Back or Send Portal Message with Reading                 HbA1c & Other Labs []  Overdue Lab(s) Ordered    []  Overdue Lab(s) Scheduled    []  External Records Uploaded & Care Team Updated if Applicable    []  Primary Care Follow Up Visit Scheduled     []  Reminded Patient to Complete A1c Home Test    []  Patient Declined Scheduling Labs or Will Call Back to Schedule    []  Patient Will Schedule with External Provider / Order Routed, & Care Team Updated if Applicable           Primary Care Appointment []  Primary Care Appt Scheduled    []  Patient Declined Scheduling or Will Call Back to Schedule    []  Pt Established with External Provider, Updated Care Team, & Informed Pt to Notify Payor if Applicable           Medication Adherence /    Statin Use []  Primary Care Appointment Scheduled    []  Patient Reminded to  Prescription    []  Patient Declined, Provider Notified if Needed    []  Sent Provider Message to Review to Evaluate Pt for Statin, Add Exclusion Dx Codes, Document   Exclusion in Problem List, Change Statin Intensity Level to Moderate or High Intensity if Applicable                Osteoporosis Screening []  Dexa Order Placed    []  Dexa Appointment Scheduled    []   External Records Requested & Care Team Updated    []  External Records Uploaded, Care Team Updated, & History Updated if Applicable    []  Patient Declined Scheduling Dexa or Will Call Back to Schedule    []  Patient Will Schedule with External Provider / Order Routed & Care Team Updated if Applicable       Additional Notes:

## 2025-05-13 ENCOUNTER — OFFICE VISIT (OUTPATIENT)
Dept: FAMILY MEDICINE | Facility: CLINIC | Age: 50
End: 2025-05-13
Payer: COMMERCIAL

## 2025-05-13 VITALS
SYSTOLIC BLOOD PRESSURE: 103 MMHG | WEIGHT: 199 LBS | TEMPERATURE: 98 F | DIASTOLIC BLOOD PRESSURE: 68 MMHG | BODY MASS INDEX: 31.98 KG/M2 | OXYGEN SATURATION: 97 % | HEIGHT: 66 IN | RESPIRATION RATE: 20 BRPM | HEART RATE: 81 BPM

## 2025-05-13 DIAGNOSIS — H10.9 CONJUNCTIVITIS OF RIGHT EYE, UNSPECIFIED CONJUNCTIVITIS TYPE: Primary | ICD-10-CM

## 2025-05-13 DIAGNOSIS — H57.89 EYE IRRITATION: ICD-10-CM

## 2025-05-13 PROCEDURE — 1160F RVW MEDS BY RX/DR IN RCRD: CPT | Mod: ,,, | Performed by: NURSE PRACTITIONER

## 2025-05-13 PROCEDURE — 3046F HEMOGLOBIN A1C LEVEL >9.0%: CPT | Mod: ,,, | Performed by: NURSE PRACTITIONER

## 2025-05-13 PROCEDURE — 1159F MED LIST DOCD IN RCRD: CPT | Mod: ,,, | Performed by: NURSE PRACTITIONER

## 2025-05-13 PROCEDURE — 3008F BODY MASS INDEX DOCD: CPT | Mod: ,,, | Performed by: NURSE PRACTITIONER

## 2025-05-13 PROCEDURE — 3074F SYST BP LT 130 MM HG: CPT | Mod: ,,, | Performed by: NURSE PRACTITIONER

## 2025-05-13 PROCEDURE — 3078F DIAST BP <80 MM HG: CPT | Mod: ,,, | Performed by: NURSE PRACTITIONER

## 2025-05-13 PROCEDURE — 99213 OFFICE O/P EST LOW 20 MIN: CPT | Mod: ,,, | Performed by: NURSE PRACTITIONER

## 2025-05-13 RX ORDER — CIPROFLOXACIN HYDROCHLORIDE 3 MG/ML
1 SOLUTION/ DROPS OPHTHALMIC
Qty: 5 ML | Refills: 0 | Status: SHIPPED | OUTPATIENT
Start: 2025-05-13

## 2025-05-13 RX ORDER — INSULIN GLARGINE-YFGN 100 [IU]/ML
INJECTION, SOLUTION SUBCUTANEOUS
COMMUNITY
Start: 2025-05-05

## 2025-05-13 NOTE — PROGRESS NOTES
"Subjective:       Patient ID: Tequila Vázquez is a 50 y.o. female.    Chief Complaint: Eye Problem (Right eye discomfort. Patient states "I think I have another stye")    Right eye irritation and drainage  Review of Systems   Constitutional:  Negative for appetite change, fatigue and fever.   HENT:  Negative for nasal congestion, ear pain and sore throat.    Eyes:  Positive for discharge and redness. Negative for photophobia, pain, itching and visual disturbance.   Respiratory:  Negative for cough and shortness of breath.    Cardiovascular:  Negative for chest pain and leg swelling.   Gastrointestinal:  Negative for abdominal pain, change in bowel habit, nausea and vomiting.   Musculoskeletal:  Negative for back pain, gait problem and neck pain.   Integumentary:  Negative for rash and wound.   Allergic/Immunologic: Negative for immunocompromised state.   Neurological:  Negative for dizziness, weakness and headaches.   All other systems reviewed and are negative.      Objective:      Physical Exam  Vitals and nursing note reviewed.   Constitutional:       General: She is not in acute distress.     Appearance: Normal appearance. She is not ill-appearing, toxic-appearing or diaphoretic.   HENT:      Head: Normocephalic.      Right Ear: Tympanic membrane, ear canal and external ear normal.      Left Ear: Tympanic membrane, ear canal and external ear normal.      Nose: Nose normal. No congestion or rhinorrhea.      Mouth/Throat:      Mouth: Mucous membranes are moist.      Pharynx: No oropharyngeal exudate or posterior oropharyngeal erythema.   Eyes:      General: No scleral icterus.        Right eye: Discharge present.         Left eye: No discharge.      Extraocular Movements: Extraocular movements intact.      Conjunctiva/sclera:      Right eye: Right conjunctiva is injected. Exudate present.      Pupils: Pupils are equal, round, and reactive to light.        Comments: Erythema noted to the corner of right eyelid "   Cardiovascular:      Rate and Rhythm: Normal rate and regular rhythm.      Pulses: Normal pulses.      Heart sounds: Normal heart sounds. No murmur heard.  Pulmonary:      Effort: Pulmonary effort is normal. No respiratory distress.      Breath sounds: Normal breath sounds. No wheezing, rhonchi or rales.   Musculoskeletal:         General: Normal range of motion.      Cervical back: Neck supple. No tenderness.   Lymphadenopathy:      Cervical: No cervical adenopathy.   Skin:     General: Skin is warm and dry.      Capillary Refill: Capillary refill takes less than 2 seconds.      Findings: No rash.   Neurological:      Mental Status: She is alert and oriented to person, place, and time.   Psychiatric:         Mood and Affect: Mood normal.         Behavior: Behavior normal.         Thought Content: Thought content normal.         Judgment: Judgment normal.          Assessment:       1. Conjunctivitis of right eye, unspecified conjunctivitis type    2. Eye irritation        Plan:   Conjunctivitis of right eye, unspecified conjunctivitis type  -     ciprofloxacin HCl (CILOXAN) 0.3 % ophthalmic solution; Place 1 drop into the right eye every 2 (two) hours.  Dispense: 5 mL; Refill: 0    Eye irritation  -     ciprofloxacin HCl (CILOXAN) 0.3 % ophthalmic solution; Place 1 drop into the right eye every 2 (two) hours.  Dispense: 5 mL; Refill: 0           Risks, benefits, and side effects were discussed with the patient. All questions were answered to the fullest satisfaction of the patient, and pt verbalized understanding and agreement to treatment plan. Pt was to call with any new or worsening symptoms, or present to the ER

## 2025-05-13 NOTE — LETTER
May 13, 2025      [facility]  [address]  [place] [address]  Phone: [phone]  Fax: [phone]       Patient: Tequila Vázquez   YOB: 1975  Date of Visit: 05/13/2025    To Whom It May Concern:    Kishore Vázquez  was at [facility] on 05/13/2025. The patient may return to work/school on 5/14/25 with no restrictions. If you have any questions or concerns, or if I can be of further assistance, please do not hesitate to contact me.    Sincerely,    Erum Gold PA-C

## 2025-05-15 ENCOUNTER — PATIENT OUTREACH (OUTPATIENT)
Facility: HOSPITAL | Age: 50
End: 2025-05-15
Payer: COMMERCIAL

## 2025-06-03 ENCOUNTER — CLINICAL SUPPORT (OUTPATIENT)
Dept: FAMILY MEDICINE | Facility: CLINIC | Age: 50
End: 2025-06-03
Payer: COMMERCIAL

## 2025-06-03 ENCOUNTER — TELEPHONE (OUTPATIENT)
Dept: FAMILY MEDICINE | Facility: CLINIC | Age: 50
End: 2025-06-03
Payer: COMMERCIAL

## 2025-06-03 DIAGNOSIS — Z71.9 COUNSELED BY NURSE: Primary | ICD-10-CM

## 2025-06-04 DIAGNOSIS — J30.2 SEASONAL ALLERGIES: Chronic | ICD-10-CM

## 2025-06-04 DIAGNOSIS — E11.9 TYPE 2 DIABETES MELLITUS WITHOUT COMPLICATION, WITHOUT LONG-TERM CURRENT USE OF INSULIN: Chronic | ICD-10-CM

## 2025-06-04 DIAGNOSIS — I10 HYPERTENSION, UNSPECIFIED TYPE: Chronic | ICD-10-CM

## 2025-06-04 RX ORDER — LOVASTATIN 20 MG/1
20 TABLET ORAL DAILY
Qty: 90 TABLET | Refills: 0 | Status: SHIPPED | OUTPATIENT
Start: 2025-06-04

## 2025-06-04 RX ORDER — FLUTICASONE PROPIONATE 50 MCG
1 SPRAY, SUSPENSION (ML) NASAL DAILY
Qty: 16 G | Refills: 2 | Status: SHIPPED | OUTPATIENT
Start: 2025-06-04

## 2025-06-04 RX ORDER — INSULIN GLARGINE 100 [IU]/ML
32 INJECTION, SOLUTION SUBCUTANEOUS DAILY
Qty: 9.6 ML | Refills: 2 | Status: SHIPPED | OUTPATIENT
Start: 2025-06-04 | End: 2025-09-02

## 2025-06-27 ENCOUNTER — HOSPITAL ENCOUNTER (EMERGENCY)
Facility: HOSPITAL | Age: 50
Discharge: HOME | End: 2025-06-27
Payer: COMMERCIAL

## 2025-06-27 VITALS
WEIGHT: 207 LBS | HEIGHT: 65 IN | HEART RATE: 85 BPM | BODY MASS INDEX: 34.49 KG/M2 | TEMPERATURE: 98 F | DIASTOLIC BLOOD PRESSURE: 70 MMHG | SYSTOLIC BLOOD PRESSURE: 107 MMHG | OXYGEN SATURATION: 97 % | RESPIRATION RATE: 18 BRPM

## 2025-06-27 DIAGNOSIS — M19.90 ARTHRITIS: Primary | ICD-10-CM

## 2025-06-27 DIAGNOSIS — M50.30 DDD (DEGENERATIVE DISC DISEASE), CERVICAL: ICD-10-CM

## 2025-06-27 DIAGNOSIS — R06.02 SHORTNESS OF BREATH: ICD-10-CM

## 2025-06-27 LAB
ALBUMIN SERPL BCP-MCNC: 3 G/DL (ref 3.5–5)
ALBUMIN/GLOB SERPL: 0.7 {RATIO}
ALP SERPL-CCNC: 76 U/L (ref 40–150)
ALT SERPL W P-5'-P-CCNC: 24 U/L
ANION GAP SERPL CALCULATED.3IONS-SCNC: 6 MMOL/L (ref 7–16)
AST SERPL W P-5'-P-CCNC: 26 U/L (ref 11–45)
BASOPHILS # BLD AUTO: 0.01 K/UL (ref 0–0.2)
BASOPHILS NFR BLD AUTO: 0.3 % (ref 0–1)
BILIRUB SERPL-MCNC: 0.2 MG/DL
BUN SERPL-MCNC: 10 MG/DL (ref 10–20)
BUN/CREAT SERPL: 11 (ref 6–20)
CALCIUM SERPL-MCNC: 8 MG/DL (ref 8.4–10.2)
CHLORIDE SERPL-SCNC: 111 MMOL/L (ref 98–107)
CO2 SERPL-SCNC: 24 MMOL/L (ref 22–29)
CREAT SERPL-MCNC: 0.9 MG/DL (ref 0.55–1.02)
DIFFERENTIAL METHOD BLD: ABNORMAL
EGFR (NO RACE VARIABLE) (RUSH/TITUS): 77 ML/MIN/1.73M2
EOSINOPHIL # BLD AUTO: 0.07 K/UL (ref 0–0.5)
EOSINOPHIL NFR BLD AUTO: 1.8 % (ref 1–4)
ERYTHROCYTE [DISTWIDTH] IN BLOOD BY AUTOMATED COUNT: 13.1 % (ref 11.5–14.5)
GLOBULIN SER-MCNC: 4.5 G/DL (ref 2–4)
GLUCOSE SERPL-MCNC: 244 MG/DL (ref 74–100)
GLUCOSE SERPL-MCNC: 256 MG/DL (ref 70–105)
HCT VFR BLD AUTO: 35.6 % (ref 38–47)
HGB BLD-MCNC: 11.5 G/DL (ref 12–16)
IMM GRANULOCYTES # BLD AUTO: 0.01 K/UL (ref 0–0.04)
IMM GRANULOCYTES NFR BLD: 0.3 % (ref 0–0.4)
LYMPHOCYTES # BLD AUTO: 1.41 K/UL (ref 1–4.8)
LYMPHOCYTES NFR BLD AUTO: 37.2 % (ref 27–41)
MAGNESIUM SERPL-MCNC: 2.2 MG/DL (ref 1.6–2.6)
MCH RBC QN AUTO: 30.3 PG (ref 27–31)
MCHC RBC AUTO-ENTMCNC: 32.3 G/DL (ref 32–36)
MCV RBC AUTO: 93.9 FL (ref 80–96)
MONOCYTES # BLD AUTO: 0.46 K/UL (ref 0–0.8)
MONOCYTES NFR BLD AUTO: 12.1 % (ref 2–6)
MPC BLD CALC-MCNC: 10.8 FL (ref 9.4–12.4)
NEUTROPHILS # BLD AUTO: 1.83 K/UL (ref 1.8–7.7)
NEUTROPHILS NFR BLD AUTO: 48.3 % (ref 53–65)
NRBC # BLD AUTO: 0 X10E3/UL
NRBC, AUTO (.00): 0 %
NT-PROBNP SERPL-MCNC: 68 PG/ML (ref 1–125)
PLATELET # BLD AUTO: 220 K/UL (ref 150–400)
POTASSIUM SERPL-SCNC: 3.9 MMOL/L (ref 3.5–5.1)
PROT SERPL-MCNC: 7.5 G/DL (ref 6.4–8.3)
RBC # BLD AUTO: 3.79 M/UL (ref 4.2–5.4)
SODIUM SERPL-SCNC: 137 MMOL/L (ref 136–145)
TSH SERPL DL<=0.005 MIU/L-ACNC: 0.64 UIU/ML (ref 0.35–4.94)
WBC # BLD AUTO: 3.79 K/UL (ref 4.5–11)

## 2025-06-27 PROCEDURE — 85025 COMPLETE CBC W/AUTO DIFF WBC: CPT | Performed by: NURSE PRACTITIONER

## 2025-06-27 PROCEDURE — 83880 ASSAY OF NATRIURETIC PEPTIDE: CPT | Performed by: NURSE PRACTITIONER

## 2025-06-27 PROCEDURE — 84443 ASSAY THYROID STIM HORMONE: CPT | Performed by: NURSE PRACTITIONER

## 2025-06-27 PROCEDURE — 99284 EMERGENCY DEPT VISIT MOD MDM: CPT | Mod: 25

## 2025-06-27 PROCEDURE — 83735 ASSAY OF MAGNESIUM: CPT | Performed by: NURSE PRACTITIONER

## 2025-06-27 PROCEDURE — 36415 COLL VENOUS BLD VENIPUNCTURE: CPT | Performed by: NURSE PRACTITIONER

## 2025-06-27 PROCEDURE — 80053 COMPREHEN METABOLIC PANEL: CPT | Performed by: NURSE PRACTITIONER

## 2025-06-27 PROCEDURE — 82962 GLUCOSE BLOOD TEST: CPT

## 2025-06-27 RX ORDER — PREDNISONE 10 MG/1
10 TABLET ORAL DAILY
Qty: 21 TABLET | Refills: 0 | Status: SHIPPED | OUTPATIENT
Start: 2025-06-27

## 2025-06-27 NOTE — ED PROVIDER NOTES
Encounter Date: 6/27/2025       History     Chief Complaint   Patient presents with    Leg Swelling    Hand Swelling      Pt presents to the ED with c/o hand swelling and leg swelling that she noticed this morning. Pt has a history of lupus and type 2 diabetes.      50 year old female presents to ED with complaint of leg swelling, hand swelling, back pain, shortness of breath, and leg swelling. She states she started noting the swelling on this morning. She also noted decreased ROM to her right hand. She denies known injury. She states she noted pain to her upper back and stiffness. She reports history of lupus and myasthenia gravis and is followed by Dr. Rogers in [place]. She states she had infusion for MG on today. Denies chest pain; reports intermittent shortness of breath. Denies nausea/vomiting, fever, chills, abdominal pain.     The history is provided by the patient.     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 Added automatically from request for surgery [id number]    Systemic lupus erythematosus, organ or system involvement unspecified      Past Surgical History:   Procedure Laterality Date    DILATION AND CURETTAGE OF UTERUS  2000    preformed at [facility]    HYSTERECTOMY      MUSCLE BIOPSY      OOPHORECTOMY      pt has had one ovary removed, unsure side    TUBAL LIGATION  1999     [facility]     Family History   Problem Relation Name Age of Onset    Asthma Mother      Coronary artery disease Mother      Diabetes Father      Heart disease Father      Diabetes Sister      Cancer Sister          Breast    Breast cancer Sister      Diabetes Brother       Social History[1]  Review of Systems    Constitutional:  Negative for chills and fever.   Eyes:  Negative for photophobia and visual disturbance.   Respiratory:  Positive for shortness of breath. Negative for cough.    Cardiovascular:  Negative for chest pain and palpitations.   Gastrointestinal:  Negative for nausea and vomiting.   Musculoskeletal:  Positive for arthralgias and back pain.   Skin:  Negative for color change and wound.   Neurological:  Negative for dizziness and weakness.   Hematological:  Negative for adenopathy. Does not bruise/bleed easily.   Psychiatric/Behavioral:  Negative for agitation and confusion.    All other systems reviewed and are negative.      Physical Exam     Initial Vitals [06/27/25 1616]   BP Pulse Resp Temp SpO2   107/70 85 18 97.7 °F (36.5 °C) 97 %      MAP       --         Physical Exam    Nursing note and vitals reviewed.  Constitutional: She appears well-developed and well-nourished.   HENT:   Head: Normocephalic and atraumatic.   Eyes: EOM are normal. Pupils are equal, round, and reactive to light.   Neck: Neck supple.   Normal range of motion.  Cardiovascular:  Normal rate and regular rhythm.           No murmur heard.  Pulmonary/Chest: She has no wheezes. She has no rhonchi.   Abdominal: Abdomen is soft. She exhibits no distension. There is no abdominal tenderness.   Musculoskeletal:         General: Edema present.      Right hand: Swelling present. Decreased range of motion.      Cervical back: Normal range of motion and neck supple.      Thoracic back: Tenderness present.     Lymphadenopathy:     She has no cervical adenopathy.   Neurological: She is alert and oriented to person, place, and time. No cranial nerve deficit or sensory deficit.   Skin: Skin is warm and dry. Capillary refill takes less than 2 seconds.   Psychiatric: She has a normal mood and affect. Thought content normal.         Medical Screening Exam   See Full Note    ED Course   Procedures  Labs Reviewed   COMPREHENSIVE METABOLIC PANEL -  Abnormal       Result Value    Sodium 137      Potassium 3.9      Chloride 111 (*)     CO2 24      Anion Gap 6 (*)     Glucose 244 (*)     BUN 10      Creatinine 0.90      BUN/Creatinine Ratio 11      Calcium 8.0 (*)     Total Protein 7.5      Albumin 3.0 (*)     Globulin 4.5 (*)     A/G Ratio 0.7      Bilirubin, Total 0.2      Alk Phos 76      ALT 24      AST 26      eGFR 77     CBC WITH DIFFERENTIAL - Abnormal    WBC 3.79 (*)     RBC 3.79 (*)     Hemoglobin 11.5 (*)     Hematocrit 35.6 (*)     MCV 93.9      MCH 30.3      MCHC 32.3      RDW 13.1      Platelet Count 220      MPV 10.8      Neutrophils % 48.3 (*)     Lymphocytes % 37.2      Monocytes % 12.1 (*)     Eosinophils % 1.8      Basophils % 0.3      Immature Granulocytes % 0.3      nRBC, Auto 0.0      Neutrophils, Abs 1.83      Lymphocytes, Absolute 1.41      Monocytes, Absolute 0.46      Eosinophils, Absolute 0.07      Basophils, Absolute 0.01      Immature Granulocytes, Absolute 0.01      nRBC, Absolute 0.00      Diff Type Auto     POCT GLUCOSE MONITORING CONTINUOUS - Abnormal    POC Glucose 256 (*)    MAGNESIUM - Normal    Magnesium 2.2     TSH - Normal    TSH 0.641     NT-PRO NATRIURETIC PEPTIDE - Normal    ProBNP 68     CBC W/ AUTO DIFFERENTIAL    Narrative:     The following orders were created for panel order CBC auto differential.  Procedure                               Abnormality         Status                     ---------                               -----------         ------                     CBC with Differential[[id number]]       Abnormal            Final result                 Please view results for these tests on the individual orders.          Imaging Results              X-Ray Hand 3 view Right (Final result)  Result time 06/27/25 19:53:10      Final result by Guillermo Cedeno MD (06/27/25 19:53:10)                   Impression:      No acute radiographic abnormality.      Electronically signed by: Guillermo  Joann  Date:    06/27/2025  Time:    19:53               Narrative:    EXAMINATION:  XR HAND COMPLETE 3 VIEW RIGHT    CLINICAL HISTORY:  swelling;    TECHNIQUE:  PA, lateral, and oblique views of the right hand were performed.    COMPARISON:  None    FINDINGS:  Alignment is satisfactory.  No acute fracture, subluxation or dislocation.  No mass or foreign body.  No significant arthropathy.                                       X-Ray Chest PA And Lateral (Final result)  Result time 06/27/25 18:11:39      Final result by Juma Gonzales MD (06/27/25 18:11:39)                   Impression:      Mild bibasilar atelectasis.      Electronically signed by: Juma Gonzales  Date:    06/27/2025  Time:    18:11               Narrative:    EXAMINATION:  XR CHEST, 2 VIEWS    CLINICAL HISTORY:  Shortness of breath.    TECHNIQUE:  PA, lateral views.    COMPARISON:  CXRs 01/23/2025, 09/03/2020.    FINDINGS:  Submaximal depth of inspiration.    Heart size is normal.    Pulmonary vessels appear unremarkable.    No mediastinal enlargement.    Mild bibasilar thin linear opacities.    Mild thickening of fissures.    Mild right lateral curvature of lower thoracic spine.                                       X-Ray Thoracic Spine AP Lateral (Final result)  Result time 06/27/25 18:29:06      Final result by Juma Gonzales MD (06/27/25 18:29:06)                   Impression:      1.  No acute osseous abnormality with no fracture, subluxation or osseous destructive process.    2.  Mild right lateral curvature of mid-lower thoracic spine.    3.  Moderate degenerative changes at C6-7 level with moderate disc narrowing and small anterior and posterior endplate osteophytes.    4.  No significant disc narrowing-degenerative changes of thoracic spine.    5.  No abnormal paravertebral soft tissue thickening.      Electronically signed by: Juma Gonzales  Date:    06/27/2025  Time:    18:29               Narrative:    EXAMINATION:  XR THORACIC SPINE, 2  VIEWS    CLINICAL HISTORY:  Shortness of breath, back pain, leg and hand swelling.    TECHNIQUE:  AP, lateral views obtained.    COMPARISON:  CXR 06/27/2025, CT thorax 01/18/2020.                                       Medications - No data to display  Medical Decision Making  50 year old female presents to ED with complaint of leg swelling, hand swelling, back pain, shortness of breath, and leg swelling. She states she started noting the swelling on this morning. She also noted decreased ROM to her right hand. She denies known injury. She states she noted pain to her upper back and stiffness. She reports history of lupus and myasthenia gravis and is followed by Dr. Rogers in [place]. She states she had infusion for MG on today. Denies chest pain; reports intermittent shortness of breath. Denies nausea/vomiting, fever, chills, abdominal pain.     Labs, diagnostics ordered and reviewed  Radiologist interpretation reviewed  Prescriptions provided   Referral placed for rheumatology    Amount and/or Complexity of Data Reviewed  Labs: ordered.  Radiology: ordered.    Risk  Prescription drug management.                                      Clinical Impression:   Final diagnoses:  [R06.02] Shortness of breath  [M19.90] Arthritis (Primary)  [M50.30] DDD (degenerative disc disease), cervical        ED Disposition Condition    Discharge Stable          ED Prescriptions       Medication Sig Dispense Start Date End Date Auth. Provider    predniSONE (DELTASONE) 10 MG tablet Take 1 tablet (10 mg total) by mouth once daily. Take 4 tabs x 3 days, then take 2 tabs x 3 days, then take 1 tab x 3 days. 21 tablet 6/27/2025 -- Sarah Mckeon, KOF[facility]          Follow-up Information       Follow up With Specialties Details Why Contact Info    Angelika Patel NP Internal Medicine   [address] [place] [address] [address], [address], [address]  [facility] [address]  [phone]                   [1]   Social History  Tobacco Use    Smoking status: Never      Passive exposure: Never    Smokeless tobacco: Current     Types: Snuff   Substance Use Topics    Alcohol use: Never    Drug use: Never        Sarah Mckeon, [facility]  06/27/25 8488

## 2025-07-03 ENCOUNTER — TELEPHONE (OUTPATIENT)
Dept: FAMILY MEDICINE | Facility: CLINIC | Age: 50
End: 2025-07-03
Payer: COMMERCIAL

## 2025-07-03 NOTE — TELEPHONE ENCOUNTER
Spoke with patient about health , current open care gaps and upcoming appts . Patient is Diabetic and due for A1C , Urine Microalbumin and Lipid Panel . Had recent trip to [place] and agreed she need to come in for check up for multiple chronic conditions and lab work  , scheduled appt with Elizabethrobles Schmidt 25 @ 9:15am .     CLINIC QUALITY METRIC ASSESSMENT    Further Action Needed If Patient Returns Outreach:        Health Maintenance Due   Topic Date Due    Foot Exam  2023    Hemoglobin A1c  2025    Diabetes Urine Screening  2025    Lipid Panel  2025          Updates Requested / Reviewed:     [x]  Care Everywhere    []     []  External Sources ([facility], [facility], [facility], etc.)    [] [facility]   [] [facility]   [] Other:    []  Care Team Updated   []  Removed  or Duplicate Orders   []  Immunization Reconciliation Completed / Queried    [] [place]   [] [place]   [] [place]   [] [place]      Health Maintenance Topics Addressed and Outreach Outcomes / Actions Taken:             Breast Cancer Screening []  Mammogram Order Placed    [x]  Reminded of Mammogram Screening Scheduled    []  External Records Requested & Care Team Updated if Applicable    []  External Records Uploaded & Care Team Updated if Applicable    []  Pt Declined Scheduling Mammogram    []  Pt Will Schedule with External Provider / Order Routed & Care Team Updated if Applicable              Cervical Cancer Screening []  Pap Smear Scheduled in Primary Care or OBGYN    []  External Records Requested & Care Team Updated if Applicable       []  External Records Uploaded, Care Team Updated, & History Updated if Applicable    []  Patient Declined Scheduling Pap Smear    []  Patient Will Schedule with External Provider & Care Team Updated if Applicable                  Colorectal Cancer Screening []  Colonoscopy Case Request / Referral / Home Test Order Placed    []  External Records Requested & Care Team Updated if Applicable     []  External Records Uploaded, Care Team Updated, & History Updated if Applicable    []  Patient Declined Completing Colon Cancer Screening    []  Patient Will Schedule with External Provider & Care Team Updated if Applicable    []  Fit Kit Mailed (add the SmartPhrase under additional notes)    []  Reminded Patient to Complete Home Test                Diabetic Eye Exam []  Eye Exam Screening Order Placed    []  Eye Camera Scheduled or Optometry/Ophthalmology Referral Placed    []  External Records Requested & Care Team Updated if Applicable    []  External Records Uploaded, Care Team Updated, & History Updated if Applicable    []  Patient Declined Scheduling Eye Exam    []  Patient Will Schedule with External Provider & Care Team Updated if Applicable             Blood Pressure Control []  Primary Care Follow Up Visit Scheduled     []  Remote Blood Pressure Reading Captured    []  Patient Declined Remote Reading or Scheduling Appt - Escalated to PCP    []  Patient Will Call Back or Send Portal Message with Reading                 HbA1c & Other Labs []  Overdue Lab(s) Ordered    []  Overdue Lab(s) Scheduled    []  External Records Uploaded & Care Team Updated if Applicable    [x]  Primary Care Follow Up Visit Scheduled     []  Reminded Patient to Complete A1c Home Test    []  Patient Declined Scheduling Labs or Will Call Back to Schedule    []  Patient Will Schedule with External Provider / Order Routed, & Care Team Updated if Applicable           Primary Care Appointment [x]  Primary Care Appt Scheduled    []  Patient Declined Scheduling or Will Call Back to Schedule    []  Pt Established with External Provider, Updated Care Team, & Informed Pt to Notify Payor if Applicable           Medication Adherence /    Statin Use [x]  Primary Care Appointment Scheduled    []  Patient Reminded to  Prescription    []  Patient Declined, Provider Notified if Needed    []  Sent Provider Message to Review to Evaluate Pt for  Statin, Add Exclusion Dx Codes, Document   Exclusion in Problem List, Change Statin Intensity Level to Moderate or High Intensity if Applicable                Osteoporosis Screening []  Dexa Order Placed    []  Dexa Appointment Scheduled    []  External Records Requested & Care Team Updated    []  External Records Uploaded, Care Team Updated, & History Updated if Applicable    []  Patient Declined Scheduling Dexa or Will Call Back to Schedule    []  Patient Will Schedule with External Provider / Order Routed & Care Team Updated if Applicable       Additional Notes:

## 2025-07-21 PROBLEM — G73.7: Status: RESOLVED | Noted: 2021-08-13 | Resolved: 2025-07-21

## 2025-07-21 PROBLEM — G72.49 AUTOIMMUNE NECROTIZING MYOPATHY: Status: ACTIVE | Noted: 2025-07-21

## 2025-07-21 NOTE — PROGRESS NOTES
"Clinic note     Patient name: Tequila Vázquez is a 50 y.o. female   Chief compliant   Chief Complaint   Patient presents with    Medication Refill     Here for med refills. States she had to send daina back to company they are suppose to send her new ones she is waiting on them.  C/o some swelling in hands and feet.     Health Maintenance     Foot Exam due on 04/13/2023  Hemoglobin A1c due on 07/02/2025:ordered today  Diabetes Urine Screening due on 07/11/2025 ordered today  Lipid Panel due on 07/11/2025 ordered today         Subjective     History of present illness   In clinic for routine follow up and medication refills   She reports multiple falls over the last 4-6 weeks, denies any known injury   She request referral to home health for PT, strengthening lower extremities and balance; fall prevention     Last A1c 11.4, this will be rechecked today   Checking blood glucose: Daina CGM   She states fasting blood glucose was 130-150 when she was using Daina   Daina not currently in use, states she had a box of bad sensors, she has returned to company and is waiting on replacements   She has been trying to follow the plate method for her diet   She admits that she has a "lot of stress" with home and family issues, she has stopped follow up apt with JOHNATHAN De Anda; strongly encouraged to schedule a follow up appointment, she states she will schedule appointment as soon as her grand child starts school in a few weeks     She was seen by Sushant RTUJILLO CDE for diabetic education on 1/29/2025     Seeing Dr Camacho for pain management  Psychiatry: JOHNATHAN De Anda      Hx of right eyelid ptosis and weakness of RUE and RLE, myasthenia gravis, SLE,  recently seen by Mon[facility] SU[facility] neurology,  Dr Desir and ANDREA Rogers, NP-C at  neurology [facility]; last clinic note reviewed   She has been receiving  IVIG infusions weekly  (infusions are done in her home)   She reports they are discussing placing Mediport for infusions               Social " History[1]    Review of patient's allergies indicates:   Allergen Reactions    Aspirin Hives       Past Medical History:   Diagnosis Date    Asthma     Diabetes mellitus     History of colonic polyps 04/16/2024    Late syphilis, 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 Added automatically from request for surgery [id number]    Systemic lupus erythematosus, organ or system involvement unspecified        Past Surgical History:   Procedure Laterality Date    DILATION AND CURETTAGE OF UTERUS  2000    preformed at [facility]    HYSTERECTOMY      MUSCLE BIOPSY      OOPHORECTOMY      pt has had one ovary removed, unsure side    TUBAL LIGATION  1999    [facility]        Family History   Problem Relation Name Age of Onset    Asthma Mother      Coronary artery disease Mother      Diabetes Father      Heart disease Father      Diabetes Sister      Cancer Sister          Breast    Breast cancer Sister      Diabetes Brother         Current Medications[2]    Review of Systems   Constitutional:  Negative for appetite change, chills, fatigue, fever and unexpected weight change.   Respiratory:  Negative for cough and shortness of breath.    Cardiovascular:  Positive for leg swelling (swelling of hands and feet). Negative for chest pain and palpitations.   Gastrointestinal:  Negative for abdominal pain, change in bowel habit, constipation, diarrhea, nausea and vomiting.   Genitourinary:  Negative for dysuria and frequency.   Musculoskeletal:  Negative for arthralgias, gait problem and myalgias.   Neurological:  Positive for coordination difficulties. Negative for dizziness, syncope, light-headedness and headaches.        Frequent falls    Psychiatric/Behavioral:  Negative for dysphoric mood, hallucinations, self-injury, sleep disturbance and suicidal ideas. The patient is nervous/anxious.         Increased family/home stress   "      Objective     /76 (BP Location: Left arm, Patient Position: Sitting)   Pulse 68   Ht 5' 5" (1.651 m)   Wt 93.7 kg (206 lb 9.6 oz)   SpO2 99%   BMI 34.38 kg/m²     Physical Exam   Constitutional: She is oriented to person, place, and time. She appears obese. No distress.   HENT:   Head: Atraumatic.   Mouth/Throat: Mucous membranes are moist.   Eyes:   Right eye ptosis    Cardiovascular: Normal rate and regular rhythm. Pulmonary:      Effort: Pulmonary effort is normal. No respiratory distress.      Breath sounds: Normal breath sounds. No wheezing, rhonchi or rales.     Abdominal: Soft. Bowel sounds are normal. She exhibits no distension. There is no abdominal tenderness.   Musculoskeletal:         General: Normal range of motion.      Cervical back: Neck supple.      Right lower leg: No edema.      Left lower leg: No edema.   Neurological: She is alert and oriented to person, place, and time. Gait normal.   Skin: Skin is warm and dry.   Psychiatric: Her behavior is normal. Mood normal.       Lab Results   Component Value Date    WBC 3.79 (L) 06/27/2025    HGB 11.5 (L) 06/27/2025    HCT 35.6 (L) 06/27/2025    MCV 93.9 06/27/2025     06/27/2025       CMP  Sodium   Date Value Ref Range Status   06/27/2025 137 136 - 145 mmol/L Final     Potassium   Date Value Ref Range Status   06/27/2025 3.9 3.5 - 5.1 mmol/L Final     Chloride   Date Value Ref Range Status   06/27/2025 111 (H) 98 - 107 mmol/L Final     CO2   Date Value Ref Range Status   06/27/2025 24 22 - 29 mmol/L Final     Glucose   Date Value Ref Range Status   06/27/2025 244 (H) 74 - 100 mg/dL Final     BUN   Date Value Ref Range Status   06/27/2025 10 10 - 20 mg/dL Final     Creatinine   Date Value Ref Range Status   06/27/2025 0.90 0.55 - 1.02 mg/dL Final     Calcium   Date Value Ref Range Status   06/27/2025 8.0 (L) 8.4 - 10.2 mg/dL Final     Total Protein   Date Value Ref Range Status   06/27/2025 7.5 6.4 - 8.3 g/dL Final     Albumin " "  Date Value Ref Range Status   06/27/2025 3.0 (L) 3.5 - 5.0 g/dL Final     Bilirubin, Total   Date Value Ref Range Status   06/27/2025 0.2 <=1.5 mg/dL Final     Alk Phos   Date Value Ref Range Status   06/27/2025 76 40 - 150 U/L Final     AST   Date Value Ref Range Status   06/27/2025 26 11 - 45 U/L Final     ALT   Date Value Ref Range Status   06/27/2025 24 <=55 U/L Final     Anion Gap   Date Value Ref Range Status   06/27/2025 6 (L) 7 - 16 mmol/L Final     eGFR    Date Value Ref Range Status   08/13/2021 67 >=60 mL/min/1.73m² Final     eGFR   Date Value Ref Range Status   04/13/2022 61 >=60 mL/min/1.73m² Final     Lab Results   Component Value Date    TSH 0.641 06/27/2025     Lab Results   Component Value Date    CHOL 216 (H) 07/11/2024    CHOL 232 (H) 06/12/2023    CHOL 216 (H) 04/13/2022     Lab Results   Component Value Date    HDL 51 07/11/2024    HDL 67 (H) 06/12/2023    HDL 58 04/13/2022     Lab Results   Component Value Date    LDLCALC 133 07/11/2024    LDLCALC 143 06/12/2023    LDLCALC 137 04/13/2022     Lab Results   Component Value Date    TRIG 162 (H) 07/11/2024    TRIG 112 06/12/2023    TRIG 106 04/13/2022     Lab Results   Component Value Date    CHOLHDL 4.2 07/11/2024    CHOLHDL 3.5 06/12/2023    CHOLHDL 3.7 04/13/2022     Lab Results   Component Value Date    HGBA1C 11.4 (H) 04/02/2025       No results found for: "[id number]", "FLUAMOLEC", "FLUBMOLEC", "MOLSTREPAPOC"  Any diagnostic testing results obtained in office or prior to appointment were reviewed were reviewed with patient.    Health Maintenance Due   Topic Date Due    Foot Exam  04/13/2023    Hemoglobin A1c  07/02/2025    Diabetes Urine Screening  07/11/2025    Lipid Panel  07/11/2025         Health Maintenance Topics with due status: Not Due       Topic Last Completion Date    Colorectal Cancer Screening 04/16/2024    Mammogram 10/23/2024    Influenza Vaccine 10/28/2024    Diabetic Eye Exam 04/22/2025    RSV Vaccine (Age " 60+ and Pregnant patients) Not Due         Assessment and Plan   Type 2 diabetes mellitus without complication, without long-term current use of insulin  -     insulin glargine U-100, Lantus, (BASAGLAR KWIKPEN U-100 INSULIN) 100 unit/mL (3 mL) InPn pen; Inject 32 Units into the skin once daily.  Dispense: 9.6 mL; Refill: 2  -     Hemoglobin A1C; Future; Expected date: 07/22/2025  -     Microalbumin/Creatinine Ratio, Urine; Future; Expected date: 07/22/2025  -     Ambulatory referral/consult to Home Health; Future; Expected date: 07/23/2025    Hypertension, unspecified type  -     lovastatin (MEVACOR) 20 MG tablet; Take 1 tablet (20 mg total) by mouth once daily.  Dispense: 90 tablet; Refill: 3  -     Comprehensive Metabolic Panel; Future; Expected date: 07/22/2025    Seasonal allergies  -     fluticasone propionate (FLONASE) 50 mcg/actuation nasal spray; 1 spray (50 mcg total) by Each Nostril route once daily.  Dispense: 16 g; Refill: 2    Autoimmune necrotizing myopathy  -     Ambulatory referral/consult to Home Health; Future; Expected date: 07/23/2025    Hyperlipidemia, unspecified hyperlipidemia type  -     Lipid Panel; Future; Expected date: 07/22/2025    Class 1 obesity due to excess calories with serious comorbidity and body mass index (BMI) of 34.0 to 34.9 in adult    Frequent falls  -     Ambulatory referral/consult to Home Health; Future; Expected date: 07/23/2025          Patient Instructions  Patient Instructions   Lab obtained in clinic today, we will notify you of results and any necessary changes to plan of care   Refills on routine medications   Follow up on 10/13/2025 and as needed; routine medication will be due 10/20/2025    Blood glucose   Fasting goal   Post prandial (two hours after eating)  goal <180  A1c goal <7  Recommend 45-60 grams of carbohydrates per meal   Follow plate method, avoiding sugar sweetened drinks and fruit juice; avoid foods high in concentrated sugars   Do not skip  "meals  Exercise 30 minutes daily for at least 5 days per week     Referral to [facility]                                    [1]   Social History  Tobacco Use    Smoking status: Never     Passive exposure: Never    Smokeless tobacco: Current     Types: Snuff   Substance Use Topics    Alcohol use: Never    Drug use: Never   [2]   Current Outpatient Medications:     albuterol (PROVENTIL/VENTOLIN HFA) 90 mcg/actuation inhaler, Inhale 2 puffs into the lungs every 6 (six) hours as needed for Wheezing. Rescue, Disp: 18 g, Rfl: 2    folic acid (FOLVITE) 1 MG tablet, Take 1 tablet by mouth once daily., Disp: , Rfl:     lancets (E-Z JECT LANCETS) 32 gauge Misc, 100 lancets by Misc.(Non-Drug; Combo Route) route. USE TO CHECK GLUCOSE ONCE DAILY, Disp: , Rfl:     methotrexate 5 MG tablet, Take 12.5mg on Saturday and 12.5mg on Sunday max 25mg weekly, Disp: , Rfl:     PANZYGA 10 % Soln, Inject into the vein once a week., Disp: , Rfl:     pen needle, diabetic 32 gauge x 1/4" Ndle, Use one needle to inject insulin daily as prescribed, Disp: 100 each, Rfl: 5    predniSONE (DELTASONE) 10 MG tablet, Take 1 tablet (10 mg total) by mouth once daily. Take 4 tabs x 3 days, then take 2 tabs x 3 days, then take 1 tab x 3 days., Disp: 21 tablet, Rfl: 0    pregabalin (LYRICA) 75 MG capsule, Take 75 mg by mouth 3 (three) times daily., Disp: , Rfl:     EASY TOUCH 31 gauge x 3/16" Ndle, USE one needle TO INJECT insulin ONCE DAILY as prescribed, Disp: , Rfl:     fluticasone propionate (FLONASE) 50 mcg/actuation nasal spray, 1 spray (50 mcg total) by Each Nostril route once daily., Disp: 16 g, Rfl: 2    insulin glargine U-100, Lantus, (BASAGLAR KWIKPEN U-100 INSULIN) 100 unit/mL (3 mL) InPn pen, Inject 32 Units into the skin once daily., Disp: 9.6 mL, Rfl: 2    lovastatin (MEVACOR) 20 MG tablet, Take 1 tablet (20 mg total) by mouth once daily., Disp: 90 tablet, Rfl: 3    "

## 2025-07-22 ENCOUNTER — OFFICE VISIT (OUTPATIENT)
Dept: FAMILY MEDICINE | Facility: CLINIC | Age: 50
End: 2025-07-22
Payer: COMMERCIAL

## 2025-07-22 VITALS
HEART RATE: 68 BPM | OXYGEN SATURATION: 99 % | BODY MASS INDEX: 34.43 KG/M2 | WEIGHT: 206.63 LBS | HEIGHT: 65 IN | DIASTOLIC BLOOD PRESSURE: 76 MMHG | SYSTOLIC BLOOD PRESSURE: 111 MMHG

## 2025-07-22 DIAGNOSIS — G72.49 AUTOIMMUNE NECROTIZING MYOPATHY: ICD-10-CM

## 2025-07-22 DIAGNOSIS — R29.6 FREQUENT FALLS: ICD-10-CM

## 2025-07-22 DIAGNOSIS — J30.2 SEASONAL ALLERGIES: Chronic | ICD-10-CM

## 2025-07-22 DIAGNOSIS — E11.9 TYPE 2 DIABETES MELLITUS WITHOUT COMPLICATION, WITHOUT LONG-TERM CURRENT USE OF INSULIN: Primary | Chronic | ICD-10-CM

## 2025-07-22 DIAGNOSIS — E66.09 CLASS 1 OBESITY DUE TO EXCESS CALORIES WITH SERIOUS COMORBIDITY AND BODY MASS INDEX (BMI) OF 34.0 TO 34.9 IN ADULT: Chronic | ICD-10-CM

## 2025-07-22 DIAGNOSIS — I10 HYPERTENSION, UNSPECIFIED TYPE: Chronic | ICD-10-CM

## 2025-07-22 DIAGNOSIS — E66.811 CLASS 1 OBESITY DUE TO EXCESS CALORIES WITH SERIOUS COMORBIDITY AND BODY MASS INDEX (BMI) OF 34.0 TO 34.9 IN ADULT: Chronic | ICD-10-CM

## 2025-07-22 DIAGNOSIS — E78.5 HYPERLIPIDEMIA, UNSPECIFIED HYPERLIPIDEMIA TYPE: Chronic | ICD-10-CM

## 2025-07-22 PROBLEM — E66.3 OVERWEIGHT (BMI 25.0-29.9): Status: RESOLVED | Noted: 2024-10-21 | Resolved: 2025-07-22

## 2025-07-22 LAB
ALBUMIN SERPL BCP-MCNC: 3.4 G/DL (ref 3.5–5)
ALBUMIN/GLOB SERPL: 0.9 {RATIO}
ALP SERPL-CCNC: 96 U/L (ref 40–150)
ALT SERPL W P-5'-P-CCNC: 15 U/L
ANION GAP SERPL CALCULATED.3IONS-SCNC: 10 MMOL/L (ref 7–16)
AST SERPL W P-5'-P-CCNC: 14 U/L (ref 11–45)
BILIRUB SERPL-MCNC: 0.5 MG/DL
BUN SERPL-MCNC: 12 MG/DL (ref 10–20)
BUN/CREAT SERPL: 13 (ref 6–20)
CALCIUM SERPL-MCNC: 8.7 MG/DL (ref 8.4–10.2)
CHLORIDE SERPL-SCNC: 105 MMOL/L (ref 98–107)
CHOLEST SERPL-MCNC: 254 MG/DL
CHOLEST/HDLC SERPL: 5 {RATIO}
CO2 SERPL-SCNC: 25 MMOL/L (ref 22–29)
CREAT SERPL-MCNC: 0.95 MG/DL (ref 0.55–1.02)
CREAT UR-MCNC: 108 MG/DL (ref 15–325)
EGFR (NO RACE VARIABLE) (RUSH/TITUS): 72 ML/MIN/1.73M2
EST. AVERAGE GLUCOSE BLD GHB EST-MCNC: 249 MG/DL
GLOBULIN SER-MCNC: 4 G/DL (ref 2–4)
GLUCOSE SERPL-MCNC: 277 MG/DL (ref 74–100)
HBA1C MFR BLD HPLC: 10.3 %
HDLC SERPL-MCNC: 51 MG/DL (ref 35–60)
LDLC SERPL CALC-MCNC: 175 MG/DL
LDLC/HDLC SERPL: 3.4 {RATIO}
MICROALBUMIN UR-MCNC: 0.5 MG/DL
MICROALBUMIN/CREAT RATIO PNL UR: 4.6 MG/G (ref 0–30)
NONHDLC SERPL-MCNC: 203 MG/DL
POTASSIUM SERPL-SCNC: 4.2 MMOL/L (ref 3.5–5.1)
PROT SERPL-MCNC: 7.4 G/DL (ref 6.4–8.3)
SODIUM SERPL-SCNC: 136 MMOL/L (ref 136–145)
TRIGL SERPL-MCNC: 140 MG/DL (ref 37–140)
VLDLC SERPL-MCNC: 28 MG/DL

## 2025-07-22 PROCEDURE — 80053 COMPREHEN METABOLIC PANEL: CPT | Mod: ,,, | Performed by: CLINICAL MEDICAL LABORATORY

## 2025-07-22 PROCEDURE — 99214 OFFICE O/P EST MOD 30 MIN: CPT | Mod: ,,, | Performed by: NURSE PRACTITIONER

## 2025-07-22 PROCEDURE — 83036 HEMOGLOBIN GLYCOSYLATED A1C: CPT | Mod: ,,, | Performed by: CLINICAL MEDICAL LABORATORY

## 2025-07-22 PROCEDURE — 3046F HEMOGLOBIN A1C LEVEL >9.0%: CPT | Mod: ,,, | Performed by: NURSE PRACTITIONER

## 2025-07-22 PROCEDURE — 2023F DILAT RTA XM W/O RTNOPTHY: CPT | Mod: ,,, | Performed by: NURSE PRACTITIONER

## 2025-07-22 PROCEDURE — 82570 ASSAY OF URINE CREATININE: CPT | Mod: ,,, | Performed by: CLINICAL MEDICAL LABORATORY

## 2025-07-22 PROCEDURE — 80061 LIPID PANEL: CPT | Mod: ,,, | Performed by: CLINICAL MEDICAL LABORATORY

## 2025-07-22 PROCEDURE — 82043 UR ALBUMIN QUANTITATIVE: CPT | Mod: ,,, | Performed by: CLINICAL MEDICAL LABORATORY

## 2025-07-22 PROCEDURE — 1159F MED LIST DOCD IN RCRD: CPT | Mod: ,,, | Performed by: NURSE PRACTITIONER

## 2025-07-22 PROCEDURE — 1160F RVW MEDS BY RX/DR IN RCRD: CPT | Mod: ,,, | Performed by: NURSE PRACTITIONER

## 2025-07-22 PROCEDURE — 3008F BODY MASS INDEX DOCD: CPT | Mod: ,,, | Performed by: NURSE PRACTITIONER

## 2025-07-22 PROCEDURE — 3074F SYST BP LT 130 MM HG: CPT | Mod: ,,, | Performed by: NURSE PRACTITIONER

## 2025-07-22 PROCEDURE — 3078F DIAST BP <80 MM HG: CPT | Mod: ,,, | Performed by: NURSE PRACTITIONER

## 2025-07-22 RX ORDER — LOVASTATIN 20 MG/1
20 TABLET ORAL DAILY
Qty: 90 TABLET | Refills: 3 | Status: SHIPPED | OUTPATIENT
Start: 2025-07-22

## 2025-07-22 RX ORDER — INSULIN GLARGINE 100 [IU]/ML
32 INJECTION, SOLUTION SUBCUTANEOUS DAILY
Qty: 9.6 ML | Refills: 2 | Status: SHIPPED | OUTPATIENT
Start: 2025-07-22 | End: 2025-10-20

## 2025-07-22 RX ORDER — PREGABALIN 75 MG/1
75 CAPSULE ORAL 3 TIMES DAILY
COMMUNITY
Start: 2025-07-17 | End: 2026-07-17

## 2025-07-22 RX ORDER — FLUTICASONE PROPIONATE 50 MCG
1 SPRAY, SUSPENSION (ML) NASAL DAILY
Qty: 16 G | Refills: 2 | Status: SHIPPED | OUTPATIENT
Start: 2025-07-22

## 2025-07-22 NOTE — PATIENT INSTRUCTIONS
Lab obtained in clinic today, we will notify you of results and any necessary changes to plan of care   Refills on routine medications   Follow up on 10/13/2025 and as needed; routine medication will be due 10/20/2025    Blood glucose   Fasting goal   Post prandial (two hours after eating)  goal <180  A1c goal <7  Recommend 45-60 grams of carbohydrates per meal   Follow plate method, avoiding sugar sweetened drinks and fruit juice; avoid foods high in concentrated sugars   Do not skip meals  Exercise 30 minutes daily for at least 5 days per week     Referral to [facility]

## 2025-08-16 ENCOUNTER — HOSPITAL ENCOUNTER (EMERGENCY)
Facility: HOSPITAL | Age: 50
Discharge: HOME | End: 2025-08-16
Attending: FAMILY MEDICINE
Payer: COMMERCIAL

## 2025-08-16 VITALS
BODY MASS INDEX: 35.74 KG/M2 | DIASTOLIC BLOOD PRESSURE: 51 MMHG | HEIGHT: 65 IN | SYSTOLIC BLOOD PRESSURE: 89 MMHG | WEIGHT: 214.5 LBS | TEMPERATURE: 98 F | OXYGEN SATURATION: 98 % | HEART RATE: 64 BPM | RESPIRATION RATE: 13 BRPM

## 2025-08-16 DIAGNOSIS — R07.9 CHEST PAIN, UNSPECIFIED TYPE: Primary | ICD-10-CM

## 2025-08-16 DIAGNOSIS — R07.9 CHEST PAIN: ICD-10-CM

## 2025-08-16 DIAGNOSIS — M32.9 SYSTEMIC LUPUS ERYTHEMATOSUS, UNSPECIFIED SLE TYPE, UNSPECIFIED ORGAN INVOLVEMENT STATUS: ICD-10-CM

## 2025-08-16 LAB
ALBUMIN SERPL BCP-MCNC: 3.4 G/DL (ref 3.5–5)
ALBUMIN/GLOB SERPL: 0.6 {RATIO}
ALP SERPL-CCNC: 102 U/L (ref 40–150)
ALT SERPL W P-5'-P-CCNC: 15 U/L
ANION GAP SERPL CALCULATED.3IONS-SCNC: 15 MMOL/L (ref 7–16)
AST SERPL W P-5'-P-CCNC: 17 U/L (ref 11–45)
BASOPHILS # BLD AUTO: 0.02 K/UL (ref 0–0.2)
BASOPHILS NFR BLD AUTO: 0.6 % (ref 0–1)
BILIRUB SERPL-MCNC: 0.4 MG/DL
BUN SERPL-MCNC: 15 MG/DL (ref 10–20)
BUN/CREAT SERPL: 15 (ref 6–20)
CALCIUM SERPL-MCNC: 9.3 MG/DL (ref 8.4–10.2)
CHLORIDE SERPL-SCNC: 100 MMOL/L (ref 98–107)
CO2 SERPL-SCNC: 24 MMOL/L (ref 22–29)
CREAT SERPL-MCNC: 0.97 MG/DL (ref 0.55–1.02)
DIFFERENTIAL METHOD BLD: ABNORMAL
EGFR (NO RACE VARIABLE) (RUSH/TITUS): 70 ML/MIN/1.73M2
EOSINOPHIL # BLD AUTO: 0.05 K/UL (ref 0–0.5)
EOSINOPHIL NFR BLD AUTO: 1.4 % (ref 1–4)
ERYTHROCYTE [DISTWIDTH] IN BLOOD BY AUTOMATED COUNT: 12.8 % (ref 11.5–14.5)
GLOBULIN SER-MCNC: 5.5 G/DL (ref 2–4)
GLUCOSE SERPL-MCNC: 301 MG/DL (ref 74–100)
HCT VFR BLD AUTO: 41 % (ref 38–47)
HGB BLD-MCNC: 14.5 G/DL (ref 12–16)
IMM GRANULOCYTES # BLD AUTO: 0.01 K/UL (ref 0–0.04)
IMM GRANULOCYTES NFR BLD: 0.3 % (ref 0–0.4)
LYMPHOCYTES # BLD AUTO: 1.49 K/UL (ref 1–4.8)
LYMPHOCYTES NFR BLD AUTO: 42.6 % (ref 27–41)
MCH RBC QN AUTO: 31.5 PG (ref 27–31)
MCHC RBC AUTO-ENTMCNC: 35.4 G/DL (ref 32–36)
MCV RBC AUTO: 89.1 FL (ref 80–96)
MONOCYTES # BLD AUTO: 0.35 K/UL (ref 0–0.8)
MONOCYTES NFR BLD AUTO: 10 % (ref 2–6)
MPC BLD CALC-MCNC: 10.6 FL (ref 9.4–12.4)
NEUTROPHILS # BLD AUTO: 1.58 K/UL (ref 1.8–7.7)
NEUTROPHILS NFR BLD AUTO: 45.1 % (ref 53–65)
NRBC # BLD AUTO: 0 X10E3/UL
NRBC, AUTO (.00): 0 %
PLATELET # BLD AUTO: 276 K/UL (ref 150–400)
POTASSIUM SERPL-SCNC: 4.8 MMOL/L (ref 3.5–5.1)
PROT SERPL-MCNC: 8.9 G/DL (ref 6.4–8.3)
RBC # BLD AUTO: 4.6 M/UL (ref 4.2–5.4)
SODIUM SERPL-SCNC: 134 MMOL/L (ref 136–145)
WBC # BLD AUTO: 3.5 K/UL (ref 4.5–11)

## 2025-08-16 PROCEDURE — 25000003 PHARM REV CODE 250: Performed by: FAMILY MEDICINE

## 2025-08-16 PROCEDURE — 36415 COLL VENOUS BLD VENIPUNCTURE: CPT | Performed by: FAMILY MEDICINE

## 2025-08-16 PROCEDURE — 63600175 PHARM REV CODE 636 W HCPCS: Performed by: FAMILY MEDICINE

## 2025-08-16 PROCEDURE — 96361 HYDRATE IV INFUSION ADD-ON: CPT

## 2025-08-16 PROCEDURE — 96375 TX/PRO/DX INJ NEW DRUG ADDON: CPT

## 2025-08-16 PROCEDURE — 96372 THER/PROPH/DIAG INJ SC/IM: CPT | Performed by: FAMILY MEDICINE

## 2025-08-16 PROCEDURE — 96374 THER/PROPH/DIAG INJ IV PUSH: CPT

## 2025-08-16 PROCEDURE — 80053 COMPREHEN METABOLIC PANEL: CPT | Performed by: FAMILY MEDICINE

## 2025-08-16 PROCEDURE — 93010 ELECTROCARDIOGRAM REPORT: CPT | Mod: ,,, | Performed by: INTERNAL MEDICINE

## 2025-08-16 PROCEDURE — 93005 ELECTROCARDIOGRAM TRACING: CPT

## 2025-08-16 PROCEDURE — 85025 COMPLETE CBC W/AUTO DIFF WBC: CPT | Performed by: FAMILY MEDICINE

## 2025-08-16 PROCEDURE — 99285 EMERGENCY DEPT VISIT HI MDM: CPT | Mod: 25

## 2025-08-16 RX ORDER — METHYLPREDNISOLONE 4 MG/1
TABLET ORAL
Qty: 21 EACH | Refills: 0 | Status: SHIPPED | OUTPATIENT
Start: 2025-08-16 | End: 2025-08-16

## 2025-08-16 RX ORDER — MORPHINE SULFATE 4 MG/ML
4 INJECTION, SOLUTION INTRAMUSCULAR; INTRAVENOUS
Refills: 0 | Status: COMPLETED | OUTPATIENT
Start: 2025-08-16 | End: 2025-08-16

## 2025-08-16 RX ORDER — ONDANSETRON HYDROCHLORIDE 2 MG/ML
4 INJECTION, SOLUTION INTRAVENOUS
Status: COMPLETED | OUTPATIENT
Start: 2025-08-16 | End: 2025-08-16

## 2025-08-16 RX ORDER — HYDROCODONE BITARTRATE AND ACETAMINOPHEN 5; 325 MG/1; MG/1
1 TABLET ORAL EVERY 6 HOURS PRN
Qty: 8 TABLET | Refills: 0 | Status: SHIPPED | OUTPATIENT
Start: 2025-08-16

## 2025-08-16 RX ORDER — TIZANIDINE 4 MG/1
4 TABLET ORAL EVERY 6 HOURS PRN
Qty: 30 TABLET | Refills: 0 | Status: SHIPPED | OUTPATIENT
Start: 2025-08-16 | End: 2025-08-16

## 2025-08-16 RX ORDER — DEXAMETHASONE SODIUM PHOSPHATE 4 MG/ML
4 INJECTION, SOLUTION INTRA-ARTICULAR; INTRALESIONAL; INTRAMUSCULAR; INTRAVENOUS; SOFT TISSUE
Status: COMPLETED | OUTPATIENT
Start: 2025-08-16 | End: 2025-08-16

## 2025-08-16 RX ADMIN — MORPHINE SULFATE 4 MG: 4 INJECTION INTRAVENOUS at 01:08

## 2025-08-16 RX ADMIN — SODIUM CHLORIDE 1000 ML: 9 INJECTION, SOLUTION INTRAVENOUS at 01:08

## 2025-08-16 RX ADMIN — DEXAMETHASONE SODIUM PHOSPHATE 4 MG: 4 INJECTION INTRA-ARTICULAR; INTRALESIONAL; INTRAMUSCULAR; INTRAVENOUS; SOFT TISSUE at 03:08

## 2025-08-16 RX ADMIN — ONDANSETRON 4 MG: 2 INJECTION INTRAMUSCULAR; INTRAVENOUS at 01:08

## 2025-08-17 LAB
OHS QRS DURATION: 76 MS
OHS QTC CALCULATION: 403 MS

## 2025-08-20 ENCOUNTER — TELEPHONE (OUTPATIENT)
Dept: FAMILY MEDICINE | Facility: CLINIC | Age: 50
End: 2025-08-20
Payer: COMMERCIAL

## 2025-08-21 ENCOUNTER — OFFICE VISIT (OUTPATIENT)
Dept: FAMILY MEDICINE | Facility: CLINIC | Age: 50
End: 2025-08-21
Payer: COMMERCIAL

## 2025-08-21 VITALS
SYSTOLIC BLOOD PRESSURE: 94 MMHG | WEIGHT: 203.81 LBS | HEART RATE: 78 BPM | HEIGHT: 65 IN | DIASTOLIC BLOOD PRESSURE: 70 MMHG | OXYGEN SATURATION: 100 % | BODY MASS INDEX: 33.96 KG/M2

## 2025-08-21 DIAGNOSIS — M32.9 SYSTEMIC LUPUS ERYTHEMATOSUS, UNSPECIFIED SLE TYPE, UNSPECIFIED ORGAN INVOLVEMENT STATUS: Chronic | ICD-10-CM

## 2025-08-21 DIAGNOSIS — E66.811 CLASS 1 OBESITY DUE TO EXCESS CALORIES WITH SERIOUS COMORBIDITY AND BODY MASS INDEX (BMI) OF 33.0 TO 33.9 IN ADULT: ICD-10-CM

## 2025-08-21 DIAGNOSIS — R07.89 INTERMITTENT RIGHT-SIDED CHEST PAIN: Primary | ICD-10-CM

## 2025-08-21 DIAGNOSIS — G72.49 AUTOIMMUNE NECROTIZING MYOPATHY: Chronic | ICD-10-CM

## 2025-08-21 DIAGNOSIS — E66.09 CLASS 1 OBESITY DUE TO EXCESS CALORIES WITH SERIOUS COMORBIDITY AND BODY MASS INDEX (BMI) OF 33.0 TO 33.9 IN ADULT: ICD-10-CM

## 2025-08-21 DIAGNOSIS — E11.9 TYPE 2 DIABETES MELLITUS WITHOUT COMPLICATION, WITHOUT LONG-TERM CURRENT USE OF INSULIN: Chronic | ICD-10-CM

## 2025-08-21 PROCEDURE — 3046F HEMOGLOBIN A1C LEVEL >9.0%: CPT | Mod: ,,, | Performed by: NURSE PRACTITIONER

## 2025-08-21 PROCEDURE — 3061F NEG MICROALBUMINURIA REV: CPT | Mod: ,,, | Performed by: NURSE PRACTITIONER

## 2025-08-21 PROCEDURE — 1159F MED LIST DOCD IN RCRD: CPT | Mod: ,,, | Performed by: NURSE PRACTITIONER

## 2025-08-21 PROCEDURE — 3066F NEPHROPATHY DOC TX: CPT | Mod: ,,, | Performed by: NURSE PRACTITIONER

## 2025-08-21 PROCEDURE — 1160F RVW MEDS BY RX/DR IN RCRD: CPT | Mod: ,,, | Performed by: NURSE PRACTITIONER

## 2025-08-21 PROCEDURE — 3074F SYST BP LT 130 MM HG: CPT | Mod: ,,, | Performed by: NURSE PRACTITIONER

## 2025-08-21 PROCEDURE — 3008F BODY MASS INDEX DOCD: CPT | Mod: ,,, | Performed by: NURSE PRACTITIONER

## 2025-08-21 PROCEDURE — 99212 OFFICE O/P EST SF 10 MIN: CPT | Mod: ,,, | Performed by: NURSE PRACTITIONER

## 2025-08-21 PROCEDURE — 3078F DIAST BP <80 MM HG: CPT | Mod: ,,, | Performed by: NURSE PRACTITIONER

## 2025-08-21 PROCEDURE — 2023F DILAT RTA XM W/O RTNOPTHY: CPT | Mod: ,,, | Performed by: NURSE PRACTITIONER

## 2025-08-21 RX ORDER — METHOTREXATE 2.5 MG/1
2.5 TABLET ORAL
COMMUNITY
Start: 2025-08-12

## 2025-08-25 ENCOUNTER — HOSPITAL ENCOUNTER (EMERGENCY)
Facility: HOSPITAL | Age: 50
Discharge: HOME | End: 2025-08-25
Attending: EMERGENCY MEDICINE
Payer: COMMERCIAL

## 2025-08-25 ENCOUNTER — PATIENT MESSAGE (OUTPATIENT)
Facility: HOSPITAL | Age: 50
End: 2025-08-25
Payer: COMMERCIAL

## 2025-08-25 VITALS
RESPIRATION RATE: 16 BRPM | BODY MASS INDEX: 33.15 KG/M2 | TEMPERATURE: 99 F | HEART RATE: 88 BPM | SYSTOLIC BLOOD PRESSURE: 128 MMHG | WEIGHT: 199 LBS | DIASTOLIC BLOOD PRESSURE: 82 MMHG | HEIGHT: 65 IN | OXYGEN SATURATION: 98 %

## 2025-08-25 DIAGNOSIS — G70.00 MYASTHENIA GRAVIS: ICD-10-CM

## 2025-08-25 DIAGNOSIS — R53.1 GENERALIZED WEAKNESS: Primary | ICD-10-CM

## 2025-08-25 PROCEDURE — 99284 EMERGENCY DEPT VISIT MOD MDM: CPT | Mod: 25

## 2025-08-27 ENCOUNTER — PATIENT OUTREACH (OUTPATIENT)
Facility: OTHER | Age: 50
End: 2025-08-27
Payer: COMMERCIAL

## (undated) DEVICE — CATH IV INTROCAN 22G X 1

## (undated) DEVICE — KIT IV START